# Patient Record
Sex: MALE | Race: WHITE | NOT HISPANIC OR LATINO | Employment: FULL TIME | URBAN - METROPOLITAN AREA
[De-identification: names, ages, dates, MRNs, and addresses within clinical notes are randomized per-mention and may not be internally consistent; named-entity substitution may affect disease eponyms.]

---

## 2018-01-23 ENCOUNTER — TRANSCRIBE ORDERS (OUTPATIENT)
Dept: ADMINISTRATIVE | Age: 48
End: 2018-01-23

## 2018-01-23 ENCOUNTER — APPOINTMENT (OUTPATIENT)
Dept: LAB | Age: 48
End: 2018-01-23
Attending: PREVENTIVE MEDICINE

## 2018-01-23 DIAGNOSIS — Z02.1 PRE-EMPLOYMENT HEALTH SCREENING EXAMINATION: Primary | ICD-10-CM

## 2018-01-23 DIAGNOSIS — Z02.1 PRE-EMPLOYMENT HEALTH SCREENING EXAMINATION: ICD-10-CM

## 2018-01-23 LAB — RUBV IGG SERPL IA-ACNC: >175 IU/ML

## 2018-01-23 PROCEDURE — 86480 TB TEST CELL IMMUN MEASURE: CPT

## 2018-01-23 PROCEDURE — 86787 VARICELLA-ZOSTER ANTIBODY: CPT

## 2018-01-23 PROCEDURE — 86765 RUBEOLA ANTIBODY: CPT

## 2018-01-23 PROCEDURE — 86762 RUBELLA ANTIBODY: CPT

## 2018-01-23 PROCEDURE — 86735 MUMPS ANTIBODY: CPT

## 2018-01-23 PROCEDURE — 36415 COLL VENOUS BLD VENIPUNCTURE: CPT

## 2018-01-25 LAB
ANNOTATION COMMENT IMP: NORMAL
GAMMA INTERFERON BACKGROUND BLD IA-ACNC: 0.01 IU/ML
M TB IFN-G BLD-IMP: NEGATIVE
M TB IFN-G CD4+ BCKGRND COR BLD-ACNC: 0 IU/ML
M TB IFN-G CD4+ T-CELLS BLD-ACNC: 0.01 IU/ML
MEV IGG SER QL: ABNORMAL
MITOGEN IGNF BLD-ACNC: 6.81 IU/ML
MUV IGG SER QL: NORMAL
QUANTIFERON-TB GOLD IN TUBE: NORMAL
SERVICE CMNT-IMP: NORMAL
VZV IGG SER IA-ACNC: NORMAL

## 2018-04-30 RX ORDER — DILTIAZEM HYDROCHLORIDE 180 MG/1
CAPSULE, COATED, EXTENDED RELEASE ORAL
COMMUNITY
Start: 2010-01-01 | End: 2018-12-12 | Stop reason: SDUPTHER

## 2018-04-30 RX ORDER — LISINOPRIL 10 MG/1
TABLET ORAL
COMMUNITY
Start: 2010-01-01 | End: 2018-12-12 | Stop reason: SDUPTHER

## 2018-05-01 ENCOUNTER — OFFICE VISIT (OUTPATIENT)
Dept: INTERNAL MEDICINE CLINIC | Facility: CLINIC | Age: 48
End: 2018-05-01
Payer: COMMERCIAL

## 2018-05-01 VITALS
HEIGHT: 69 IN | HEART RATE: 76 BPM | WEIGHT: 120 LBS | DIASTOLIC BLOOD PRESSURE: 70 MMHG | SYSTOLIC BLOOD PRESSURE: 112 MMHG | TEMPERATURE: 97.8 F | OXYGEN SATURATION: 97 % | BODY MASS INDEX: 17.77 KG/M2

## 2018-05-01 VITALS
WEIGHT: 264.8 LBS | HEIGHT: 69 IN | BODY MASS INDEX: 39.22 KG/M2 | DIASTOLIC BLOOD PRESSURE: 70 MMHG | HEART RATE: 76 BPM | OXYGEN SATURATION: 97 % | TEMPERATURE: 97.8 F | SYSTOLIC BLOOD PRESSURE: 112 MMHG

## 2018-05-01 DIAGNOSIS — G47.33 OBSTRUCTIVE SLEEP APNEA: ICD-10-CM

## 2018-05-01 DIAGNOSIS — J30.1 SEASONAL ALLERGIC RHINITIS DUE TO POLLEN: Primary | ICD-10-CM

## 2018-05-01 DIAGNOSIS — I10 ESSENTIAL HYPERTENSION: ICD-10-CM

## 2018-05-01 DIAGNOSIS — Z00.00 ANNUAL PHYSICAL EXAM: Primary | ICD-10-CM

## 2018-05-01 DIAGNOSIS — I47.1 PAROXYSMAL SVT (SUPRAVENTRICULAR TACHYCARDIA) (HCC): ICD-10-CM

## 2018-05-01 DIAGNOSIS — E66.9 NON MORBID OBESITY, UNSPECIFIED OBESITY TYPE: ICD-10-CM

## 2018-05-01 PROCEDURE — 3074F SYST BP LT 130 MM HG: CPT | Performed by: INTERNAL MEDICINE

## 2018-05-01 PROCEDURE — 99396 PREV VISIT EST AGE 40-64: CPT | Performed by: INTERNAL MEDICINE

## 2018-05-01 PROCEDURE — 3078F DIAST BP <80 MM HG: CPT | Performed by: INTERNAL MEDICINE

## 2018-05-01 PROCEDURE — 99203 OFFICE O/P NEW LOW 30 MIN: CPT | Performed by: INTERNAL MEDICINE

## 2018-05-01 RX ORDER — FLUTICASONE PROPIONATE 50 MCG
2 SPRAY, SUSPENSION (ML) NASAL
COMMUNITY
Start: 2018-01-25 | End: 2022-04-07

## 2018-05-01 NOTE — PROGRESS NOTES
Assessment/Plan:    Allergic rhinitis    Stable  Continue with Flonase  Obstructive sleep apnea   Well controlled, continue with nightly CPAP  Essential hypertension    Controlled  Continue with lisinopril 10 mg daily  Paroxysmal SVT (supraventricular tachycardia) (HCC)    Stable, currently in regular rate and rhythm  Continue with Cardizem 180 mg daily  Non morbid obesity, unspecified obesity type  He has been doing well with exercise regimen, every other day  Encourage he continue lifestyle modifications  Will check  CBC, CMP, TSH, lipid panel, hemoglobin A1c  Diagnoses and all orders for this visit:    Seasonal allergic rhinitis due to pollen    Obstructive sleep apnea    Essential hypertension  -     Lipid panel; Future  -     CBC; Future  -     Comprehensive metabolic panel; Future  -     TSH, 3rd generation with T4 reflex; Future    Paroxysmal SVT (supraventricular tachycardia) (HCC)  -     TSH, 3rd generation with T4 reflex; Future    Non morbid obesity, unspecified obesity type  -     Lipid panel; Future  -     CBC; Future  -     Comprehensive metabolic panel; Future  -     HEMOGLOBIN A1C W/ EAG ESTIMATION; Future  -     TSH, 3rd generation with T4 reflex; Future    Other orders  -     diltiazem (CARDIZEM CD) 180 mg 24 hr capsule;   -     lisinopril (ZESTRIL) 10 mg tablet;   -     fluticasone (FLONASE) 50 mcg/act nasal spray; 2 sprays into each nostril daily at bedtime as needed      Time spent during encounter: 30 minutes  Subjective:      Patient ID: Vick Preston is a 49 y o  male  34-year-old male is seen today to establish care  He has a past medical history of hypertension, nephrolithiasis, paroxysmal SVT, and allergic rhinitis  He has been compliant with his current medication regimen which consist of Cardizem 180 mg daily, Flonase, and lisinopril 10 mg daily  No active issues or complaints at this time    He does have a [employer] physical exam form to be completed today   He has obstructive sleep apnea to which he uses his CPAP nightly  Hypertension   This is a chronic problem  The current episode started more than 1 year ago  The problem is unchanged  The problem is controlled  Pertinent negatives include no chest pain, palpitations or shortness of breath  Risk factors for coronary artery disease include obesity and male gender  Past treatments include ACE inhibitors  The current treatment provides mild improvement  There are no compliance problems  The following portions of the patient's history were reviewed and updated as appropriate: allergies, current medications, past family history, past medical history, past social history, past surgical history and problem list     Review of Systems   Constitutional: Negative  Negative for chills, fatigue and fever  HENT: Negative for congestion, ear pain, postnasal drip, rhinorrhea and sore throat  Eyes: Negative  Respiratory: Negative for cough, chest tightness, shortness of breath and wheezing  Cardiovascular: Negative for chest pain and palpitations  Gastrointestinal: Negative for abdominal distention, abdominal pain, blood in stool, constipation, diarrhea and nausea  Endocrine: Negative  Genitourinary: Negative for difficulty urinating, dysuria and hematuria  Musculoskeletal: Negative  Skin: Negative  Allergic/Immunologic: Negative for environmental allergies and food allergies  Neurological: Negative  Hematological: Negative for adenopathy  Psychiatric/Behavioral: Negative for agitation, behavioral problems, confusion and sleep disturbance           Past Medical History:   Diagnosis Date    Hypertension     Irregular heart beat     Kidney stone     Sleep apnea          Current Outpatient Prescriptions:     diltiazem (CARDIZEM CD) 180 mg 24 hr capsule, , Disp: , Rfl:     fluticasone (FLONASE) 50 mcg/act nasal spray, 2 sprays into each nostril daily at bedtime as needed, Disp: , Rfl:     lisinopril (ZESTRIL) 10 mg tablet, , Disp: , Rfl:     No Known Allergies    Social History   History reviewed  No pertinent surgical history  Family History   Problem Relation Age of Onset    Hyperlipidemia Mother     Heart block Mother     Kidney nephrosis Father     Stroke Maternal Grandmother     No Known Problems Maternal Grandfather     Cancer Paternal Grandmother     Heart disease Paternal Grandfather        Objective:  /70 (BP Location: Left arm, Patient Position: Sitting, Cuff Size: Large)   Pulse 76   Temp 97 8 °F (36 6 °C) (Oral)   Ht 5' 8 58" (1 742 m)   Wt 120 kg (264 lb 12 8 oz)   SpO2 97%   BMI 39 58 kg/m²     No results found for this or any previous visit (from the past 1344 hour(s))  Physical Exam   Constitutional: He is oriented to person, place, and time  He appears well-developed and well-nourished  No distress  HENT:   Head: Normocephalic and atraumatic  Eyes: Conjunctivae and EOM are normal  Pupils are equal, round, and reactive to light  Right eye exhibits no discharge  Left eye exhibits no discharge  No scleral icterus  Neck: Normal range of motion  Neck supple  No JVD present  No thyromegaly present  Cardiovascular: Normal rate, regular rhythm, normal heart sounds and intact distal pulses  Exam reveals no gallop and no friction rub  No murmur heard  Pulmonary/Chest: Effort normal and breath sounds normal  No respiratory distress  He has no wheezes  He has no rales  He exhibits no tenderness  Abdominal: Soft  Bowel sounds are normal  He exhibits no distension and no mass  There is no tenderness  There is no rebound and no guarding  Musculoskeletal: Normal range of motion  He exhibits no edema, tenderness or deformity  Lymphadenopathy:     He has no cervical adenopathy  Neurological: He is alert and oriented to person, place, and time  He has normal reflexes  No cranial nerve deficit  Coordination normal    Skin: Skin is warm and dry  No rash noted  He is not diaphoretic  No erythema  No pallor  Psychiatric: He has a normal mood and affect  His behavior is normal  Judgment and thought content normal    Nursing note and vitals reviewed

## 2018-05-01 NOTE — ASSESSMENT & PLAN NOTE
Physical exam form for [employer] completed today  No active issues  Will follow up CBC, CMP, TSH, and lipid panel

## 2018-05-01 NOTE — ASSESSMENT & PLAN NOTE
He has been doing well with exercise regimen, every other day  Encourage he continue lifestyle modifications  Will check  CBC, CMP, TSH, lipid panel, hemoglobin A1c

## 2018-05-01 NOTE — PROGRESS NOTES
Assessment/Plan:    Annual physical exam  Physical exam form for [employer] completed today  No active issues  Will follow up CBC, CMP, TSH, and lipid panel  Diagnoses and all orders for this visit:    Annual physical exam          Subjective:      Patient ID: Gracia Rivera is a 49 y o  male  62-year-old male is seen today for annual examination  He also presents with a physical exam form to partake in [employer] activities  No active issues or concerns  No laboratory studies to review today  The following portions of the patient's history were reviewed and updated as appropriate: allergies, current medications, past family history, past medical history, past social history, past surgical history and problem list     Review of Systems   Constitutional: Negative  Negative for chills, fatigue and fever  HENT: Negative for congestion, ear pain, postnasal drip, rhinorrhea and sore throat  Eyes: Negative  Respiratory: Negative for cough, chest tightness, shortness of breath and wheezing  Cardiovascular: Negative for chest pain and palpitations  Gastrointestinal: Negative for abdominal distention, abdominal pain, blood in stool, constipation, diarrhea and nausea  Endocrine: Negative  Genitourinary: Negative for difficulty urinating, dysuria and hematuria  Musculoskeletal: Negative  Skin: Negative  Allergic/Immunologic: Negative for environmental allergies and food allergies  Neurological: Negative  Hematological: Negative for adenopathy  Psychiatric/Behavioral: Negative for agitation, behavioral problems, confusion and sleep disturbance           Past Medical History:   Diagnosis Date    Hypertension     Irregular heart beat     Kidney stone     Sleep apnea          Current Outpatient Prescriptions:     diltiazem (CARDIZEM CD) 180 mg 24 hr capsule, , Disp: , Rfl:     fluticasone (FLONASE) 50 mcg/act nasal spray, 2 sprays into each nostril daily at bedtime as needed, Disp: , Rfl:     lisinopril (ZESTRIL) 10 mg tablet, , Disp: , Rfl:     No Known Allergies    Social History   No past surgical history on file  Family History   Problem Relation Age of Onset    Hyperlipidemia Mother     Heart block Mother     Kidney nephrosis Father     Stroke Maternal Grandmother     No Known Problems Maternal Grandfather     Cancer Paternal Grandmother     Heart disease Paternal Grandfather        Objective:  /70 (BP Location: Left arm, Patient Position: Sitting, Cuff Size: Standard)   Pulse 76   Temp 97 8 °F (36 6 °C) (Oral)   Ht 5' 8 58" (1 742 m)   Wt 54 4 kg (120 lb)   SpO2 97%   BMI 17 94 kg/m²     No results found for this or any previous visit (from the past 1344 hour(s))  Physical Exam   Constitutional: He is oriented to person, place, and time  He appears well-developed and well-nourished  No distress  HENT:   Head: Normocephalic and atraumatic  Eyes: Conjunctivae and EOM are normal  Pupils are equal, round, and reactive to light  Right eye exhibits no discharge  Left eye exhibits no discharge  No scleral icterus  Neck: Normal range of motion  Neck supple  No JVD present  No thyromegaly present  Cardiovascular: Normal rate, regular rhythm, normal heart sounds and intact distal pulses  Exam reveals no gallop and no friction rub  No murmur heard  Pulmonary/Chest: Effort normal and breath sounds normal  No respiratory distress  He has no wheezes  He has no rales  He exhibits no tenderness  Abdominal: Soft  Bowel sounds are normal  He exhibits no distension and no mass  There is no tenderness  There is no rebound and no guarding  Musculoskeletal: Normal range of motion  He exhibits no edema, tenderness or deformity  Lymphadenopathy:     He has no cervical adenopathy  Neurological: He is alert and oriented to person, place, and time  He has normal reflexes  No cranial nerve deficit  Coordination normal    Skin: Skin is warm and dry   No rash noted  He is not diaphoretic  No erythema  No pallor  Psychiatric: He has a normal mood and affect  His behavior is normal  Judgment and thought content normal    Nursing note and vitals reviewed

## 2018-05-02 ENCOUNTER — PATIENT MESSAGE (OUTPATIENT)
Dept: INTERNAL MEDICINE CLINIC | Facility: CLINIC | Age: 48
End: 2018-05-02

## 2018-05-04 ENCOUNTER — LAB (OUTPATIENT)
Dept: LAB | Age: 48
End: 2018-05-04
Payer: COMMERCIAL

## 2018-05-04 DIAGNOSIS — I47.1 PAROXYSMAL SVT (SUPRAVENTRICULAR TACHYCARDIA) (HCC): ICD-10-CM

## 2018-05-04 DIAGNOSIS — I10 ESSENTIAL HYPERTENSION: ICD-10-CM

## 2018-05-04 DIAGNOSIS — E66.9 NON MORBID OBESITY, UNSPECIFIED OBESITY TYPE: ICD-10-CM

## 2018-05-04 LAB
ALBUMIN SERPL BCP-MCNC: 3.9 G/DL (ref 3.5–5)
ALP SERPL-CCNC: 75 U/L (ref 46–116)
ALT SERPL W P-5'-P-CCNC: 37 U/L (ref 12–78)
ANION GAP SERPL CALCULATED.3IONS-SCNC: 4 MMOL/L (ref 4–13)
AST SERPL W P-5'-P-CCNC: 15 U/L (ref 5–45)
BILIRUB SERPL-MCNC: 0.95 MG/DL (ref 0.2–1)
BUN SERPL-MCNC: 13 MG/DL (ref 5–25)
CALCIUM SERPL-MCNC: 9.4 MG/DL (ref 8.3–10.1)
CHLORIDE SERPL-SCNC: 104 MMOL/L (ref 100–108)
CHOLEST SERPL-MCNC: 135 MG/DL (ref 50–200)
CO2 SERPL-SCNC: 30 MMOL/L (ref 21–32)
CREAT SERPL-MCNC: 1.01 MG/DL (ref 0.6–1.3)
ERYTHROCYTE [DISTWIDTH] IN BLOOD BY AUTOMATED COUNT: 13.3 % (ref 11.6–15.1)
EST. AVERAGE GLUCOSE BLD GHB EST-MCNC: 111 MG/DL
GFR SERPL CREATININE-BSD FRML MDRD: 88 ML/MIN/1.73SQ M
GLUCOSE P FAST SERPL-MCNC: 82 MG/DL (ref 65–99)
HBA1C MFR BLD: 5.5 % (ref 4.2–6.3)
HCT VFR BLD AUTO: 43.1 % (ref 36.5–49.3)
HDLC SERPL-MCNC: 42 MG/DL (ref 40–60)
HGB BLD-MCNC: 15.2 G/DL (ref 12–17)
LDLC SERPL CALC-MCNC: 81 MG/DL (ref 0–100)
MCH RBC QN AUTO: 31.1 PG (ref 26.8–34.3)
MCHC RBC AUTO-ENTMCNC: 35.3 G/DL (ref 31.4–37.4)
MCV RBC AUTO: 88 FL (ref 82–98)
NONHDLC SERPL-MCNC: 93 MG/DL
PLATELET # BLD AUTO: 258 THOUSANDS/UL (ref 149–390)
PMV BLD AUTO: 9.9 FL (ref 8.9–12.7)
POTASSIUM SERPL-SCNC: 4.6 MMOL/L (ref 3.5–5.3)
PROT SERPL-MCNC: 7.2 G/DL (ref 6.4–8.2)
RBC # BLD AUTO: 4.89 MILLION/UL (ref 3.88–5.62)
SODIUM SERPL-SCNC: 138 MMOL/L (ref 136–145)
TRIGL SERPL-MCNC: 58 MG/DL
TSH SERPL DL<=0.05 MIU/L-ACNC: 1.67 UIU/ML (ref 0.36–3.74)
WBC # BLD AUTO: 7.77 THOUSAND/UL (ref 4.31–10.16)

## 2018-05-04 PROCEDURE — 85027 COMPLETE CBC AUTOMATED: CPT

## 2018-05-04 PROCEDURE — 36415 COLL VENOUS BLD VENIPUNCTURE: CPT

## 2018-05-04 PROCEDURE — 80053 COMPREHEN METABOLIC PANEL: CPT

## 2018-05-04 PROCEDURE — 83036 HEMOGLOBIN GLYCOSYLATED A1C: CPT

## 2018-05-04 PROCEDURE — 80061 LIPID PANEL: CPT

## 2018-05-04 PROCEDURE — 84443 ASSAY THYROID STIM HORMONE: CPT

## 2018-05-08 NOTE — PROGRESS NOTES
Patient left voicemail stating that he did receive and reviewed his blood testing results  He had no questions at this time

## 2018-10-31 ENCOUNTER — TELEPHONE (OUTPATIENT)
Dept: INTERNAL MEDICINE CLINIC | Age: 48
End: 2018-10-31

## 2018-11-14 ENCOUNTER — APPOINTMENT (OUTPATIENT)
Dept: LAB | Age: 48
End: 2018-11-14
Payer: COMMERCIAL

## 2018-11-14 ENCOUNTER — OFFICE VISIT (OUTPATIENT)
Dept: INTERNAL MEDICINE CLINIC | Facility: CLINIC | Age: 48
End: 2018-11-14
Payer: COMMERCIAL

## 2018-11-14 VITALS
HEART RATE: 93 BPM | OXYGEN SATURATION: 96 % | WEIGHT: 274.8 LBS | HEIGHT: 69 IN | SYSTOLIC BLOOD PRESSURE: 126 MMHG | BODY MASS INDEX: 40.7 KG/M2 | TEMPERATURE: 98.1 F | DIASTOLIC BLOOD PRESSURE: 78 MMHG

## 2018-11-14 DIAGNOSIS — R07.89 ATYPICAL CHEST PAIN: ICD-10-CM

## 2018-11-14 DIAGNOSIS — I47.1 PAROXYSMAL SVT (SUPRAVENTRICULAR TACHYCARDIA) (HCC): ICD-10-CM

## 2018-11-14 DIAGNOSIS — R13.10 ABNORMAL SWALLOWING: Primary | ICD-10-CM

## 2018-11-14 LAB
ANION GAP SERPL CALCULATED.3IONS-SCNC: 4 MMOL/L (ref 4–13)
BUN SERPL-MCNC: 19 MG/DL (ref 5–25)
CALCIUM SERPL-MCNC: 9.2 MG/DL (ref 8.3–10.1)
CHLORIDE SERPL-SCNC: 103 MMOL/L (ref 100–108)
CO2 SERPL-SCNC: 28 MMOL/L (ref 21–32)
CREAT SERPL-MCNC: 0.98 MG/DL (ref 0.6–1.3)
GFR SERPL CREATININE-BSD FRML MDRD: 90 ML/MIN/1.73SQ M
GLUCOSE P FAST SERPL-MCNC: 99 MG/DL (ref 65–99)
MAGNESIUM SERPL-MCNC: 2.2 MG/DL (ref 1.6–2.6)
POTASSIUM SERPL-SCNC: 4 MMOL/L (ref 3.5–5.3)
SODIUM SERPL-SCNC: 135 MMOL/L (ref 136–145)
TSH SERPL DL<=0.05 MIU/L-ACNC: 1.27 UIU/ML (ref 0.36–3.74)

## 2018-11-14 PROCEDURE — 99214 OFFICE O/P EST MOD 30 MIN: CPT | Performed by: NURSE PRACTITIONER

## 2018-11-14 PROCEDURE — 36415 COLL VENOUS BLD VENIPUNCTURE: CPT

## 2018-11-14 PROCEDURE — 83735 ASSAY OF MAGNESIUM: CPT

## 2018-11-14 PROCEDURE — 3008F BODY MASS INDEX DOCD: CPT | Performed by: NURSE PRACTITIONER

## 2018-11-14 PROCEDURE — 93000 ELECTROCARDIOGRAM COMPLETE: CPT | Performed by: NURSE PRACTITIONER

## 2018-11-14 PROCEDURE — 84443 ASSAY THYROID STIM HORMONE: CPT

## 2018-11-14 PROCEDURE — 1036F TOBACCO NON-USER: CPT | Performed by: NURSE PRACTITIONER

## 2018-11-14 PROCEDURE — 80048 BASIC METABOLIC PNL TOTAL CA: CPT

## 2018-11-14 NOTE — PROGRESS NOTES
Assessment/Plan:    Abnormal Swallowing  Audible clicking heard on exam  Will check barium swallow study  Refer to ENT - may need scope    Atypical Chest Pain/SVT  Frequent episodes of SVT - approx 4 month   EKG in office normal  Will check bmp, mg and TSH  Will check stress test  Need to obtain full records of echo from [facility]  Refer to cardiology    Pt to keep follow-up with Dr Akash Clemens next month     Diagnoses and all orders for this visit:    Abnormal swallowing  -     FL barium swallow; Future  -     Ambulatory Referral to Otolaryngology; Future    Atypical chest pain  -     POCT ECG  -     Stress test only, exercise; Future  -     Ambulatory referral to Cardiology; Future    Paroxysmal SVT (supraventricular tachycardia) (HCC)  -     POCT ECG  -     Stress test only, exercise; Future  -     Ambulatory referral to Cardiology; Future  -     Basic metabolic panel; Future  -     Magnesium; Future  -     TSH, 3rd generation with Free T4 reflex; Future        Subjective:      Patient ID: Pascual Doctor is a 51 y o  male  HPI    Pt presents complaining of difficulties with swallowing x 2 weeks  HE reports that he feels a "crunching noise" when he swallows or moves his throat  Additionally he hears an audible clicking noise  He denies a globus sensation, difficulties swallowing solids or liquids, painful swallowing, sore throat  He reports this is new and occurs with every swallow  He does have a hx of grinding teeth - denies jaw pain or pain associated with chewing  Additionally hx of YELENA - wears CPAP    He does complain of increased air in his throat/chest at times with eating  Also increased belching  Denies aggravating foods such as spicy or acidic  Denies sore taste, reflux, abd pain, nausea, vomiting  Not currently on PPI or H2  Took Rolaids last night - minimal relief  Additionally pt complains of "weird feeling" in his chest for a few weeks    He denies this as pain or tightness, but states "does not feel right "  At times he does have difficulty breathing - but unknown triggers  He reports he has had similar episodes in the past   He does have a PMH of SVT, currently on cardizem  No missed doses  However he reports approx 4 episodes of SVT a month that he is able to self break with bearing down  He has not seen cardiology in a few years  The following portions of the patient's history were reviewed and updated as appropriate: allergies, current medications, past family history, past medical history, past social history, past surgical history and problem list     Review of Systems   Constitutional: Positive for fatigue  Negative for appetite change, chills, fever and unexpected weight change  HENT: Negative for congestion, sinus pain, sinus pressure and sore throat  As noted in HPI   Respiratory: Positive for chest tightness and shortness of breath  Negative for cough and wheezing  Cardiovascular: Positive for chest pain  Negative for palpitations and leg swelling  Gastrointestinal: Negative for abdominal pain, constipation, diarrhea, nausea and vomiting  Musculoskeletal: Negative for neck pain and neck stiffness  Skin: Negative for rash  Neurological: Positive for dizziness (intermittent) and light-headedness (intermittent)  Negative for headaches  Past Medical History:   Diagnosis Date    Hypertension     Irregular heart beat     Kidney stone     Sleep apnea          Current Outpatient Prescriptions:     diltiazem (CARDIZEM CD) 180 mg 24 hr capsule, , Disp: , Rfl:     fluticasone (FLONASE) 50 mcg/act nasal spray, 2 sprays into each nostril daily at bedtime as needed, Disp: , Rfl:     lisinopril (ZESTRIL) 10 mg tablet, , Disp: , Rfl:     No Known Allergies    Social History   History reviewed  No pertinent surgical history    Family History   Problem Relation Age of Onset    Hyperlipidemia Mother     Heart block Mother     Kidney nephrosis Father     Stroke Maternal Grandmother     No Known Problems Maternal Grandfather     Cancer Paternal Grandmother     Heart disease Paternal Grandfather        Objective:  /78 (BP Location: Left arm, Patient Position: Sitting, Cuff Size: Large)   Pulse 93   Temp 98 1 °F (36 7 °C)   Ht 5' 8 58" (1 742 m)   Wt 125 kg (274 lb 12 8 oz)   SpO2 96%   BMI 41 08 kg/m²      Physical Exam   Constitutional: He is oriented to person, place, and time  He appears well-developed and well-nourished  No distress  HENT:   Head: Normocephalic and atraumatic  Right Ear: Hearing, tympanic membrane, external ear and ear canal normal    Left Ear: Hearing, tympanic membrane, external ear and ear canal normal    Nose: Nose normal  No mucosal edema or rhinorrhea  Mouth/Throat: Uvula is midline, oropharynx is clear and moist and mucous membranes are normal  No oral lesions  Normal dentition  No dental abscesses  No oropharyngeal exudate, posterior oropharyngeal edema, posterior oropharyngeal erythema or tonsillar abscesses  Neck: No tracheal deviation present  No thyromegaly present  Audible clicking with swallowing   Cardiovascular: Normal rate and regular rhythm  Pulmonary/Chest: Effort normal and breath sounds normal  No stridor  No respiratory distress  He has no wheezes  Musculoskeletal:   + crepitus to B/L TMJ - no pain associated   Lymphadenopathy:     He has no cervical adenopathy  Neurological: He is alert and oriented to person, place, and time  Skin: Skin is warm and dry  Psychiatric: He has a normal mood and affect  His behavior is normal  Judgment and thought content normal    Nursing note and vitals reviewed        EKG:  NSR

## 2018-11-14 NOTE — PATIENT INSTRUCTIONS
Will check barium swallow study  Refer to ENT    EKG in office normal  Since having frequent episodes of SVT will check bmp, mg and TSH  Will check stress test  Need to obtain full records of echo from [facility]  Refer to cardiology

## 2018-11-16 ENCOUNTER — HOSPITAL ENCOUNTER (OUTPATIENT)
Dept: RADIOLOGY | Facility: HOSPITAL | Age: 48
Discharge: HOME | End: 2018-11-16
Payer: COMMERCIAL

## 2018-11-16 DIAGNOSIS — R13.10 ABNORMAL SWALLOWING: ICD-10-CM

## 2018-11-16 PROCEDURE — 74220 X-RAY XM ESOPHAGUS 1CNTRST: CPT

## 2018-11-20 ENCOUNTER — HOSPITAL ENCOUNTER (OUTPATIENT)
Dept: NON INVASIVE DIAGNOSTICS | Facility: CLINIC | Age: 48
Discharge: HOME | End: 2018-11-20
Payer: COMMERCIAL

## 2018-11-20 DIAGNOSIS — I47.1 PAROXYSMAL SVT (SUPRAVENTRICULAR TACHYCARDIA) (HCC): ICD-10-CM

## 2018-11-20 DIAGNOSIS — R07.89 ATYPICAL CHEST PAIN: ICD-10-CM

## 2018-11-20 LAB
CHEST PAIN STATEMENT: NORMAL
MAX DIASTOLIC BP: 78 MMHG
MAX HEART RATE: 160 BPM
MAX PREDICTED HEART RATE: 171 BPM
MAX. SYSTOLIC BP: 200 MMHG
PROTOCOL NAME: NORMAL
REASON FOR TERMINATION: NORMAL
TARGET HR FORMULA: NORMAL
TEST INDICATION: NORMAL
TIME IN EXERCISE PHASE: NORMAL

## 2018-11-20 PROCEDURE — 93018 CV STRESS TEST I&R ONLY: CPT | Performed by: INTERNAL MEDICINE

## 2018-11-20 PROCEDURE — 93016 CV STRESS TEST SUPVJ ONLY: CPT | Performed by: INTERNAL MEDICINE

## 2018-11-20 PROCEDURE — 93017 CV STRESS TEST TRACING ONLY: CPT

## 2018-11-27 ENCOUNTER — TRANSCRIBE ORDERS (OUTPATIENT)
Dept: ADMINISTRATIVE | Facility: HOSPITAL | Age: 48
End: 2018-11-27

## 2018-11-27 DIAGNOSIS — R13.10 DYSPHAGIA, UNSPECIFIED TYPE: Primary | ICD-10-CM

## 2018-11-29 ENCOUNTER — HOSPITAL ENCOUNTER (OUTPATIENT)
Dept: RADIOLOGY | Age: 48
Discharge: HOME | End: 2018-11-29
Payer: COMMERCIAL

## 2018-11-29 DIAGNOSIS — R13.10 DYSPHAGIA, UNSPECIFIED TYPE: ICD-10-CM

## 2018-11-29 PROCEDURE — 70491 CT SOFT TISSUE NECK W/DYE: CPT

## 2018-11-29 RX ADMIN — IOHEXOL 85 ML: 350 INJECTION, SOLUTION INTRAVENOUS at 08:00

## 2018-12-03 ENCOUNTER — OFFICE VISIT (OUTPATIENT)
Dept: CARDIOLOGY CLINIC | Facility: CLINIC | Age: 48
End: 2018-12-03
Payer: COMMERCIAL

## 2018-12-03 VITALS
BODY MASS INDEX: 38.94 KG/M2 | DIASTOLIC BLOOD PRESSURE: 78 MMHG | SYSTOLIC BLOOD PRESSURE: 140 MMHG | HEART RATE: 65 BPM | HEIGHT: 70 IN | WEIGHT: 272 LBS

## 2018-12-03 DIAGNOSIS — I49.3 FREQUENT PVCS: Primary | ICD-10-CM

## 2018-12-03 DIAGNOSIS — I47.1 SVT (SUPRAVENTRICULAR TACHYCARDIA) (HCC): ICD-10-CM

## 2018-12-03 DIAGNOSIS — G47.33 OSA (OBSTRUCTIVE SLEEP APNEA): ICD-10-CM

## 2018-12-03 DIAGNOSIS — I10 HTN (HYPERTENSION), BENIGN: ICD-10-CM

## 2018-12-03 DIAGNOSIS — K21.00 GASTROESOPHAGEAL REFLUX DISEASE WITH ESOPHAGITIS: ICD-10-CM

## 2018-12-03 PROCEDURE — 99244 OFF/OP CNSLTJ NEW/EST MOD 40: CPT | Performed by: INTERNAL MEDICINE

## 2018-12-03 RX ORDER — LANSOPRAZOLE 30 MG/1
30 CAPSULE, DELAYED RELEASE ORAL DAILY
Qty: 90 CAPSULE | Refills: 3 | Status: SHIPPED | OUTPATIENT
Start: 2018-12-03 | End: 2019-06-04 | Stop reason: ALTCHOICE

## 2018-12-03 NOTE — PROGRESS NOTES
Outpatient Cardiology Consult Note - Pascual Doctor 51 y o  male MRN: [id number]    @ Encounter: [id number]        Patient Active Problem List    Diagnosis Date Noted    Abnormal swallowing 11/14/2018    Atypical chest pain 11/14/2018    Annual physical exam 05/01/2018    Non morbid obesity, unspecified obesity type 07/10/2017    Paroxysmal SVT (supraventricular tachycardia) ([facility] [address] ) 05/12/2015    Allergic rhinitis 07/21/2012    Essential hypertension 07/21/2012    Obstructive sleep apnea 07/21/2012       Assessment:  # PVCs- frequent on stress test with bigeminy with max stress and one ventricular couplet  PVC morphology positive in inferior leads and negative in V1, V2 but positive in V5, V6 suggest outflow etiology  # SVT- on Cardizem  mg   Stress Test 11/20/18: 7 min, 10 METs  He had throat discomfort during test and at start of test  Had more PVCs during test  Test stopped due to PVCs, couplets and ventricular bigeminy  Negative for ischemia  Echo 2015 at [facility]: EF: 65%  # YELENA- CPAP  # HTN- lisinopril 10 mg   Starting HTN on stress with SBP to 200- did not take his BP meds that morning  # multinodular thyroid  # GERD- just started Zantac    Today's Plan:  Echo to assess myocardial function  He has no hx of syncope or near syncope  Does take Monster Test which does contain different herbs and amino slim  Avoid stimulants- the amino slim has green tea extract  Recommend PPI as PVCs may be made worse by his documented reflux    HPI:     50 yo referred for SVT and frequent PVCs  Pt underwent stress test on 11/20 and had throat discomfort to start and worse with exertion and had frequent PVCs in bigeminy with test and a ventricular couplet  CT of neck showed multinodular goiter  TSH on 11/14 was normal at 1 27  Magnesium level was normal as well  Barium Swallow showed esophagitis; feels throat discomfort     10 years ago had an infection was given a lot of fluids and then told he had CHF and during that admit he needed to be cardioverted twice presumably for SVT  Has not needed to go to ER for his SVT in all this time and can bear down and break it on own  He can feel his PVCs  No hx of syncope  Father has SVT  Mother had CABG in her 66's  Lifts 3-4 days a week does some cardio with no symptoms  Supplements- whey protein, Monster Test, amino slim    Past Medical History:   Diagnosis Date    Hypertension     Irregular heart beat     Kidney stone     Sleep apnea        Review of Systems - can feel his PVCs    No Known Allergies    Current Outpatient Prescriptions:     diltiazem (CARDIZEM CD) 180 mg 24 hr capsule, , Disp: , Rfl:     fluticasone (FLONASE) 50 mcg/act nasal spray, 2 sprays into each nostril daily at bedtime as needed, Disp: , Rfl:     lisinopril (ZESTRIL) 10 mg tablet, , Disp: , Rfl:     Social History     Social History    Marital status:      Spouse name: N/A    Number of children: N/A    Years of education: N/A     Occupational History    Not on file  Social History Main Topics    Smoking status: Never Smoker    Smokeless tobacco: Never Used    Alcohol use Yes      Comment: occassional    Drug use: No    Sexual activity: Not on file     Other Topics Concern    Not on file     Social History Narrative    No narrative on file       Family History   Problem Relation Age of Onset    Hyperlipidemia Mother     Heart block Mother     Kidney nephrosis Father     Stroke Maternal Grandmother     No Known Problems Maternal Grandfather     Cancer Paternal Grandmother     Heart disease Paternal Grandfather        Physical Exam:    Vitals: Blood pressure 140/78, pulse 65, height 5' 10" (1 778 m), weight 123 kg (272 lb)  , Body mass index is 39 03 kg/m² ,   Wt Readings from Last 3 Encounters:   12/03/18 123 kg (272 lb)   11/14/18 125 kg (274 lb 12 8 oz)   05/01/18 54 4 kg (120 lb)       Physical Exam:  Vitals:    12/03/18 1349   BP: 140/78   BP Location: Right arm   Patient Position: Sitting   Cuff Size: Large   Pulse: 65   Weight: 123 kg (272 lb)   Height: 5' 10" (1 778 m)       GEN: Verena Adan appears well, alert and oriented x 3, pleasant and cooperative   HEENT: pupils equal, round, and reactive to light; extraocular muscles intact  NECK: supple, no carotid bruits   HEART: regular rhythm, normal S1 and S2, no murmurs, clicks, gallops or rubs, JVP is    LUNGS: clear to auscultation bilaterally; no wheezes, rales, or rhonchi   ABDOMEN: normal bowel sounds, soft, no tenderness, no distention  EXTREMITIES: peripheral pulses normal; no clubbing, cyanosis, or edema  NEURO: no focal findings   SKIN: normal without suspicious lesions on exposed skin    Labs & Results:    Lab Results   Component Value Date    WBC 7 77 05/04/2018    HGB 15 2 05/04/2018    HCT 43 1 05/04/2018    MCV 88 05/04/2018     05/04/2018     Lab Results   Component Value Date    CALCIUM 9 2 11/14/2018    K 4 0 11/14/2018    CO2 28 11/14/2018     11/14/2018    BUN 19 11/14/2018    CREATININE 0 98 11/14/2018     No results found for: BNP   No results found for: CHOL  Lab Results   Component Value Date    HDL 42 05/04/2018     Lab Results   Component Value Date    LDLCALC 81 05/04/2018     Lab Results   Component Value Date    TRIG 58 05/04/2018     No results found for: CHOLHDL      EKG personally reviewed by Angelina Kim DO  Counseling / Coordination of Care  Total floor / unit time spent today 40 minutes  Greater than 50% of total time was spent with the patient and / or family counseling and / or coordination of care  A description of the counseling / coordination of care: 25 min  Thank you for the opportunity to participate in the care of this patient  Angelina GUIDO    Director of Advanced Heart Failure Program  Medical Director of [address] [place] [address]

## 2018-12-12 ENCOUNTER — OFFICE VISIT (OUTPATIENT)
Dept: INTERNAL MEDICINE CLINIC | Facility: CLINIC | Age: 48
End: 2018-12-12
Payer: COMMERCIAL

## 2018-12-12 VITALS
BODY MASS INDEX: 38.97 KG/M2 | HEIGHT: 70 IN | HEART RATE: 80 BPM | OXYGEN SATURATION: 96 % | WEIGHT: 272.2 LBS | DIASTOLIC BLOOD PRESSURE: 70 MMHG | TEMPERATURE: 98.2 F | SYSTOLIC BLOOD PRESSURE: 130 MMHG

## 2018-12-12 DIAGNOSIS — G47.33 OBSTRUCTIVE SLEEP APNEA: ICD-10-CM

## 2018-12-12 DIAGNOSIS — R13.10 ABNORMAL SWALLOWING: ICD-10-CM

## 2018-12-12 DIAGNOSIS — I47.1 PAROXYSMAL SVT (SUPRAVENTRICULAR TACHYCARDIA) (HCC): ICD-10-CM

## 2018-12-12 DIAGNOSIS — E04.2 MULTINODULAR GOITER: Primary | ICD-10-CM

## 2018-12-12 DIAGNOSIS — I10 ESSENTIAL HYPERTENSION: ICD-10-CM

## 2018-12-12 PROBLEM — R07.89 ATYPICAL CHEST PAIN: Status: RESOLVED | Noted: 2018-11-14 | Resolved: 2018-12-12

## 2018-12-12 PROCEDURE — 3078F DIAST BP <80 MM HG: CPT | Performed by: INTERNAL MEDICINE

## 2018-12-12 PROCEDURE — 1036F TOBACCO NON-USER: CPT | Performed by: INTERNAL MEDICINE

## 2018-12-12 PROCEDURE — 3075F SYST BP GE 130 - 139MM HG: CPT | Performed by: INTERNAL MEDICINE

## 2018-12-12 PROCEDURE — 3008F BODY MASS INDEX DOCD: CPT | Performed by: INTERNAL MEDICINE

## 2018-12-12 PROCEDURE — 99214 OFFICE O/P EST MOD 30 MIN: CPT | Performed by: INTERNAL MEDICINE

## 2018-12-12 RX ORDER — LISINOPRIL 10 MG/1
10 TABLET ORAL DAILY
Qty: 90 TABLET | Refills: 1 | Status: SHIPPED | OUTPATIENT
Start: 2018-12-12 | End: 2019-06-04 | Stop reason: SDUPTHER

## 2018-12-12 RX ORDER — DILTIAZEM HYDROCHLORIDE 180 MG/1
180 CAPSULE, COATED, EXTENDED RELEASE ORAL DAILY
Qty: 90 CAPSULE | Refills: 0 | Status: SHIPPED | OUTPATIENT
Start: 2018-12-12 | End: 2019-03-21 | Stop reason: SDUPTHER

## 2018-12-12 NOTE — PROGRESS NOTES
Assessment/Plan:    Multinodular goiter  Will check a thyroid ultrasound  TSH remains within normal range  Asymptomatic  Abnormal swallowing  Likely due to acid reflux  Continue with Prevacid 30 mg daily  Essential hypertension  Well controlled  Continue with lisinopril 10 mg daily and Cardizem 180 mg daily  Obstructive sleep apnea  He uses CPAP nightly, his current machine is approximately 6years old  Will place an order for a new CPAP with auto titration with supplies  Diagnoses and all orders for this visit:    Multinodular goiter  -     US thyroid; Future    Abnormal swallowing  -     US thyroid; Future    Essential hypertension  -     lisinopril (ZESTRIL) 10 mg tablet; Take 1 tablet (10 mg total) by mouth daily    Paroxysmal SVT (supraventricular tachycardia) (HCC)  -     diltiazem (CARDIZEM CD) 180 mg 24 hr capsule; Take 1 capsule (180 mg total) by mouth daily    Obstructive sleep apnea  -     CPAP Auto New DME          Subjective:      Patient ID: Rodrick Hoffman is a 51 y o  male  74-year-old male is seen today for follow-up  Laboratory studies reviewed today, BMP, magnesium, and TSH were within normal range  He also had imaging studies for evaluation of a clicking sound that occurs while swallowing  CT of the neck showed a multinodular goiter with recommendations to have a sonogram performed of the thyroid  Barium swallow study showed mild distal esophagitis with GERD and a hiatal hernia  He was started on Prevacid, currently taking 30 mg daily  He has never been on any PPI/2nd generation antihistamine for acid reflux symptoms in the past   He reports improvement in his acid reflux symptoms since starting Prevacid as well as after making lifestyle modifications with healthier eating  Regarding multinodular goiter, he denies any symptoms of hypo or hyper-thyroidism  Hypertension   This is a chronic problem  The current episode started more than 1 year ago   The problem is unchanged  The problem is controlled  Pertinent negatives include no chest pain, headaches, palpitations or shortness of breath  Past treatments include ACE inhibitors and calcium channel blockers  The current treatment provides moderate improvement  There are no compliance problems  The following portions of the patient's history were reviewed and updated as appropriate: allergies, current medications, past family history, past medical history, past social history, past surgical history and problem list     Review of Systems   Constitutional: Negative for activity change, appetite change, chills, diaphoresis, fatigue and fever  HENT: Negative for congestion, postnasal drip, rhinorrhea, sinus pain, sinus pressure, sneezing and sore throat  Eyes: Negative for visual disturbance  Respiratory: Negative for apnea, cough, choking, chest tightness, shortness of breath and wheezing  Cardiovascular: Negative for chest pain, palpitations and leg swelling  Gastrointestinal: Negative for abdominal distention, abdominal pain, anal bleeding, blood in stool, constipation, diarrhea, nausea and vomiting  Endocrine: Negative for cold intolerance and heat intolerance  Genitourinary: Negative for difficulty urinating, dysuria and hematuria  Musculoskeletal: Negative  Skin: Negative  Neurological: Negative for dizziness, weakness, light-headedness, numbness and headaches  Hematological: Negative for adenopathy  Psychiatric/Behavioral: Negative for agitation, sleep disturbance and suicidal ideas  All other systems reviewed and are negative          Past Medical History:   Diagnosis Date    Hypertension     Irregular heart beat     Kidney stone     Multinodular goiter 12/12/2018    Sleep apnea          Current Outpatient Prescriptions:     diltiazem (CARDIZEM CD) 180 mg 24 hr capsule, Take 1 capsule (180 mg total) by mouth daily, Disp: 90 capsule, Rfl: 0    fluticasone (FLONASE) 50 mcg/act nasal spray, 2 sprays into each nostril daily at bedtime as needed, Disp: , Rfl:     lansoprazole (PREVACID) 30 mg capsule, Take 1 capsule (30 mg total) by mouth daily, Disp: 90 capsule, Rfl: 3    lisinopril (ZESTRIL) 10 mg tablet, Take 1 tablet (10 mg total) by mouth daily, Disp: 90 tablet, Rfl: 1    No Known Allergies    Social History   History reviewed  No pertinent surgical history  Family History   Problem Relation Age of Onset    Hyperlipidemia Mother     Heart block Mother     Kidney nephrosis Father     Stroke Maternal Grandmother     No Known Problems Maternal Grandfather     Cancer Paternal Grandmother     Heart disease Paternal Grandfather        Objective:  /70 (BP Location: Left arm, Patient Position: Sitting, Cuff Size: Adult)   Pulse 80   Temp 98 2 °F (36 8 °C) (Oral)   Ht 5' 10" (1 778 m)   Wt 123 kg (272 lb 3 2 oz)   SpO2 96%   BMI 39 06 kg/m²     Recent Results (from the past 1344 hour(s))   Basic metabolic panel    Collection Time: 11/14/18  8:20 AM   Result Value Ref Range    Sodium 135 (L) 136 - 145 mmol/L    Potassium 4 0 3 5 - 5 3 mmol/L    Chloride 103 100 - 108 mmol/L    CO2 28 21 - 32 mmol/L    ANION GAP 4 4 - 13 mmol/L    BUN 19 5 - 25 mg/dL    Creatinine 0 98 0 60 - 1 30 mg/dL    Glucose, Fasting 99 65 - 99 mg/dL    Calcium 9 2 8 3 - 10 1 mg/dL    eGFR 90 ml/min/1 73sq m   Magnesium    Collection Time: 11/14/18  8:20 AM   Result Value Ref Range    Magnesium 2 2 1 6 - 2 6 mg/dL   TSH, 3rd generation with Free T4 reflex    Collection Time: 11/14/18  8:20 AM   Result Value Ref Range    TSH 3RD GENERATON 1 270 0 358 - 3 740 uIU/mL   Stress strip    Collection Time: 11/20/18  8:02 AM   Result Value Ref Range    Protocol Name GRACIELA     Time In Exercise Phase 00:07:32     MAX   SYSTOLIC  mmHg    Max Diastolic Bp 78 mmHg    Max Heart Rate 160 BPM    Max Predicted Heart Rate 171 BPM    Reason for Termination Frequent PVCs     Test Indication chest tightness     Target Hr Formular (220 - Age)*100%     Arrhy During Ex      ECG Interp Before Ex      ECG Interp during Ex      Ex Summary Comment      Chest Pain Statement none     Overall Hr Response To Exercise      Overall BP Response To Exercise              Physical Exam   Constitutional: He is oriented to person, place, and time  He appears well-developed and well-nourished  No distress  HENT:   Head: Normocephalic and atraumatic  Eyes: Pupils are equal, round, and reactive to light  Conjunctivae and EOM are normal  Right eye exhibits no discharge  Left eye exhibits no discharge  No scleral icterus  Neck: Normal range of motion  Neck supple  No JVD present  No thyromegaly present  Cardiovascular: Normal rate, regular rhythm, normal heart sounds and intact distal pulses  Exam reveals no gallop and no friction rub  No murmur heard  Pulmonary/Chest: Effort normal and breath sounds normal  No respiratory distress  He has no wheezes  He has no rales  He exhibits no tenderness  Abdominal: Soft  Bowel sounds are normal  He exhibits no distension and no mass  There is no tenderness  There is no rebound and no guarding  Musculoskeletal: Normal range of motion  He exhibits no edema, tenderness or deformity  Lymphadenopathy:     He has no cervical adenopathy  Neurological: He is alert and oriented to person, place, and time  He has normal reflexes  No cranial nerve deficit  Coordination normal    Skin: Skin is warm and dry  No rash noted  He is not diaphoretic  No erythema  No pallor  Psychiatric: He has a normal mood and affect  His behavior is normal  Judgment and thought content normal    Nursing note and vitals reviewed

## 2018-12-12 NOTE — ASSESSMENT & PLAN NOTE
He uses CPAP nightly, his current machine is approximately 6years old  Will place an order for a new CPAP with auto titration with supplies

## 2018-12-15 ENCOUNTER — HOSPITAL ENCOUNTER (OUTPATIENT)
Dept: ULTRASOUND IMAGING | Facility: HOSPITAL | Age: 48
Discharge: HOME | End: 2018-12-15
Attending: INTERNAL MEDICINE
Payer: COMMERCIAL

## 2018-12-15 DIAGNOSIS — R13.10 ABNORMAL SWALLOWING: ICD-10-CM

## 2018-12-15 DIAGNOSIS — E04.2 MULTINODULAR GOITER: ICD-10-CM

## 2018-12-15 PROCEDURE — 76536 US EXAM OF HEAD AND NECK: CPT

## 2018-12-19 ENCOUNTER — TELEPHONE (OUTPATIENT)
Dept: INTERNAL MEDICINE CLINIC | Facility: CLINIC | Age: 48
End: 2018-12-19

## 2018-12-19 DIAGNOSIS — E04.2 MULTINODULAR GOITER (NONTOXIC): Primary | ICD-10-CM

## 2018-12-19 NOTE — TELEPHONE ENCOUNTER
Giulia Campos from procedure scheduling called in regards to the patients order for a fine needle biopsy  She stated that they found nodules on both the left and the right side of the patients neck  They need the order to be changed so that it is for a "US thyroid biopsy" and they also need to know If it needs to be with or without Afirma  If someone could please look into this, and give Giulia Campos a call back  Her phone number is [phone]  Thank you!

## 2018-12-19 NOTE — TELEPHONE ENCOUNTER
Contacted patient regarding thyroid ultrasound which shows multi nodule goiter  Plan will be to follow up with an FNA of the nodules  He was understanding plan and will contact [facility] to schedule ultrasound-guided FNA of the thyroid nodules

## 2018-12-20 ENCOUNTER — TELEPHONE (OUTPATIENT)
Dept: INTERNAL MEDICINE CLINIC | Age: 48
End: 2018-12-20

## 2018-12-20 ENCOUNTER — TELEPHONE (OUTPATIENT)
Dept: INTERNAL MEDICINE CLINIC | Facility: CLINIC | Age: 48
End: 2018-12-20

## 2018-12-20 DIAGNOSIS — E04.2 MULTIPLE THYROID NODULES: Primary | ICD-10-CM

## 2018-12-20 NOTE — TELEPHONE ENCOUNTER
Mike Bartlett called and requested specifics for patients aspiration test   She stated she needed to know how many nodules would need to be biopsied and provider would need to specify which side

## 2018-12-20 NOTE — TELEPHONE ENCOUNTER
Spoke with Radiology Department regarding which nodules to biopsy  Requesting nodules numbers 2 and 4 to be biopsied, given ultrasound findings:     Nodule #2 4 x 2 8 x 2 9 cm right mid /lower nodule  (Image number [id number]) (CRITERIA: TR 3, Mildly suspicious  FNA if >2 5 cm       Nodule #4 3 3 x 2 6 x 2 2 left mid gland nodule  (Image number [id number]) (CRITERIA: TR 4, Moderately suspicious  FNA if > 1 5 cm

## 2018-12-28 ENCOUNTER — TRANSCRIBE ORDERS (OUTPATIENT)
Dept: RADIOLOGY | Facility: HOSPITAL | Age: 48
End: 2018-12-28

## 2018-12-28 ENCOUNTER — HOSPITAL ENCOUNTER (OUTPATIENT)
Dept: RADIOLOGY | Facility: HOSPITAL | Age: 48
Discharge: HOME | End: 2018-12-28
Payer: COMMERCIAL

## 2018-12-28 DIAGNOSIS — E04.2 MULTIPLE THYROID NODULES: ICD-10-CM

## 2018-12-28 PROCEDURE — 88172 CYTP DX EVAL FNA 1ST EA SITE: CPT | Performed by: PATHOLOGY

## 2018-12-28 PROCEDURE — 88173 CYTOPATH EVAL FNA REPORT: CPT | Performed by: PATHOLOGY

## 2018-12-28 PROCEDURE — 76942 ECHO GUIDE FOR BIOPSY: CPT

## 2018-12-28 PROCEDURE — 10022 HB FNA W/IMAGE: CPT

## 2018-12-28 RX ORDER — LIDOCAINE HYDROCHLORIDE 10 MG/ML
2 INJECTION, SOLUTION INFILTRATION; PERINEURAL ONCE
Status: COMPLETED | OUTPATIENT
Start: 2018-12-28 | End: 2018-12-28

## 2018-12-28 RX ADMIN — LIDOCAINE HYDROCHLORIDE 4 ML: 10 INJECTION, SOLUTION INFILTRATION; PERINEURAL at 10:15

## 2019-01-04 ENCOUNTER — TELEPHONE (OUTPATIENT)
Dept: CARDIOLOGY CLINIC | Facility: CLINIC | Age: 49
End: 2019-01-04

## 2019-01-04 ENCOUNTER — HOSPITAL ENCOUNTER (OUTPATIENT)
Dept: NON INVASIVE DIAGNOSTICS | Facility: CLINIC | Age: 49
Discharge: HOME | End: 2019-01-04
Payer: COMMERCIAL

## 2019-01-04 DIAGNOSIS — I49.3 FREQUENT PVCS: ICD-10-CM

## 2019-01-04 DIAGNOSIS — I47.1 SVT (SUPRAVENTRICULAR TACHYCARDIA) (HCC): ICD-10-CM

## 2019-01-04 PROCEDURE — 93306 TTE W/DOPPLER COMPLETE: CPT | Performed by: INTERNAL MEDICINE

## 2019-01-04 PROCEDURE — 93306 TTE W/DOPPLER COMPLETE: CPT

## 2019-01-04 NOTE — TELEPHONE ENCOUNTER
Called patient, adv him of the echo results being normal  Patient said ok     ===View-only below this line===    ----- Message -----  From: Harry Klein DO  Sent: 1/4/2019   8:27 AM  To: Brianna Nieves MA    Echo is reviewed and is relatively normal  No significant concerning findings

## 2019-03-21 ENCOUNTER — OFFICE VISIT (OUTPATIENT)
Dept: INTERNAL MEDICINE CLINIC | Facility: CLINIC | Age: 49
End: 2019-03-21
Payer: COMMERCIAL

## 2019-03-21 VITALS
HEART RATE: 90 BPM | DIASTOLIC BLOOD PRESSURE: 82 MMHG | HEIGHT: 70 IN | WEIGHT: 275.2 LBS | BODY MASS INDEX: 39.4 KG/M2 | OXYGEN SATURATION: 96 % | TEMPERATURE: 98.9 F | SYSTOLIC BLOOD PRESSURE: 138 MMHG

## 2019-03-21 DIAGNOSIS — E04.2 MULTINODULAR GOITER: ICD-10-CM

## 2019-03-21 DIAGNOSIS — E66.9 NON MORBID OBESITY, UNSPECIFIED OBESITY TYPE: ICD-10-CM

## 2019-03-21 DIAGNOSIS — M71.21 SYNOVIAL CYST OF RIGHT POPLITEAL SPACE: ICD-10-CM

## 2019-03-21 DIAGNOSIS — G47.33 OBSTRUCTIVE SLEEP APNEA: Primary | ICD-10-CM

## 2019-03-21 DIAGNOSIS — I47.1 PAROXYSMAL SVT (SUPRAVENTRICULAR TACHYCARDIA) (HCC): ICD-10-CM

## 2019-03-21 DIAGNOSIS — I10 ESSENTIAL HYPERTENSION: ICD-10-CM

## 2019-03-21 DIAGNOSIS — J30.1 SEASONAL ALLERGIC RHINITIS DUE TO POLLEN: ICD-10-CM

## 2019-03-21 DIAGNOSIS — R13.10 ABNORMAL SWALLOWING: ICD-10-CM

## 2019-03-21 PROCEDURE — 3075F SYST BP GE 130 - 139MM HG: CPT | Performed by: INTERNAL MEDICINE

## 2019-03-21 PROCEDURE — 99214 OFFICE O/P EST MOD 30 MIN: CPT | Performed by: INTERNAL MEDICINE

## 2019-03-21 PROCEDURE — 3008F BODY MASS INDEX DOCD: CPT | Performed by: INTERNAL MEDICINE

## 2019-03-21 PROCEDURE — 3079F DIAST BP 80-89 MM HG: CPT | Performed by: INTERNAL MEDICINE

## 2019-03-21 PROCEDURE — 1036F TOBACCO NON-USER: CPT | Performed by: INTERNAL MEDICINE

## 2019-03-21 RX ORDER — DILTIAZEM HYDROCHLORIDE 180 MG/1
180 CAPSULE, COATED, EXTENDED RELEASE ORAL DAILY
Qty: 90 CAPSULE | Refills: 1 | Status: SHIPPED | OUTPATIENT
Start: 2019-03-21 | End: 2019-11-26 | Stop reason: SDUPTHER

## 2019-03-21 NOTE — PROGRESS NOTES
Assessment/Plan:    Synovial cyst of right popliteal space  Will refer to orthopedic surgery for further evaluation and treatment  Obstructive sleep apnea  Continue with nightly CPAP, currently at 10-15 cm H2O pressure  Allergic rhinitis  Continue with PRN Flonase  Essential hypertension  Continue with lisinopril 10 mg daily, well controlled  Paroxysmal SVT (supraventricular tachycardia) ([address] )  Continue with Cardizem, he has follow up with cardiology in 4/2019  Abnormal swallowing  Likely due to multinodular goiter and GERD, he has follow up with ENT scheduled  Non morbid obesity, unspecified obesity type  Discussed lifestyle modifications with diet and exercise with weight loss  Diagnoses and all orders for this visit:    Obstructive sleep apnea    Paroxysmal SVT (supraventricular tachycardia) (HCC)  -     diltiazem (CARDIZEM CD) 180 mg 24 hr capsule; Take 1 capsule (180 mg total) by mouth daily    Seasonal allergic rhinitis due to pollen    Essential hypertension  -     CBC; Future  -     Comprehensive metabolic panel; Future  -     Hemoglobin A1C; Future  -     Lipid panel; Future  -     TSH, 3rd generation with Free T4 reflex; Future    Non morbid obesity, unspecified obesity type  -     CBC; Future  -     Comprehensive metabolic panel; Future  -     Hemoglobin A1C; Future  -     Lipid panel; Future  -     TSH, 3rd generation with Free T4 reflex; Future    Synovial cyst of right popliteal space  -     Ambulatory referral to Orthopedic Surgery; Future    Abnormal swallowing    Multinodular goiter  -     CBC; Future  -     Comprehensive metabolic panel; Future  -     TSH, 3rd generation with Free T4 reflex; Future        BMI Counseling: Body mass index is 39 49 kg/m²  Discussed the patient's BMI with him  The BMI is above average  BMI counseling and education was provided to the patient   Nutrition recommendations include reducing portion sizes, decreasing overall calorie intake, 3-5 servings of fruits/vegetables daily, reducing fast food intake, consuming healthier snacks, decreasing soda and/or juice intake, moderation in carbohydrate intake, increasing intake of lean protein, reducing intake of saturated fat and trans fat and reducing intake of cholesterol  Exercise recommendations include moderate aerobic physical activity for 150 minutes/week and exercising 3-5 times per week  Subjective:      Patient ID: Pascual Doctor is a 51 y o  male  51year old male is seen today for follow up of chronic conditions  No labs to review today  He has noticed a non-tender swelling in the posterior right knee of 4 weeks duration  Denies any trauma or injury  He exercises 3-4 day a week and denies any known etiology for swelling  Hypertension   This is a chronic problem  The current episode started more than 1 year ago  The problem is unchanged  The problem is controlled  Pertinent negatives include no anxiety, blurred vision, chest pain, headaches, malaise/fatigue, neck pain, orthopnea, palpitations, peripheral edema, PND, shortness of breath or sweats  Past treatments include ACE inhibitors and calcium channel blockers  The current treatment provides moderate improvement  There are no compliance problems  Heartburn   He reports no abdominal pain, no chest pain, no choking, no coughing, no heartburn, no nausea, no sore throat or no wheezing  This is a chronic problem  The current episode started more than 1 year ago  The problem occurs rarely  The problem has been unchanged  Pertinent negatives include no fatigue  He has tried a PPI for the symptoms  The treatment provided moderate relief         The following portions of the patient's history were reviewed and updated as appropriate: allergies, current medications, past family history, past medical history, past social history, past surgical history and problem list     Review of Systems   Constitutional: Negative for activity change, appetite change, chills, diaphoresis, fatigue, fever and malaise/fatigue  HENT: Negative for congestion, postnasal drip, rhinorrhea, sinus pressure, sinus pain, sneezing and sore throat  Eyes: Negative for blurred vision and visual disturbance  Respiratory: Negative for apnea, cough, choking, chest tightness, shortness of breath and wheezing  Cardiovascular: Negative for chest pain, palpitations, orthopnea, leg swelling and PND  Gastrointestinal: Negative for abdominal distention, abdominal pain, anal bleeding, blood in stool, constipation, diarrhea, heartburn, nausea and vomiting  Endocrine: Negative for cold intolerance and heat intolerance  Genitourinary: Negative for difficulty urinating, dysuria and hematuria  Musculoskeletal: Negative  Negative for neck pain  Skin: Negative  Neurological: Negative for dizziness, weakness, light-headedness, numbness and headaches  Hematological: Negative for adenopathy  Psychiatric/Behavioral: Negative for agitation, sleep disturbance and suicidal ideas  All other systems reviewed and are negative          Past Medical History:   Diagnosis Date    Hypertension     Irregular heart beat     Kidney stone     Multinodular goiter 12/12/2018    Sleep apnea          Current Outpatient Medications:     diltiazem (CARDIZEM CD) 180 mg 24 hr capsule, Take 1 capsule (180 mg total) by mouth daily, Disp: 90 capsule, Rfl: 1    fluticasone (FLONASE) 50 mcg/act nasal spray, 2 sprays into each nostril daily at bedtime as needed, Disp: , Rfl:     lansoprazole (PREVACID) 30 mg capsule, Take 1 capsule (30 mg total) by mouth daily, Disp: 90 capsule, Rfl: 3    lisinopril (ZESTRIL) 10 mg tablet, Take 1 tablet (10 mg total) by mouth daily, Disp: 90 tablet, Rfl: 1    No Known Allergies    Social History   Past Surgical History:   Procedure Laterality Date    US GUIDED THYROID BIOPSY  12/28/2018     Family History   Problem Relation Age of Onset    Hyperlipidemia Mother     Heart block Mother     Kidney nephrosis Father     Stroke Maternal Grandmother     No Known Problems Maternal Grandfather     Cancer Paternal Grandmother     Heart disease Paternal Grandfather        Objective:  /82 (BP Location: Left arm, Patient Position: Sitting, Cuff Size: Large)   Pulse 90   Temp 98 9 °F (37 2 °C) (Oral)   Ht 5' 10" (1 778 m)   Wt 125 kg (275 lb 3 2 oz)   SpO2 96%   BMI 39 49 kg/m²     No results found for this or any previous visit (from the past 1344 hour(s))  Physical Exam   Constitutional: He is oriented to person, place, and time  He appears well-developed and well-nourished  No distress  HENT:   Head: Normocephalic and atraumatic  Eyes: Pupils are equal, round, and reactive to light  Conjunctivae and EOM are normal  Right eye exhibits no discharge  Left eye exhibits no discharge  No scleral icterus  Neck: Normal range of motion  Neck supple  No JVD present  No thyromegaly present  Cardiovascular: Normal rate, regular rhythm, normal heart sounds and intact distal pulses  Exam reveals no gallop and no friction rub  No murmur heard  Pulmonary/Chest: Effort normal and breath sounds normal  No respiratory distress  He has no wheezes  He has no rales  He exhibits no tenderness  Abdominal: Soft  Bowel sounds are normal  He exhibits no distension and no mass  There is no tenderness  There is no rebound and no guarding  Musculoskeletal: Normal range of motion  He exhibits no edema, tenderness or deformity  Baker's cyst of the posterior right knee  Non tender, no increased warmth on palpation  Lymphadenopathy:     He has no cervical adenopathy  Neurological: He is alert and oriented to person, place, and time  He has normal reflexes  No cranial nerve deficit  Coordination normal    Skin: Skin is warm and dry  No rash noted  He is not diaphoretic  No erythema  No pallor  Psychiatric: He has a normal mood and affect   His behavior is normal  Judgment and thought content normal    Nursing note and vitals reviewed

## 2019-04-17 ENCOUNTER — HOSPITAL ENCOUNTER (OUTPATIENT)
Dept: RADIOLOGY | Facility: HOSPITAL | Age: 49
Discharge: HOME | End: 2019-04-17
Attending: ORTHOPAEDIC SURGERY
Payer: COMMERCIAL

## 2019-04-17 ENCOUNTER — OFFICE VISIT (OUTPATIENT)
Dept: OBGYN CLINIC | Facility: HOSPITAL | Age: 49
End: 2019-04-17
Attending: INTERNAL MEDICINE
Payer: COMMERCIAL

## 2019-04-17 VITALS
BODY MASS INDEX: 39.49 KG/M2 | HEIGHT: 70 IN | DIASTOLIC BLOOD PRESSURE: 79 MMHG | SYSTOLIC BLOOD PRESSURE: 127 MMHG | HEART RATE: 89 BPM

## 2019-04-17 DIAGNOSIS — M25.561 RIGHT KNEE PAIN, UNSPECIFIED CHRONICITY: ICD-10-CM

## 2019-04-17 DIAGNOSIS — M25.561 RIGHT KNEE PAIN, UNSPECIFIED CHRONICITY: Primary | ICD-10-CM

## 2019-04-17 DIAGNOSIS — M17.11 PRIMARY OSTEOARTHRITIS OF RIGHT KNEE: ICD-10-CM

## 2019-04-17 DIAGNOSIS — M71.21 SYNOVIAL CYST OF RIGHT POPLITEAL SPACE: ICD-10-CM

## 2019-04-17 PROCEDURE — 73562 X-RAY EXAM OF KNEE 3: CPT

## 2019-04-17 PROCEDURE — 99203 OFFICE O/P NEW LOW 30 MIN: CPT | Performed by: ORTHOPAEDIC SURGERY

## 2019-04-23 ENCOUNTER — TRANSCRIBE ORDERS (OUTPATIENT)
Dept: RADIOLOGY | Facility: HOSPITAL | Age: 49
End: 2019-04-23

## 2019-04-23 ENCOUNTER — HOSPITAL ENCOUNTER (OUTPATIENT)
Dept: RADIOLOGY | Facility: HOSPITAL | Age: 49
Discharge: HOME | End: 2019-04-23
Attending: ORTHOPAEDIC SURGERY
Payer: COMMERCIAL

## 2019-04-23 DIAGNOSIS — M71.21 SYNOVIAL CYST OF RIGHT POPLITEAL SPACE: ICD-10-CM

## 2019-04-23 PROBLEM — K21.9 LARYNGOPHARYNGEAL REFLUX (LPR): Status: ACTIVE | Noted: 2019-04-23

## 2019-04-23 PROCEDURE — 20611 DRAIN/INJ JOINT/BURSA W/US: CPT

## 2019-04-23 RX ORDER — LIDOCAINE HYDROCHLORIDE 10 MG/ML
5 INJECTION, SOLUTION EPIDURAL; INFILTRATION; INTRACAUDAL; PERINEURAL ONCE
Status: COMPLETED | OUTPATIENT
Start: 2019-04-23 | End: 2019-04-23

## 2019-04-23 RX ADMIN — LIDOCAINE HYDROCHLORIDE 2 ML: 10 INJECTION, SOLUTION EPIDURAL; INFILTRATION; INTRACAUDAL; PERINEURAL at 15:22

## 2019-04-25 ENCOUNTER — OFFICE VISIT (OUTPATIENT)
Dept: CARDIOLOGY CLINIC | Facility: CLINIC | Age: 49
End: 2019-04-25
Payer: COMMERCIAL

## 2019-04-25 VITALS
SYSTOLIC BLOOD PRESSURE: 130 MMHG | WEIGHT: 270.2 LBS | BODY MASS INDEX: 38.68 KG/M2 | HEART RATE: 80 BPM | DIASTOLIC BLOOD PRESSURE: 80 MMHG | HEIGHT: 70 IN

## 2019-04-25 DIAGNOSIS — I49.3 FREQUENT PVCS: ICD-10-CM

## 2019-04-25 DIAGNOSIS — I47.1 SVT (SUPRAVENTRICULAR TACHYCARDIA) (HCC): ICD-10-CM

## 2019-04-25 DIAGNOSIS — I10 HTN (HYPERTENSION), BENIGN: Primary | ICD-10-CM

## 2019-04-25 DIAGNOSIS — Z99.89 OSA ON CPAP: ICD-10-CM

## 2019-04-25 DIAGNOSIS — G47.33 OSA ON CPAP: ICD-10-CM

## 2019-04-25 PROCEDURE — 99214 OFFICE O/P EST MOD 30 MIN: CPT | Performed by: INTERNAL MEDICINE

## 2019-06-04 ENCOUNTER — OFFICE VISIT (OUTPATIENT)
Dept: INTERNAL MEDICINE CLINIC | Facility: CLINIC | Age: 49
End: 2019-06-04
Payer: COMMERCIAL

## 2019-06-04 VITALS
BODY MASS INDEX: 39.17 KG/M2 | WEIGHT: 273.6 LBS | SYSTOLIC BLOOD PRESSURE: 166 MMHG | DIASTOLIC BLOOD PRESSURE: 90 MMHG | HEIGHT: 70 IN | HEART RATE: 80 BPM | OXYGEN SATURATION: 98 % | TEMPERATURE: 97.9 F

## 2019-06-04 DIAGNOSIS — I10 ESSENTIAL HYPERTENSION: ICD-10-CM

## 2019-06-04 DIAGNOSIS — Z12.11 SCREEN FOR COLON CANCER: Primary | ICD-10-CM

## 2019-06-04 DIAGNOSIS — B02.9 HERPES ZOSTER WITHOUT COMPLICATION: ICD-10-CM

## 2019-06-04 PROCEDURE — 1036F TOBACCO NON-USER: CPT | Performed by: INTERNAL MEDICINE

## 2019-06-04 PROCEDURE — 99213 OFFICE O/P EST LOW 20 MIN: CPT | Performed by: INTERNAL MEDICINE

## 2019-06-04 PROCEDURE — 3008F BODY MASS INDEX DOCD: CPT | Performed by: INTERNAL MEDICINE

## 2019-06-04 RX ORDER — VALACYCLOVIR HYDROCHLORIDE 1 G/1
1000 TABLET, FILM COATED ORAL EVERY 8 HOURS
Qty: 21 TABLET | Refills: 0 | Status: SHIPPED | OUTPATIENT
Start: 2019-06-04 | End: 2019-09-23 | Stop reason: ALTCHOICE

## 2019-06-04 RX ORDER — LISINOPRIL 10 MG/1
10 TABLET ORAL DAILY
Qty: 90 TABLET | Refills: 1 | Status: SHIPPED | OUTPATIENT
Start: 2019-06-04 | End: 2019-11-26 | Stop reason: SDUPTHER

## 2019-09-10 ENCOUNTER — TRANSCRIBE ORDERS (OUTPATIENT)
Dept: ADMINISTRATIVE | Age: 49
End: 2019-09-10

## 2019-09-10 ENCOUNTER — APPOINTMENT (OUTPATIENT)
Dept: LAB | Age: 49
End: 2019-09-10
Payer: COMMERCIAL

## 2019-09-10 DIAGNOSIS — E66.9 NON MORBID OBESITY, UNSPECIFIED OBESITY TYPE: ICD-10-CM

## 2019-09-10 DIAGNOSIS — E04.2 MULTINODULAR GOITER: ICD-10-CM

## 2019-09-10 DIAGNOSIS — I10 ESSENTIAL HYPERTENSION: ICD-10-CM

## 2019-09-10 LAB
ALBUMIN SERPL BCP-MCNC: 4.3 G/DL (ref 3.5–5)
ALP SERPL-CCNC: 88 U/L (ref 46–116)
ALT SERPL W P-5'-P-CCNC: 52 U/L (ref 12–78)
ANION GAP SERPL CALCULATED.3IONS-SCNC: 9 MMOL/L (ref 4–13)
AST SERPL W P-5'-P-CCNC: 25 U/L (ref 5–45)
BILIRUB SERPL-MCNC: 0.78 MG/DL (ref 0.2–1)
BUN SERPL-MCNC: 15 MG/DL (ref 5–25)
CALCIUM SERPL-MCNC: 9.1 MG/DL (ref 8.3–10.1)
CHLORIDE SERPL-SCNC: 106 MMOL/L (ref 100–108)
CHOLEST SERPL-MCNC: 141 MG/DL (ref 50–200)
CO2 SERPL-SCNC: 26 MMOL/L (ref 21–32)
CREAT SERPL-MCNC: 0.96 MG/DL (ref 0.6–1.3)
ERYTHROCYTE [DISTWIDTH] IN BLOOD BY AUTOMATED COUNT: 13 % (ref 11.6–15.1)
EST. AVERAGE GLUCOSE BLD GHB EST-MCNC: 103 MG/DL
GFR SERPL CREATININE-BSD FRML MDRD: 92 ML/MIN/1.73SQ M
GLUCOSE P FAST SERPL-MCNC: 96 MG/DL (ref 65–99)
HBA1C MFR BLD: 5.2 % (ref 4.2–6.3)
HCT VFR BLD AUTO: 45.4 % (ref 36.5–49.3)
HDLC SERPL-MCNC: 41 MG/DL (ref 40–60)
HGB BLD-MCNC: 15.3 G/DL (ref 12–17)
LDLC SERPL CALC-MCNC: 90 MG/DL (ref 0–100)
MCH RBC QN AUTO: 30.2 PG (ref 26.8–34.3)
MCHC RBC AUTO-ENTMCNC: 33.7 G/DL (ref 31.4–37.4)
MCV RBC AUTO: 90 FL (ref 82–98)
NONHDLC SERPL-MCNC: 100 MG/DL
PLATELET # BLD AUTO: 274 THOUSANDS/UL (ref 149–390)
PMV BLD AUTO: 9.3 FL (ref 8.9–12.7)
POTASSIUM SERPL-SCNC: 3.8 MMOL/L (ref 3.5–5.3)
PROT SERPL-MCNC: 7.4 G/DL (ref 6.4–8.2)
RBC # BLD AUTO: 5.07 MILLION/UL (ref 3.88–5.62)
SODIUM SERPL-SCNC: 141 MMOL/L (ref 136–145)
TRIGL SERPL-MCNC: 48 MG/DL
TSH SERPL DL<=0.05 MIU/L-ACNC: 1.37 UIU/ML (ref 0.36–3.74)
WBC # BLD AUTO: 7.17 THOUSAND/UL (ref 4.31–10.16)

## 2019-09-10 PROCEDURE — 80053 COMPREHEN METABOLIC PANEL: CPT

## 2019-09-10 PROCEDURE — 84443 ASSAY THYROID STIM HORMONE: CPT

## 2019-09-10 PROCEDURE — 85027 COMPLETE CBC AUTOMATED: CPT

## 2019-09-10 PROCEDURE — 80061 LIPID PANEL: CPT

## 2019-09-10 PROCEDURE — 83036 HEMOGLOBIN GLYCOSYLATED A1C: CPT

## 2019-09-10 PROCEDURE — 36415 COLL VENOUS BLD VENIPUNCTURE: CPT

## 2019-09-23 ENCOUNTER — OFFICE VISIT (OUTPATIENT)
Dept: INTERNAL MEDICINE CLINIC | Facility: CLINIC | Age: 49
End: 2019-09-23
Payer: COMMERCIAL

## 2019-09-23 VITALS
DIASTOLIC BLOOD PRESSURE: 90 MMHG | TEMPERATURE: 97.3 F | BODY MASS INDEX: 39.65 KG/M2 | HEART RATE: 86 BPM | WEIGHT: 277 LBS | HEIGHT: 70 IN | SYSTOLIC BLOOD PRESSURE: 140 MMHG | OXYGEN SATURATION: 97 %

## 2019-09-23 DIAGNOSIS — I10 ESSENTIAL HYPERTENSION: ICD-10-CM

## 2019-09-23 DIAGNOSIS — E66.9 NON MORBID OBESITY, UNSPECIFIED OBESITY TYPE: ICD-10-CM

## 2019-09-23 DIAGNOSIS — I47.1 PAROXYSMAL SVT (SUPRAVENTRICULAR TACHYCARDIA) (HCC): ICD-10-CM

## 2019-09-23 DIAGNOSIS — K21.9 LARYNGOPHARYNGEAL REFLUX (LPR): Primary | ICD-10-CM

## 2019-09-23 DIAGNOSIS — J30.1 SEASONAL ALLERGIC RHINITIS DUE TO POLLEN: ICD-10-CM

## 2019-09-23 DIAGNOSIS — M71.21 SYNOVIAL CYST OF RIGHT POPLITEAL SPACE: ICD-10-CM

## 2019-09-23 PROBLEM — B02.9 HERPES ZOSTER WITHOUT COMPLICATION: Status: RESOLVED | Noted: 2019-06-04 | Resolved: 2019-09-23

## 2019-09-23 PROBLEM — R13.10 ABNORMAL SWALLOWING: Status: RESOLVED | Noted: 2018-11-14 | Resolved: 2019-09-23

## 2019-09-23 PROCEDURE — 3008F BODY MASS INDEX DOCD: CPT | Performed by: INTERNAL MEDICINE

## 2019-09-23 PROCEDURE — 99214 OFFICE O/P EST MOD 30 MIN: CPT | Performed by: INTERNAL MEDICINE

## 2019-09-23 RX ORDER — LANSOPRAZOLE 30 MG/1
1 CAPSULE, DELAYED RELEASE ORAL AS NEEDED
COMMUNITY
Start: 2019-08-07 | End: 2019-11-26 | Stop reason: SDUPTHER

## 2019-09-23 NOTE — PROGRESS NOTES
Assessment/Plan:    Laryngopharyngeal reflux (LPR)  Continue with as needed Prevacid  Allergic rhinitis  Continue with as needed Flonase  Essential hypertension  Controlled  Continue with lisinopril 10 mg daily  Paroxysmal SVT (supraventricular tachycardia) ([address] )  Continue follow-up with Cardiology  Continue with diltiazem 180 mg daily  Synovial cyst of right popliteal space  Recurrence of synovial cyst, recommend he follow up with Orthopedic surgery  Diagnoses and all orders for this visit:    Laryngopharyngeal reflux (LPR)    Seasonal allergic rhinitis due to pollen    Essential hypertension    Paroxysmal SVT (supraventricular tachycardia) (HCC)    Synovial cyst of right popliteal space    Non morbid obesity, unspecified obesity type    Other orders  -     lansoprazole (PREVACID) 30 mg capsule; Take 1 capsule by mouth as needed          BMI Counseling: Body mass index is 39 75 kg/m²  Discussed the patient's BMI with him  The BMI is above normal  Nutrition recommendations include reducing portion sizes, decreasing overall calorie intake, 3-5 servings of fruits/vegetables daily, reducing fast food intake, consuming healthier snacks, decreasing soda and/or juice intake, moderation in carbohydrate intake, increasing intake of lean protein, reducing intake of saturated fat and trans fat and reducing intake of cholesterol  Exercise recommendations include moderate aerobic physical activity for 150 minutes/week and exercising 3-5 times per week  Subjective:      Patient ID: Chrissy Valladares is a 47 y o  male  58-year-old male is seen today for follow-up of chronic conditions  Laboratory studies reviewed today, CBC, CMP, A1c, and TSH are within acceptable range  He is currently experiencing recurrence of right Baker cyst   He had it drained in 04/2019 with approximately 50 mL aspirated  Hypertension   This is a chronic problem  The current episode started more than 1 year ago   The problem is unchanged  The problem is controlled  Pertinent negatives include no anxiety, blurred vision, chest pain, headaches, malaise/fatigue, neck pain, orthopnea, palpitations, peripheral edema, PND, shortness of breath or sweats  Past treatments include ACE inhibitors and calcium channel blockers  The current treatment provides moderate improvement  There are no compliance problems  Heartburn   He complains of heartburn  He reports no abdominal pain, no chest pain, no choking, no coughing, no nausea, no sore throat or no wheezing  This is a recurrent problem  The current episode started more than 1 year ago  The problem occurs occasionally  The symptoms are aggravated by certain foods  Pertinent negatives include no fatigue  He has tried a PPI for the symptoms  The treatment provided moderate relief  The following portions of the patient's history were reviewed and updated as appropriate: allergies, current medications, past family history, past medical history, past social history, past surgical history and problem list     Review of Systems   Constitutional: Negative for activity change, appetite change, chills, diaphoresis, fatigue, fever and malaise/fatigue  HENT: Negative for congestion, postnasal drip, rhinorrhea, sinus pressure, sinus pain, sneezing and sore throat  Eyes: Negative for blurred vision and visual disturbance  Respiratory: Negative for apnea, cough, choking, chest tightness, shortness of breath and wheezing  Cardiovascular: Negative for chest pain, palpitations, orthopnea, leg swelling and PND  Gastrointestinal: Positive for heartburn  Negative for abdominal distention, abdominal pain, anal bleeding, blood in stool, constipation, diarrhea, nausea and vomiting  Endocrine: Negative for cold intolerance and heat intolerance  Genitourinary: Negative for difficulty urinating, dysuria and hematuria  Musculoskeletal: Negative  Negative for neck pain  Skin: Negative  Neurological: Negative for dizziness, weakness, light-headedness, numbness and headaches  Hematological: Negative for adenopathy  Psychiatric/Behavioral: Negative for agitation, sleep disturbance and suicidal ideas  All other systems reviewed and are negative          Past Medical History:   Diagnosis Date    Herpes zoster without complication 2/0/0964    Hypertension     Irregular heart beat     Kidney stone     Multinodular goiter 12/12/2018    Sleep apnea          Current Outpatient Medications:     diltiazem (CARDIZEM CD) 180 mg 24 hr capsule, Take 1 capsule (180 mg total) by mouth daily, Disp: 90 capsule, Rfl: 1    fluticasone (FLONASE) 50 mcg/act nasal spray, 2 sprays into each nostril daily at bedtime as needed, Disp: , Rfl:     lansoprazole (PREVACID) 30 mg capsule, Take 1 capsule by mouth as needed, Disp: , Rfl:     lisinopril (ZESTRIL) 10 mg tablet, Take 1 tablet (10 mg total) by mouth daily, Disp: 90 tablet, Rfl: 1    No Known Allergies    Social History   Past Surgical History:   Procedure Laterality Date    US GUIDED MSK PROCEDURE  4/23/2019    US GUIDED THYROID BIOPSY  12/28/2018     Family History   Problem Relation Age of Onset    Hyperlipidemia Mother     Heart block Mother     Kidney nephrosis Father     Stroke Maternal Grandmother     No Known Problems Maternal Grandfather     Cancer Paternal Grandmother     Heart disease Paternal Grandfather        Objective:  /90 (BP Location: Left arm, Patient Position: Sitting, Cuff Size: Large)   Pulse 86   Temp (!) 97 3 °F (36 3 °C) (Oral)   Ht 5' 10" (1 778 m)   Wt 126 kg (277 lb)   SpO2 97%   BMI 39 75 kg/m²     Recent Results (from the past 1344 hour(s))   CBC    Collection Time: 09/10/19  7:07 AM   Result Value Ref Range    WBC 7 17 4 31 - 10 16 Thousand/uL    RBC 5 07 3 88 - 5 62 Million/uL    Hemoglobin 15 3 12 0 - 17 0 g/dL    Hematocrit 45 4 36 5 - 49 3 %    MCV 90 82 - 98 fL    MCH 30 2 26 8 - 34 3 pg    MCHC 33 7 31 4 - 37 4 g/dL    RDW 13 0 11 6 - 15 1 %    Platelets 803 535 - 875 Thousands/uL    MPV 9 3 8 9 - 12 7 fL   Comprehensive metabolic panel    Collection Time: 09/10/19  7:07 AM   Result Value Ref Range    Sodium 141 136 - 145 mmol/L    Potassium 3 8 3 5 - 5 3 mmol/L    Chloride 106 100 - 108 mmol/L    CO2 26 21 - 32 mmol/L    ANION GAP 9 4 - 13 mmol/L    BUN 15 5 - 25 mg/dL    Creatinine 0 96 0 60 - 1 30 mg/dL    Glucose, Fasting 96 65 - 99 mg/dL    Calcium 9 1 8 3 - 10 1 mg/dL    AST 25 5 - 45 U/L    ALT 52 12 - 78 U/L    Alkaline Phosphatase 88 46 - 116 U/L    Total Protein 7 4 6 4 - 8 2 g/dL    Albumin 4 3 3 5 - 5 0 g/dL    Total Bilirubin 0 78 0 20 - 1 00 mg/dL    eGFR 92 ml/min/1 73sq m   Hemoglobin A1C    Collection Time: 09/10/19  7:07 AM   Result Value Ref Range    Hemoglobin A1C 5 2 4 2 - 6 3 %     mg/dl   Lipid panel    Collection Time: 09/10/19  7:07 AM   Result Value Ref Range    Cholesterol 141 50 - 200 mg/dL    Triglycerides 48 <=150 mg/dL    HDL, Direct 41 40 - 60 mg/dL    LDL Calculated 90 0 - 100 mg/dL    Non-HDL-Chol (CHOL-HDL) 100 mg/dl   TSH, 3rd generation with Free T4 reflex    Collection Time: 09/10/19  7:07 AM   Result Value Ref Range    TSH 3RD GENERATON 1 370 0 358 - 3 740 uIU/mL            Physical Exam   Constitutional: He is oriented to person, place, and time  He appears well-developed and well-nourished  No distress  HENT:   Head: Normocephalic and atraumatic  Eyes: Pupils are equal, round, and reactive to light  Conjunctivae and EOM are normal  Right eye exhibits no discharge  Left eye exhibits no discharge  No scleral icterus  Neck: Normal range of motion  Neck supple  No JVD present  No thyromegaly present  Cardiovascular: Normal rate, regular rhythm, normal heart sounds and intact distal pulses  Exam reveals no gallop and no friction rub  No murmur heard  Pulmonary/Chest: Effort normal and breath sounds normal  No respiratory distress  He has no wheezes   He has no rales  He exhibits no tenderness  Abdominal: Soft  Bowel sounds are normal  He exhibits no distension and no mass  There is no tenderness  There is no rebound and no guarding  Musculoskeletal: Normal range of motion  He exhibits no edema, tenderness or deformity  Recurrence of baker's cyst  Non tender  Lymphadenopathy:     He has no cervical adenopathy  Neurological: He is alert and oriented to person, place, and time  He has normal reflexes  No cranial nerve deficit  Coordination normal    Skin: Skin is warm and dry  No rash noted  He is not diaphoretic  No erythema  No pallor  Psychiatric: He has a normal mood and affect  His behavior is normal  Judgment and thought content normal    Nursing note and vitals reviewed

## 2019-11-26 DIAGNOSIS — I47.1 PAROXYSMAL SVT (SUPRAVENTRICULAR TACHYCARDIA) (HCC): ICD-10-CM

## 2019-11-26 DIAGNOSIS — K21.9 LARYNGOPHARYNGEAL REFLUX (LPR): Primary | ICD-10-CM

## 2019-11-26 DIAGNOSIS — I10 ESSENTIAL HYPERTENSION: ICD-10-CM

## 2019-11-27 RX ORDER — LISINOPRIL 10 MG/1
10 TABLET ORAL DAILY
Qty: 90 TABLET | Refills: 1 | Status: SHIPPED | OUTPATIENT
Start: 2019-11-27 | End: 2020-05-22 | Stop reason: SDUPTHER

## 2019-11-27 RX ORDER — DILTIAZEM HYDROCHLORIDE 180 MG/1
180 CAPSULE, COATED, EXTENDED RELEASE ORAL DAILY
Qty: 90 CAPSULE | Refills: 1 | Status: SHIPPED | OUTPATIENT
Start: 2019-11-27 | End: 2020-05-22 | Stop reason: SDUPTHER

## 2019-11-27 RX ORDER — LANSOPRAZOLE 30 MG/1
30 CAPSULE, DELAYED RELEASE ORAL AS NEEDED
Qty: 90 CAPSULE | Refills: 1 | Status: SHIPPED | OUTPATIENT
Start: 2019-11-27 | End: 2020-09-30 | Stop reason: SDUPTHER

## 2020-03-19 ENCOUNTER — OFFICE VISIT (OUTPATIENT)
Dept: INTERNAL MEDICINE CLINIC | Facility: CLINIC | Age: 50
End: 2020-03-19
Payer: COMMERCIAL

## 2020-03-19 VITALS
HEIGHT: 70 IN | SYSTOLIC BLOOD PRESSURE: 138 MMHG | OXYGEN SATURATION: 97 % | HEART RATE: 81 BPM | DIASTOLIC BLOOD PRESSURE: 86 MMHG | WEIGHT: 275.2 LBS | TEMPERATURE: 98.2 F | BODY MASS INDEX: 39.4 KG/M2

## 2020-03-19 DIAGNOSIS — E04.2 MULTINODULAR GOITER: ICD-10-CM

## 2020-03-19 DIAGNOSIS — Z13.220 LIPID SCREENING: ICD-10-CM

## 2020-03-19 DIAGNOSIS — G44.209 TENSION HEADACHE: ICD-10-CM

## 2020-03-19 DIAGNOSIS — J30.1 SEASONAL ALLERGIC RHINITIS DUE TO POLLEN: ICD-10-CM

## 2020-03-19 DIAGNOSIS — Z13.1 SCREENING FOR DIABETES MELLITUS: ICD-10-CM

## 2020-03-19 DIAGNOSIS — M71.21 SYNOVIAL CYST OF RIGHT POPLITEAL SPACE: ICD-10-CM

## 2020-03-19 DIAGNOSIS — K21.9 LARYNGOPHARYNGEAL REFLUX (LPR): ICD-10-CM

## 2020-03-19 DIAGNOSIS — T75.3XXA MOTION SICKNESS, INITIAL ENCOUNTER: ICD-10-CM

## 2020-03-19 DIAGNOSIS — I47.1 PAROXYSMAL SVT (SUPRAVENTRICULAR TACHYCARDIA) (HCC): ICD-10-CM

## 2020-03-19 DIAGNOSIS — I10 ESSENTIAL HYPERTENSION: ICD-10-CM

## 2020-03-19 DIAGNOSIS — Z12.11 SCREEN FOR COLON CANCER: Primary | ICD-10-CM

## 2020-03-19 PROCEDURE — 3079F DIAST BP 80-89 MM HG: CPT | Performed by: INTERNAL MEDICINE

## 2020-03-19 PROCEDURE — 3075F SYST BP GE 130 - 139MM HG: CPT | Performed by: INTERNAL MEDICINE

## 2020-03-19 PROCEDURE — 99214 OFFICE O/P EST MOD 30 MIN: CPT | Performed by: INTERNAL MEDICINE

## 2020-03-19 PROCEDURE — 1036F TOBACCO NON-USER: CPT | Performed by: INTERNAL MEDICINE

## 2020-03-19 PROCEDURE — 3008F BODY MASS INDEX DOCD: CPT | Performed by: INTERNAL MEDICINE

## 2020-03-19 RX ORDER — SCOLOPAMINE TRANSDERMAL SYSTEM 1 MG/1
1 PATCH, EXTENDED RELEASE TRANSDERMAL
Qty: 10 PATCH | Refills: 0 | Status: SHIPPED | OUTPATIENT
Start: 2020-03-19 | End: 2020-09-30

## 2020-03-19 NOTE — PROGRESS NOTES
Assessment/Plan:    Laryngopharyngeal reflux (LPR)  Continue Prevacid 30 mg daily as needed  Allergic rhinitis  Continue Flonase as needed  Essential hypertension  Continue lisinopril 10 mg daily and diltiazem 180 mg daily  Paroxysmal SVT (supraventricular tachycardia) (HCC)  Continue diltiazem 180 mg daily  Synovial cyst of right popliteal space  Follow up with orthopedic surgery  Tension headache  Continue tylenol/NSAIDs, massage therapy, heating pads, and Tens unit  Diagnoses and all orders for this visit:    Screen for colon cancer  -     Cologuard; Future    Laryngopharyngeal reflux (LPR)    Seasonal allergic rhinitis due to pollen  -     CBC; Future    Multinodular goiter  -     CBC; Future  -     Comprehensive metabolic panel; Future  -     TSH, 3rd generation with Free T4 reflex; Future    Paroxysmal SVT (supraventricular tachycardia) (HCC)    Essential hypertension  -     CBC; Future  -     Comprehensive metabolic panel; Future  -     Hemoglobin A1C; Future  -     Lipid panel; Future    Synovial cyst of right popliteal space    Tension headache    Screening for diabetes mellitus  -     Hemoglobin A1C; Future    Lipid screening  -     Lipid panel; Future    Motion sickness, initial encounter  -     scopolamine (TRANSDERM-SCOP) 1 5 mg/3 days TD 72 hr patch; Place 1 patch on the skin every third day          BMI Counseling: Body mass index is 39 49 kg/m²  The BMI is above normal  Nutrition recommendations include decreasing portion sizes, encouraging healthy choices of fruits and vegetables, decreasing fast food intake, consuming healthier snacks, limiting drinks that contain sugar, moderation in carbohydrate intake, increasing intake of lean protein, reducing intake of saturated and trans fat and reducing intake of cholesterol  Exercise recommendations include moderate physical activity 150 minutes/week and exercising 3-5 times per week  No pharmacotherapy was ordered   Patient referred to PCP due to patient being overweight  Depression Screening and Follow-up Plan: Patient's depression screening was positive with a PHQ-2 score of 0  Clincally patient does not have depression  No treatment is required  Time spent during encounter: 25 minutes (counseling, reviewing medications, and discussing treatment and plan)    Subjective:      Patient ID: Reilly Bojorquez is a 47 y o  male  Chief Complaint   Patient presents with    Follow-up     6 month follow up  C/O neck and head pian  cyst on back of right knee filled up again  also disucss sea sickness medication      Hypertension       26-year-old male is seen today for follow-up of chronic conditions  No labs to review today  He has been experiencing posterior neck pain with radiation up the back of his head  He admits to having increased stress from work and the pandemic  He denies any focal neurologic deficits  He has a Baker cyst of the right knee which has reacumulated  He was seen by orthopedics last year and had the cyst drained, approximately 50 cc  He is planning on going on a cruise this upcoming May and is interested in seasickness medication  Hypertension   This is a chronic problem  The current episode started more than 1 year ago  The problem is unchanged  The problem is controlled  Associated symptoms include anxiety  Pertinent negatives include no chest pain, palpitations or shortness of breath  Past treatments include calcium channel blockers and ACE inhibitors  The current treatment provides moderate improvement  There are no compliance problems  Heartburn   He reports no abdominal pain, no chest pain, no coughing, no heartburn, no nausea, no sore throat or no wheezing  This is a chronic problem  The current episode started more than 1 year ago  The problem occurs rarely  Pertinent negatives include no fatigue  He has tried a PPI for the symptoms  The treatment provided moderate relief         The following portions of the patient's history were reviewed and updated as appropriate: allergies, current medications, past family history, past medical history, past social history, past surgical history and problem list     Review of Systems   Constitutional: Negative  Negative for chills, fatigue and fever  HENT: Negative for congestion, ear pain, postnasal drip, rhinorrhea and sore throat  Eyes: Negative  Respiratory: Negative for cough, chest tightness, shortness of breath and wheezing  Cardiovascular: Negative for chest pain and palpitations  Gastrointestinal: Negative for abdominal distention, abdominal pain, blood in stool, constipation, diarrhea, heartburn and nausea  Endocrine: Negative  Genitourinary: Negative for difficulty urinating, dysuria and hematuria  Musculoskeletal: Negative  Skin: Negative  Allergic/Immunologic: Negative for environmental allergies and food allergies  Neurological: Negative  Hematological: Negative for adenopathy  Psychiatric/Behavioral: Negative for agitation, behavioral problems, confusion and sleep disturbance           Past Medical History:   Diagnosis Date    Herpes zoster without complication 8/0/5358    Hypertension     Irregular heart beat     Kidney stone     Multinodular goiter 12/12/2018    Sleep apnea          Current Outpatient Medications:     diltiazem (CARDIZEM CD) 180 mg 24 hr capsule, Take 1 capsule (180 mg total) by mouth daily, Disp: 90 capsule, Rfl: 1    fluticasone (FLONASE) 50 mcg/act nasal spray, 2 sprays into each nostril daily at bedtime as needed, Disp: , Rfl:     lansoprazole (PREVACID) 30 mg capsule, Take 1 capsule (30 mg total) by mouth as needed (LPR), Disp: 90 capsule, Rfl: 1    lisinopril (ZESTRIL) 10 mg tablet, Take 1 tablet (10 mg total) by mouth daily, Disp: 90 tablet, Rfl: 1    scopolamine (TRANSDERM-SCOP) 1 5 mg/3 days TD 72 hr patch, Place 1 patch on the skin every third day, Disp: 10 patch, Rfl: 0    No Known Allergies    Social History   Past Surgical History:   Procedure Laterality Date    US GUIDED MSK PROCEDURE  4/23/2019    US GUIDED THYROID BIOPSY  12/28/2018     Family History   Problem Relation Age of Onset    Hyperlipidemia Mother     Heart block Mother     Kidney nephrosis Father     Stroke Maternal Grandmother     No Known Problems Maternal Grandfather     Cancer Paternal Grandmother     Heart disease Paternal Grandfather        Objective:  /86 (BP Location: Left arm, Patient Position: Sitting, Cuff Size: Adult)   Pulse 81   Temp 98 2 °F (36 8 °C) (Oral)   Ht 5' 10" (1 778 m)   Wt 125 kg (275 lb 3 2 oz)   SpO2 97%   BMI 39 49 kg/m²     No results found for this or any previous visit (from the past 1344 hour(s))  Physical Exam   Constitutional: He is oriented to person, place, and time  He appears well-developed and well-nourished  No distress  HENT:   Head: Normocephalic and atraumatic  Eyes: Pupils are equal, round, and reactive to light  Conjunctivae and EOM are normal  Right eye exhibits no discharge  Left eye exhibits no discharge  No scleral icterus  Neck: Normal range of motion  Neck supple  No JVD present  No thyromegaly present  Cardiovascular: Normal rate, regular rhythm, normal heart sounds and intact distal pulses  Exam reveals no gallop and no friction rub  No murmur heard  Pulmonary/Chest: Effort normal and breath sounds normal  No respiratory distress  He has no wheezes  He has no rales  He exhibits no tenderness  Abdominal: Soft  Bowel sounds are normal  He exhibits no distension and no mass  There is no tenderness  There is no rebound and no guarding  Musculoskeletal: Normal range of motion  He exhibits no edema, tenderness or deformity  Lymphadenopathy:     He has no cervical adenopathy  Neurological: He is alert and oriented to person, place, and time  He has normal reflexes  No cranial nerve deficit   Coordination normal    Skin: Skin is warm and dry  No rash noted  He is not diaphoretic  No erythema  No pallor  Psychiatric: He has a normal mood and affect  His behavior is normal  Judgment and thought content normal    Nursing note and vitals reviewed

## 2020-03-24 ENCOUNTER — APPOINTMENT (OUTPATIENT)
Dept: LAB | Age: 50
End: 2020-03-24
Payer: COMMERCIAL

## 2020-03-24 DIAGNOSIS — Z13.1 SCREENING FOR DIABETES MELLITUS: ICD-10-CM

## 2020-03-24 DIAGNOSIS — Z13.220 LIPID SCREENING: ICD-10-CM

## 2020-03-24 DIAGNOSIS — J30.1 SEASONAL ALLERGIC RHINITIS DUE TO POLLEN: ICD-10-CM

## 2020-03-24 DIAGNOSIS — I10 ESSENTIAL HYPERTENSION: ICD-10-CM

## 2020-03-24 DIAGNOSIS — E04.2 MULTINODULAR GOITER: ICD-10-CM

## 2020-03-24 LAB
ALBUMIN SERPL BCP-MCNC: 3.9 G/DL (ref 3.5–5)
ALP SERPL-CCNC: 95 U/L (ref 46–116)
ALT SERPL W P-5'-P-CCNC: 55 U/L (ref 12–78)
ANION GAP SERPL CALCULATED.3IONS-SCNC: 4 MMOL/L (ref 4–13)
AST SERPL W P-5'-P-CCNC: 24 U/L (ref 5–45)
BILIRUB SERPL-MCNC: 0.71 MG/DL (ref 0.2–1)
BUN SERPL-MCNC: 16 MG/DL (ref 5–25)
CALCIUM SERPL-MCNC: 9.1 MG/DL (ref 8.3–10.1)
CHLORIDE SERPL-SCNC: 107 MMOL/L (ref 100–108)
CHOLEST SERPL-MCNC: 143 MG/DL (ref 50–200)
CO2 SERPL-SCNC: 28 MMOL/L (ref 21–32)
CREAT SERPL-MCNC: 0.91 MG/DL (ref 0.6–1.3)
ERYTHROCYTE [DISTWIDTH] IN BLOOD BY AUTOMATED COUNT: 12.6 % (ref 11.6–15.1)
EST. AVERAGE GLUCOSE BLD GHB EST-MCNC: 108 MG/DL
GFR SERPL CREATININE-BSD FRML MDRD: 98 ML/MIN/1.73SQ M
GLUCOSE P FAST SERPL-MCNC: 102 MG/DL (ref 65–99)
HBA1C MFR BLD: 5.4 %
HCT VFR BLD AUTO: 44.8 % (ref 36.5–49.3)
HDLC SERPL-MCNC: 46 MG/DL
HGB BLD-MCNC: 15.2 G/DL (ref 12–17)
LDLC SERPL CALC-MCNC: 90 MG/DL (ref 0–100)
MCH RBC QN AUTO: 29.8 PG (ref 26.8–34.3)
MCHC RBC AUTO-ENTMCNC: 33.9 G/DL (ref 31.4–37.4)
MCV RBC AUTO: 88 FL (ref 82–98)
NONHDLC SERPL-MCNC: 97 MG/DL
PLATELET # BLD AUTO: 281 THOUSANDS/UL (ref 149–390)
PMV BLD AUTO: 9.3 FL (ref 8.9–12.7)
POTASSIUM SERPL-SCNC: 4.6 MMOL/L (ref 3.5–5.3)
PROT SERPL-MCNC: 7.2 G/DL (ref 6.4–8.2)
RBC # BLD AUTO: 5.1 MILLION/UL (ref 3.88–5.62)
SODIUM SERPL-SCNC: 139 MMOL/L (ref 136–145)
TRIGL SERPL-MCNC: 37 MG/DL
TSH SERPL DL<=0.05 MIU/L-ACNC: 1.96 UIU/ML (ref 0.36–3.74)
WBC # BLD AUTO: 7.14 THOUSAND/UL (ref 4.31–10.16)

## 2020-03-24 PROCEDURE — 83036 HEMOGLOBIN GLYCOSYLATED A1C: CPT

## 2020-03-24 PROCEDURE — 36415 COLL VENOUS BLD VENIPUNCTURE: CPT

## 2020-03-24 PROCEDURE — 80061 LIPID PANEL: CPT

## 2020-03-24 PROCEDURE — 84443 ASSAY THYROID STIM HORMONE: CPT

## 2020-03-24 PROCEDURE — 80053 COMPREHEN METABOLIC PANEL: CPT

## 2020-03-24 PROCEDURE — 85027 COMPLETE CBC AUTOMATED: CPT

## 2020-03-30 ENCOUNTER — TELEPHONE (OUTPATIENT)
Dept: INTERNAL MEDICINE CLINIC | Facility: CLINIC | Age: 50
End: 2020-03-30

## 2020-03-30 NOTE — TELEPHONE ENCOUNTER
----- Message from Ely Grijalva MD sent at 3/30/2020 12:01 PM EDT -----  Please contact patient to inform him that I reviewed the results of laboratory studies  No concerning findings at this time  Continue current medication regimen

## 2020-05-22 DIAGNOSIS — I47.1 PAROXYSMAL SVT (SUPRAVENTRICULAR TACHYCARDIA) (HCC): ICD-10-CM

## 2020-05-22 DIAGNOSIS — I10 ESSENTIAL HYPERTENSION: ICD-10-CM

## 2020-05-22 RX ORDER — DILTIAZEM HYDROCHLORIDE 180 MG/1
180 CAPSULE, COATED, EXTENDED RELEASE ORAL DAILY
Qty: 90 CAPSULE | Refills: 1 | Status: SHIPPED | OUTPATIENT
Start: 2020-05-22 | End: 2020-09-30 | Stop reason: SDUPTHER

## 2020-05-22 RX ORDER — LISINOPRIL 10 MG/1
10 TABLET ORAL DAILY
Qty: 90 TABLET | Refills: 1 | Status: SHIPPED | OUTPATIENT
Start: 2020-05-22 | End: 2020-09-30 | Stop reason: SDUPTHER

## 2020-09-30 ENCOUNTER — OFFICE VISIT (OUTPATIENT)
Dept: INTERNAL MEDICINE CLINIC | Facility: CLINIC | Age: 50
End: 2020-09-30
Payer: COMMERCIAL

## 2020-09-30 VITALS
HEART RATE: 91 BPM | OXYGEN SATURATION: 96 % | DIASTOLIC BLOOD PRESSURE: 86 MMHG | WEIGHT: 279 LBS | SYSTOLIC BLOOD PRESSURE: 138 MMHG | BODY MASS INDEX: 39.94 KG/M2 | HEIGHT: 70 IN | TEMPERATURE: 98 F

## 2020-09-30 DIAGNOSIS — Z13.220 ENCOUNTER FOR LIPID SCREENING FOR CARDIOVASCULAR DISEASE: ICD-10-CM

## 2020-09-30 DIAGNOSIS — K21.9 LARYNGOPHARYNGEAL REFLUX (LPR): ICD-10-CM

## 2020-09-30 DIAGNOSIS — E04.2 MULTINODULAR GOITER: ICD-10-CM

## 2020-09-30 DIAGNOSIS — E66.9 NON MORBID OBESITY, UNSPECIFIED OBESITY TYPE: ICD-10-CM

## 2020-09-30 DIAGNOSIS — G44.209 TENSION HEADACHE: ICD-10-CM

## 2020-09-30 DIAGNOSIS — I47.1 PAROXYSMAL SVT (SUPRAVENTRICULAR TACHYCARDIA) (HCC): ICD-10-CM

## 2020-09-30 DIAGNOSIS — J30.1 SEASONAL ALLERGIC RHINITIS DUE TO POLLEN: Primary | ICD-10-CM

## 2020-09-30 DIAGNOSIS — Z13.6 ENCOUNTER FOR LIPID SCREENING FOR CARDIOVASCULAR DISEASE: ICD-10-CM

## 2020-09-30 DIAGNOSIS — I10 ESSENTIAL HYPERTENSION: ICD-10-CM

## 2020-09-30 PROBLEM — R06.09 DYSPNEA ON EXERTION: Status: ACTIVE | Noted: 2020-09-30

## 2020-09-30 PROBLEM — R06.00 DYSPNEA ON EXERTION: Status: ACTIVE | Noted: 2020-09-30

## 2020-09-30 PROCEDURE — 99214 OFFICE O/P EST MOD 30 MIN: CPT | Performed by: INTERNAL MEDICINE

## 2020-09-30 RX ORDER — LISINOPRIL 10 MG/1
10 TABLET ORAL DAILY
Qty: 90 TABLET | Refills: 1 | Status: SHIPPED | OUTPATIENT
Start: 2020-09-30 | End: 2021-03-31 | Stop reason: SDUPTHER

## 2020-09-30 RX ORDER — LANSOPRAZOLE 30 MG/1
30 CAPSULE, DELAYED RELEASE ORAL AS NEEDED
Qty: 90 CAPSULE | Refills: 1 | Status: SHIPPED | OUTPATIENT
Start: 2020-09-30 | End: 2021-03-31 | Stop reason: SDUPTHER

## 2020-09-30 RX ORDER — DILTIAZEM HYDROCHLORIDE 180 MG/1
180 CAPSULE, COATED, EXTENDED RELEASE ORAL DAILY
Qty: 90 CAPSULE | Refills: 1 | Status: SHIPPED | OUTPATIENT
Start: 2020-09-30 | End: 2021-03-31 | Stop reason: SDUPTHER

## 2020-09-30 NOTE — PROGRESS NOTES
Assessment/Plan:    Essential hypertension  Controlled  Continue lisinopril 10 mg daily and cardizem 180 mg daily  Tension headache  Continue tylenol/NSAIDs as needed  Dyspnea on exertion  Continue to monitor clinically  He has appointment cardiology tomorrow to which I recommend he discuss with his cardiologist   It does not seem to be related to respiratory or GI  Non morbid obesity, unspecified obesity type  Discussed lifestyle modification with diet, exercise, weight loss  Paroxysmal SVT (supraventricular tachycardia) (HCC)  Continue Cardizem 180 mg daily  Follow-up Cardiology  Allergic rhinitis  Continue as needed Flonase  Multinodular goiter  Asymptomatic  Check TSH  Laryngopharyngeal reflux (LPR)  Continue Prevacid  Follow-up with ENT  Diagnoses and all orders for this visit:    Seasonal allergic rhinitis due to pollen    Essential hypertension  -     CBC; Future  -     Comprehensive metabolic panel; Future  -     Lipid panel; Future  -     lisinopril (ZESTRIL) 10 mg tablet; Take 1 tablet (10 mg total) by mouth daily    Non morbid obesity, unspecified obesity type    Tension headache    Encounter for lipid screening for cardiovascular disease  -     Lipid panel; Future    Paroxysmal SVT (supraventricular tachycardia) (HCC)  -     diltiazem (CARDIZEM CD) 180 mg 24 hr capsule; Take 1 capsule (180 mg total) by mouth daily    Laryngopharyngeal reflux (LPR)  -     lansoprazole (PREVACID) 30 mg capsule; Take 1 capsule (30 mg total) by mouth as needed (LPR)    Multinodular goiter  -     TSH, 3rd generation with Free T4 reflex; Future          BMI Counseling: Body mass index is 40 03 kg/m²   The BMI is above normal  Nutrition recommendations include decreasing portion sizes, encouraging healthy choices of fruits and vegetables, decreasing fast food intake, consuming healthier snacks, limiting drinks that contain sugar, moderation in carbohydrate intake, increasing intake of lean protein, reducing intake of saturated and trans fat and reducing intake of cholesterol  Exercise recommendations include moderate physical activity 150 minutes/week and exercising 3-5 times per week  No pharmacotherapy was ordered  Patient referred to PCP due to patient being overweight  Depression Screening and Follow-up Plan: Patient's depression screening was positive with a PHQ-2 score of 0  Clincally patient does not have depression  No treatment is required  Time spent during encounter: 25 minutes (counseling, reviewing medications, and discussing treatment and plan)    Subjective:      Patient ID: Betsy Durán is a 45 y o  male  Chief Complaint   Patient presents with    Follow-up     6 month follow up C/O heavy breathing on excertion        45year old male is seen today for for follow up of chronic conditions  No labs to review today  He has been experiencing [place] over the past few months  He also feels slightly winded during exercise or with minimal exertion  He denies any chest pain, palpitations, swelling in his legs, lightheadedness or dizziness  He is scheduled to see his cardiologist tomorrow and his ENT doctor within the next month  Hypertension   This is a chronic problem  The current episode started more than 1 year ago  The problem is unchanged  The problem is controlled  Pertinent negatives include no chest pain, headaches, palpitations or shortness of breath  Past treatments include ACE inhibitors and calcium channel blockers  The current treatment provides moderate improvement  There are no compliance problems  Heartburn   He complains of heartburn  He reports no abdominal pain, no chest pain, no choking, no coughing, no nausea, no sore throat or no wheezing  This is a chronic problem  The current episode started more than 1 year ago  The problem occurs occasionally  The problem has been unchanged  Pertinent negatives include no fatigue   He has tried a PPI for the symptoms  The treatment provided moderate relief  The following portions of the patient's history were reviewed and updated as appropriate: allergies, current medications, past family history, past medical history, past social history, past surgical history and problem list     Review of Systems   Constitutional: Negative for activity change, appetite change, chills, diaphoresis, fatigue and fever  HENT: Negative for congestion, postnasal drip, rhinorrhea, sinus pressure, sinus pain, sneezing and sore throat  Eyes: Negative for visual disturbance  Respiratory: Negative for apnea, cough, choking, chest tightness, shortness of breath and wheezing  Cardiovascular: Negative for chest pain, palpitations and leg swelling  Gastrointestinal: Positive for heartburn  Negative for abdominal distention, abdominal pain, anal bleeding, blood in stool, constipation, diarrhea, nausea and vomiting  Endocrine: Negative for cold intolerance and heat intolerance  Genitourinary: Negative for difficulty urinating, dysuria and hematuria  Musculoskeletal: Negative  Skin: Negative  Neurological: Negative for dizziness, weakness, light-headedness, numbness and headaches  Hematological: Negative for adenopathy  Psychiatric/Behavioral: Negative for agitation, sleep disturbance and suicidal ideas  All other systems reviewed and are negative          Past Medical History:   Diagnosis Date    Herpes zoster without complication 4/0/3014    Hypertension     Irregular heart beat     Kidney stone     Multinodular goiter 12/12/2018    Sleep apnea          Current Outpatient Medications:     diltiazem (CARDIZEM CD) 180 mg 24 hr capsule, Take 1 capsule (180 mg total) by mouth daily, Disp: 90 capsule, Rfl: 1    fluticasone (FLONASE) 50 mcg/act nasal spray, 2 sprays into each nostril daily at bedtime as needed, Disp: , Rfl:     lansoprazole (PREVACID) 30 mg capsule, Take 1 capsule (30 mg total) by mouth as needed (LPR), Disp: 90 capsule, Rfl: 1    lisinopril (ZESTRIL) 10 mg tablet, Take 1 tablet (10 mg total) by mouth daily, Disp: 90 tablet, Rfl: 1    No Known Allergies    Social History   Past Surgical History:   Procedure Laterality Date    US GUIDED MSK PROCEDURE  4/23/2019    US GUIDED THYROID BIOPSY  12/28/2018     Family History   Problem Relation Age of Onset    Hyperlipidemia Mother     Heart block Mother     Stroke Mother     Kidney nephrosis Father     Stroke Maternal Grandmother     No Known Problems Maternal Grandfather     Cancer Paternal Grandmother     Heart disease Paternal Grandfather        Objective:  /86 (BP Location: Left arm, Patient Position: Sitting, Cuff Size: Large)   Pulse 91   Temp 98 °F (36 7 °C)   Ht 5' 10" (1 778 m)   Wt 127 kg (279 lb)   SpO2 96%   BMI 40 03 kg/m²     No results found for this or any previous visit (from the past 1344 hour(s))  Physical Exam  Constitutional:       General: He is not in acute distress  Appearance: He is well-developed  He is not diaphoretic  HENT:      Head: Normocephalic and atraumatic  Eyes:      General: No scleral icterus  Right eye: No discharge  Left eye: No discharge  Conjunctiva/sclera: Conjunctivae normal       Pupils: Pupils are equal, round, and reactive to light  Neck:      Musculoskeletal: Normal range of motion and neck supple  Thyroid: No thyromegaly  Vascular: No JVD  Cardiovascular:      Rate and Rhythm: Normal rate and regular rhythm  Heart sounds: Normal heart sounds  No murmur  No friction rub  No gallop  Pulmonary:      Effort: Pulmonary effort is normal  No respiratory distress  Breath sounds: Normal breath sounds  No wheezing or rales  Chest:      Chest wall: No tenderness  Abdominal:      General: Bowel sounds are normal  There is no distension  Palpations: Abdomen is soft  There is no mass  Tenderness:  There is no abdominal tenderness  There is no guarding or rebound  Musculoskeletal: Normal range of motion  General: No tenderness or deformity  Lymphadenopathy:      Cervical: No cervical adenopathy  Skin:     General: Skin is warm and dry  Coloration: Skin is not pale  Findings: No erythema or rash  Neurological:      Mental Status: He is alert and oriented to person, place, and time  Cranial Nerves: No cranial nerve deficit  Coordination: Coordination normal       Deep Tendon Reflexes: Reflexes are normal and symmetric  Psychiatric:         Behavior: Behavior normal          Thought Content:  Thought content normal          Judgment: Judgment normal

## 2020-09-30 NOTE — ASSESSMENT & PLAN NOTE
Continue to monitor clinically  He has appointment cardiology tomorrow to which I recommend he discuss with his cardiologist   It does not seem to be related to respiratory or GI

## 2020-10-01 ENCOUNTER — OFFICE VISIT (OUTPATIENT)
Dept: CARDIOLOGY CLINIC | Facility: CLINIC | Age: 50
End: 2020-10-01
Payer: COMMERCIAL

## 2020-10-01 VITALS
HEART RATE: 88 BPM | BODY MASS INDEX: 40.09 KG/M2 | WEIGHT: 279.4 LBS | TEMPERATURE: 98.7 F | DIASTOLIC BLOOD PRESSURE: 70 MMHG | SYSTOLIC BLOOD PRESSURE: 140 MMHG

## 2020-10-01 DIAGNOSIS — G47.33 OSA ON CPAP: ICD-10-CM

## 2020-10-01 DIAGNOSIS — I10 HTN (HYPERTENSION), BENIGN: ICD-10-CM

## 2020-10-01 DIAGNOSIS — R06.00 DOE (DYSPNEA ON EXERTION): ICD-10-CM

## 2020-10-01 DIAGNOSIS — Z99.89 OSA ON CPAP: ICD-10-CM

## 2020-10-01 DIAGNOSIS — I47.1 PAROXYSMAL SVT (SUPRAVENTRICULAR TACHYCARDIA) (HCC): Primary | ICD-10-CM

## 2020-10-01 PROCEDURE — 99214 OFFICE O/P EST MOD 30 MIN: CPT | Performed by: INTERNAL MEDICINE

## 2020-10-29 ENCOUNTER — HOSPITAL ENCOUNTER (OUTPATIENT)
Dept: NON INVASIVE DIAGNOSTICS | Facility: CLINIC | Age: 50
Discharge: HOME | End: 2020-10-29
Payer: COMMERCIAL

## 2020-10-29 DIAGNOSIS — I10 HTN (HYPERTENSION), BENIGN: ICD-10-CM

## 2020-10-29 DIAGNOSIS — R06.00 DOE (DYSPNEA ON EXERTION): ICD-10-CM

## 2020-10-29 LAB
CHEST PAIN STATEMENT: NORMAL
MAX DIASTOLIC BP: 88 MMHG
MAX HEART RATE: 157 BPM
MAX PREDICTED HEART RATE: 169 BPM
MAX. SYSTOLIC BP: 170 MMHG
PROTOCOL NAME: NORMAL
REASON FOR TERMINATION: NORMAL
TARGET HR FORMULA: NORMAL
TEST INDICATION: NORMAL
TIME IN EXERCISE PHASE: NORMAL

## 2020-10-29 PROCEDURE — 93350 STRESS TTE ONLY: CPT

## 2020-10-29 PROCEDURE — 93351 STRESS TTE COMPLETE: CPT | Performed by: INTERNAL MEDICINE

## 2020-11-11 ENCOUNTER — TELEPHONE (OUTPATIENT)
Dept: CARDIOLOGY CLINIC | Facility: CLINIC | Age: 50
End: 2020-11-11

## 2020-12-28 ENCOUNTER — IMMUNIZATIONS (OUTPATIENT)
Dept: FAMILY MEDICINE CLINIC | Facility: HOSPITAL | Age: 50
End: 2020-12-28
Payer: COMMERCIAL

## 2020-12-28 DIAGNOSIS — Z23 ENCOUNTER FOR IMMUNIZATION: ICD-10-CM

## 2020-12-28 PROCEDURE — 91301 SARS-COV-2 / COVID-19 MRNA VACCINE (MODERNA) 100 MCG: CPT

## 2020-12-28 PROCEDURE — 0011A SARS-COV-2 / COVID-19 MRNA VACCINE (MODERNA) 100 MCG: CPT

## 2021-01-25 ENCOUNTER — IMMUNIZATIONS (OUTPATIENT)
Dept: FAMILY MEDICINE CLINIC | Facility: HOSPITAL | Age: 51
End: 2021-01-25

## 2021-01-25 DIAGNOSIS — Z23 ENCOUNTER FOR IMMUNIZATION: Primary | ICD-10-CM

## 2021-01-25 PROCEDURE — 91301 SARS-COV-2 / COVID-19 MRNA VACCINE (MODERNA) 100 MCG: CPT

## 2021-01-25 PROCEDURE — 0012A SARS-COV-2 / COVID-19 MRNA VACCINE (MODERNA) 100 MCG: CPT

## 2021-02-22 DIAGNOSIS — Z12.11 SCREENING FOR MALIGNANT NEOPLASM OF COLON: Primary | ICD-10-CM

## 2021-02-22 NOTE — PROGRESS NOTES
LMOM to patient:  Spoke with Luz Morris from [employer]  and confirmed [employer] is not cover by the insurance [id number]  I am sending fit kit to the patient  Patient is aware

## 2021-03-17 NOTE — PROGRESS NOTES
LMOM to patient to call back the office  Patient needs to return the fecal Immunochemical test so he can screen for CRC

## 2021-03-22 ENCOUNTER — APPOINTMENT (OUTPATIENT)
Dept: LAB | Age: 51
End: 2021-03-22
Payer: COMMERCIAL

## 2021-03-22 DIAGNOSIS — Z13.220 ENCOUNTER FOR LIPID SCREENING FOR CARDIOVASCULAR DISEASE: ICD-10-CM

## 2021-03-22 DIAGNOSIS — I10 ESSENTIAL HYPERTENSION: ICD-10-CM

## 2021-03-22 DIAGNOSIS — E04.2 MULTINODULAR GOITER: ICD-10-CM

## 2021-03-22 DIAGNOSIS — Z12.11 SCREENING FOR MALIGNANT NEOPLASM OF COLON: ICD-10-CM

## 2021-03-22 DIAGNOSIS — Z13.6 ENCOUNTER FOR LIPID SCREENING FOR CARDIOVASCULAR DISEASE: ICD-10-CM

## 2021-03-22 LAB
ALBUMIN SERPL BCP-MCNC: 3.9 G/DL (ref 3.5–5)
ALP SERPL-CCNC: 86 U/L (ref 46–116)
ALT SERPL W P-5'-P-CCNC: 60 U/L (ref 12–78)
ANION GAP SERPL CALCULATED.3IONS-SCNC: 4 MMOL/L (ref 4–13)
AST SERPL W P-5'-P-CCNC: 21 U/L (ref 5–45)
BILIRUB SERPL-MCNC: 0.54 MG/DL (ref 0.2–1)
BUN SERPL-MCNC: 19 MG/DL (ref 5–25)
CALCIUM SERPL-MCNC: 9 MG/DL (ref 8.3–10.1)
CHLORIDE SERPL-SCNC: 110 MMOL/L (ref 100–108)
CHOLEST SERPL-MCNC: 146 MG/DL (ref 50–200)
CO2 SERPL-SCNC: 27 MMOL/L (ref 21–32)
CREAT SERPL-MCNC: 1.04 MG/DL (ref 0.6–1.3)
ERYTHROCYTE [DISTWIDTH] IN BLOOD BY AUTOMATED COUNT: 13.1 % (ref 11.6–15.1)
GFR SERPL CREATININE-BSD FRML MDRD: 83 ML/MIN/1.73SQ M
GLUCOSE P FAST SERPL-MCNC: 98 MG/DL (ref 65–99)
HCT VFR BLD AUTO: 43.6 % (ref 36.5–49.3)
HDLC SERPL-MCNC: 47 MG/DL
HEMOCCULT STL QL IA: NEGATIVE
HGB BLD-MCNC: 14.9 G/DL (ref 12–17)
LDLC SERPL CALC-MCNC: 89 MG/DL (ref 0–100)
MCH RBC QN AUTO: 30.2 PG (ref 26.8–34.3)
MCHC RBC AUTO-ENTMCNC: 34.2 G/DL (ref 31.4–37.4)
MCV RBC AUTO: 88 FL (ref 82–98)
NONHDLC SERPL-MCNC: 99 MG/DL
PLATELET # BLD AUTO: 267 THOUSANDS/UL (ref 149–390)
PMV BLD AUTO: 9.1 FL (ref 8.9–12.7)
POTASSIUM SERPL-SCNC: 4.1 MMOL/L (ref 3.5–5.3)
PROT SERPL-MCNC: 7 G/DL (ref 6.4–8.2)
RBC # BLD AUTO: 4.93 MILLION/UL (ref 3.88–5.62)
SODIUM SERPL-SCNC: 141 MMOL/L (ref 136–145)
TRIGL SERPL-MCNC: 48 MG/DL
TSH SERPL DL<=0.05 MIU/L-ACNC: 1.36 UIU/ML (ref 0.36–3.74)
WBC # BLD AUTO: 7.91 THOUSAND/UL (ref 4.31–10.16)

## 2021-03-22 PROCEDURE — 80053 COMPREHEN METABOLIC PANEL: CPT

## 2021-03-22 PROCEDURE — 85027 COMPLETE CBC AUTOMATED: CPT

## 2021-03-22 PROCEDURE — 36415 COLL VENOUS BLD VENIPUNCTURE: CPT

## 2021-03-22 PROCEDURE — 84443 ASSAY THYROID STIM HORMONE: CPT

## 2021-03-22 PROCEDURE — 80061 LIPID PANEL: CPT

## 2021-03-22 PROCEDURE — G0328 FECAL BLOOD SCRN IMMUNOASSAY: HCPCS

## 2021-03-31 ENCOUNTER — OFFICE VISIT (OUTPATIENT)
Dept: INTERNAL MEDICINE CLINIC | Facility: CLINIC | Age: 51
End: 2021-03-31
Payer: COMMERCIAL

## 2021-03-31 VITALS
BODY MASS INDEX: 40.37 KG/M2 | TEMPERATURE: 99 F | SYSTOLIC BLOOD PRESSURE: 158 MMHG | DIASTOLIC BLOOD PRESSURE: 82 MMHG | WEIGHT: 282 LBS | OXYGEN SATURATION: 97 % | HEART RATE: 80 BPM | HEIGHT: 70 IN

## 2021-03-31 DIAGNOSIS — E66.01 MORBID OBESITY (HCC): ICD-10-CM

## 2021-03-31 DIAGNOSIS — I10 ESSENTIAL HYPERTENSION: ICD-10-CM

## 2021-03-31 DIAGNOSIS — I47.1 PAROXYSMAL SVT (SUPRAVENTRICULAR TACHYCARDIA) (HCC): ICD-10-CM

## 2021-03-31 DIAGNOSIS — K21.9 LARYNGOPHARYNGEAL REFLUX (LPR): ICD-10-CM

## 2021-03-31 DIAGNOSIS — E04.2 MULTINODULAR GOITER: Primary | ICD-10-CM

## 2021-03-31 PROBLEM — R06.09 DYSPNEA ON EXERTION: Status: RESOLVED | Noted: 2020-09-30 | Resolved: 2021-03-31

## 2021-03-31 PROBLEM — R06.00 DYSPNEA ON EXERTION: Status: RESOLVED | Noted: 2020-09-30 | Resolved: 2021-03-31

## 2021-03-31 PROCEDURE — 99214 OFFICE O/P EST MOD 30 MIN: CPT | Performed by: INTERNAL MEDICINE

## 2021-03-31 RX ORDER — LISINOPRIL 10 MG/1
10 TABLET ORAL DAILY
Qty: 90 TABLET | Refills: 1 | Status: SHIPPED | OUTPATIENT
Start: 2021-03-31 | End: 2021-04-29

## 2021-03-31 RX ORDER — LANSOPRAZOLE 30 MG/1
30 CAPSULE, DELAYED RELEASE ORAL AS NEEDED
Qty: 90 CAPSULE | Refills: 1 | Status: SHIPPED | OUTPATIENT
Start: 2021-03-31 | End: 2021-10-06 | Stop reason: SDUPTHER

## 2021-03-31 RX ORDER — DILTIAZEM HYDROCHLORIDE 180 MG/1
180 CAPSULE, COATED, EXTENDED RELEASE ORAL DAILY
Qty: 90 CAPSULE | Refills: 1 | Status: SHIPPED | OUTPATIENT
Start: 2021-03-31 | End: 2021-10-06 | Stop reason: SDUPTHER

## 2021-03-31 NOTE — PROGRESS NOTES
Assessment/Plan:    Laryngopharyngeal reflux (LPR)  Controlled  Continue Prevacid 30 mg daily  Essential hypertension  Elevated today however was previously controlled  Continue lisinopril 10 mg daily and Cardizem 180 mg daily  Will recheck in 1 month  In the meantime, he will check his blood pressure at home regularly  Multinodular goiter  Asymptomatic  TSH is within acceptable range  Continue to monitor clinically  Paroxysmal SVT (supraventricular tachycardia) (HCC)  Controlled, asymptomatic  Continue Cardizem 180 mg daily  Morbid obesity ([address] )  Discussed lifestyle modification with diet, exercise, weight loss  Diagnoses and all orders for this visit:    Multinodular goiter    Paroxysmal SVT (supraventricular tachycardia) (HCC)  -     diltiazem (CARDIZEM CD) 180 mg 24 hr capsule; Take 1 capsule (180 mg total) by mouth daily    Essential hypertension  -     lisinopril (ZESTRIL) 10 mg tablet; Take 1 tablet (10 mg total) by mouth daily    Laryngopharyngeal reflux (LPR)  -     lansoprazole (PREVACID) 30 mg capsule; Take 1 capsule (30 mg total) by mouth as needed (LPR)    Morbid obesity (HCC)          BMI Counseling: Body mass index is 40 46 kg/m²  The BMI is above normal  Nutrition recommendations include decreasing portion sizes, encouraging healthy choices of fruits and vegetables, decreasing fast food intake, consuming healthier snacks, limiting drinks that contain sugar, moderation in carbohydrate intake, increasing intake of lean protein, reducing intake of saturated and trans fat and reducing intake of cholesterol  Exercise recommendations include moderate physical activity 150 minutes/week and exercising 3-5 times per week  No pharmacotherapy was ordered  Patient referred to PCP due to patient being overweight  Depression Screening and Follow-up Plan: Patient's depression screening was positive with a PHQ-2 score of 0  Clincally patient does not have depression   No treatment is required  Patient advised to follow-up with PCP for further management  Time spent during encounter: 25 minutes (counseling, reviewing medications, and discussing treatment and plan)    Subjective:      Patient ID: Rich Stokes is a 45 y o  male  Chief Complaint   Patient presents with    Follow-up     6 month follow up and paperwork filled out or [employer] C/O left ear pain        76-year-old male seen today for follow-up of chronic conditions  Laboratory studies reviewed today, TSH, CBC, CMP, lipid panel are within acceptable range  Blood pressure is slightly elevated today  His blood pressure overall has been well controlled on current regimen, Cardizem 180 mg daily and lisinopril 10 mg daily  He admits to not being able to exercise as regularly as use to due to COVID-19 pandemic/restrictions  Otherwise, he has no active complaints or concerns at this time  He is compliant with medication regimen  Heartburn  He reports no abdominal pain, no chest pain, no choking, no coughing, no nausea, no sore throat or no wheezing  This is a chronic problem  The current episode started more than 1 year ago  The problem occurs occasionally  The problem has been unchanged  The symptoms are aggravated by certain foods  Pertinent negatives include no fatigue  He has tried a PPI for the symptoms  The treatment provided moderate relief  Hypertension  This is a chronic problem  The current episode started more than 1 year ago  The problem has been gradually worsening since onset  The problem is uncontrolled  Pertinent negatives include no chest pain, headaches, palpitations or shortness of breath  Past treatments include calcium channel blockers and ACE inhibitors  The current treatment provides moderate improvement  There are no compliance problems          The following portions of the patient's history were reviewed and updated as appropriate: allergies, current medications, past family history, past medical history, past social history, past surgical history and problem list     Review of Systems   Constitutional: Negative for activity change, appetite change, chills, diaphoresis, fatigue and fever  HENT: Negative for congestion, postnasal drip, rhinorrhea, sinus pressure, sinus pain, sneezing and sore throat  Eyes: Negative for visual disturbance  Respiratory: Negative for apnea, cough, choking, chest tightness, shortness of breath and wheezing  Cardiovascular: Negative for chest pain, palpitations and leg swelling  Gastrointestinal: Negative for abdominal distention, abdominal pain, anal bleeding, blood in stool, constipation, diarrhea, nausea and vomiting  Endocrine: Negative for cold intolerance and heat intolerance  Genitourinary: Negative for difficulty urinating, dysuria and hematuria  Musculoskeletal: Negative  Skin: Negative  Neurological: Negative for dizziness, weakness, light-headedness, numbness and headaches  Hematological: Negative for adenopathy  Psychiatric/Behavioral: Negative for agitation, sleep disturbance and suicidal ideas  All other systems reviewed and are negative          Past Medical History:   Diagnosis Date    Herpes zoster without complication 2/6/7425    Hypertension     Irregular heart beat     Kidney stone     Multinodular goiter 12/12/2018    Sleep apnea          Current Outpatient Medications:     diltiazem (CARDIZEM CD) 180 mg 24 hr capsule, Take 1 capsule (180 mg total) by mouth daily, Disp: 90 capsule, Rfl: 1    fluticasone (FLONASE) 50 mcg/act nasal spray, 2 sprays into each nostril daily at bedtime as needed, Disp: , Rfl:     lansoprazole (PREVACID) 30 mg capsule, Take 1 capsule (30 mg total) by mouth as needed (LPR), Disp: 90 capsule, Rfl: 1    lisinopril (ZESTRIL) 10 mg tablet, Take 1 tablet (10 mg total) by mouth daily, Disp: 90 tablet, Rfl: 1    No Known Allergies    Social History   Past Surgical History:   Procedure Laterality Date    US GUIDED MSK PROCEDURE  4/23/2019    US GUIDED THYROID BIOPSY  12/28/2018     Family History   Problem Relation Age of Onset    Hyperlipidemia Mother     Heart block Mother     Stroke Mother     Kidney nephrosis Father     Stroke Maternal Grandmother     No Known Problems Maternal Grandfather     Cancer Paternal Grandmother     Heart disease Paternal Grandfather        Objective:  /82 (BP Location: Left arm, Patient Position: Sitting, Cuff Size: Large)   Pulse 80   Temp 99 °F (37 2 °C)   Ht 5' 10" (1 778 m)   Wt 128 kg (282 lb)   SpO2 97%   BMI 40 46 kg/m²     Recent Results (from the past 1344 hour(s))   CBC    Collection Time: 03/22/21  7:12 AM   Result Value Ref Range    WBC 7 91 4 31 - 10 16 Thousand/uL    RBC 4 93 3 88 - 5 62 Million/uL    Hemoglobin 14 9 12 0 - 17 0 g/dL    Hematocrit 43 6 36 5 - 49 3 %    MCV 88 82 - 98 fL    MCH 30 2 26 8 - 34 3 pg    MCHC 34 2 31 4 - 37 4 g/dL    RDW 13 1 11 6 - 15 1 %    Platelets 836 413 - 836 Thousands/uL    MPV 9 1 8 9 - 12 7 fL   Comprehensive metabolic panel    Collection Time: 03/22/21  7:12 AM   Result Value Ref Range    Sodium 141 136 - 145 mmol/L    Potassium 4 1 3 5 - 5 3 mmol/L    Chloride 110 (H) 100 - 108 mmol/L    CO2 27 21 - 32 mmol/L    ANION GAP 4 4 - 13 mmol/L    BUN 19 5 - 25 mg/dL    Creatinine 1 04 0 60 - 1 30 mg/dL    Glucose, Fasting 98 65 - 99 mg/dL    Calcium 9 0 8 3 - 10 1 mg/dL    AST 21 5 - 45 U/L    ALT 60 12 - 78 U/L    Alkaline Phosphatase 86 46 - 116 U/L    Total Protein 7 0 6 4 - 8 2 g/dL    Albumin 3 9 3 5 - 5 0 g/dL    Total Bilirubin 0 54 0 20 - 1 00 mg/dL    eGFR 83 ml/min/1 73sq m   Lipid panel    Collection Time: 03/22/21  7:12 AM   Result Value Ref Range    Cholesterol 146 50 - 200 mg/dL    Triglycerides 48 <=150 mg/dL    HDL, Direct 47 >=40 mg/dL    LDL Calculated 89 0 - 100 mg/dL    Non-HDL-Chol (CHOL-HDL) 99 mg/dl   TSH, 3rd generation with Free T4 reflex    Collection Time: 03/22/21  7:12 AM Result Value Ref Range    TSH 3RD GENERATON 1 360 0 358 - 3 740 uIU/mL   Occult Blood, Fecal Immunochemical    Collection Time: 03/22/21  7:12 AM   Result Value Ref Range    OCCULT BLD, FECAL IMMUNOLOGICAL Negative Negative            Physical Exam  Vitals signs and nursing note reviewed  Constitutional:       General: He is not in acute distress  Appearance: He is well-developed  He is not diaphoretic  HENT:      Head: Normocephalic and atraumatic  Eyes:      General: No scleral icterus  Right eye: No discharge  Left eye: No discharge  Conjunctiva/sclera: Conjunctivae normal       Pupils: Pupils are equal, round, and reactive to light  Neck:      Musculoskeletal: Normal range of motion and neck supple  Thyroid: No thyromegaly  Vascular: No JVD  Cardiovascular:      Rate and Rhythm: Normal rate and regular rhythm  Heart sounds: Normal heart sounds  No murmur  No friction rub  No gallop  Pulmonary:      Effort: Pulmonary effort is normal  No respiratory distress  Breath sounds: Normal breath sounds  No wheezing or rales  Chest:      Chest wall: No tenderness  Abdominal:      General: Bowel sounds are normal  There is no distension  Palpations: Abdomen is soft  There is no mass  Tenderness: There is no abdominal tenderness  There is no guarding or rebound  Musculoskeletal: Normal range of motion  General: No tenderness or deformity  Lymphadenopathy:      Cervical: No cervical adenopathy  Skin:     General: Skin is warm and dry  Coloration: Skin is not pale  Findings: No erythema or rash  Neurological:      Mental Status: He is alert and oriented to person, place, and time  Cranial Nerves: No cranial nerve deficit  Coordination: Coordination normal       Deep Tendon Reflexes: Reflexes are normal and symmetric  Psychiatric:         Behavior: Behavior normal          Thought Content:  Thought content normal  Judgment: Judgment normal

## 2021-03-31 NOTE — ASSESSMENT & PLAN NOTE
Elevated today however was previously controlled  Continue lisinopril 10 mg daily and Cardizem 180 mg daily  Will recheck in 1 month  In the meantime, he will check his blood pressure at home regularly

## 2021-04-29 ENCOUNTER — OFFICE VISIT (OUTPATIENT)
Dept: INTERNAL MEDICINE CLINIC | Facility: CLINIC | Age: 51
End: 2021-04-29
Payer: COMMERCIAL

## 2021-04-29 VITALS
TEMPERATURE: 98.8 F | DIASTOLIC BLOOD PRESSURE: 87 MMHG | WEIGHT: 279 LBS | HEIGHT: 70 IN | SYSTOLIC BLOOD PRESSURE: 143 MMHG | OXYGEN SATURATION: 97 % | HEART RATE: 87 BPM | BODY MASS INDEX: 39.94 KG/M2

## 2021-04-29 DIAGNOSIS — I10 ESSENTIAL HYPERTENSION: ICD-10-CM

## 2021-04-29 PROCEDURE — 99213 OFFICE O/P EST LOW 20 MIN: CPT | Performed by: INTERNAL MEDICINE

## 2021-04-29 RX ORDER — LISINOPRIL 10 MG/1
20 TABLET ORAL DAILY
Qty: 90 TABLET | Refills: 1
Start: 2021-04-29 | End: 2021-07-13

## 2021-04-29 NOTE — PROGRESS NOTES
Assessment/Plan:    Essential hypertension  Stable  Continue lisinopril 20 mg daily and carvedilol 180 mg daily  Re-evaluate in 6 months  Diagnoses and all orders for this visit:    Essential hypertension  -     lisinopril (ZESTRIL) 10 mg tablet; Take 2 tablets (20 mg total) by mouth daily                  Subjective:      Patient ID: Katelin Amezcua is a 45 y o  male  Chief Complaint   Patient presents with    Follow-up     1 week follow up     Hypertension       62-year-old male seen today for follow-up of hypertension  He has been monitoring his blood pressure at home to which he has been recording average blood pressure reading of 145/90 within average pulse rate of 75 beats per minute  He has been compliant with his antihypertensive regimen and over the past 4 days has been taking 20 mg of lisinopril as opposed to 10 mg  He has been compliant with diltiazem 180 mg daily  Hypertension  This is a chronic problem  The current episode started more than 1 year ago  The problem has been gradually improving since onset  The problem is controlled  Pertinent negatives include no chest pain, palpitations or shortness of breath  Past treatments include ACE inhibitors and calcium channel blockers  The current treatment provides moderate improvement  There are no compliance problems  The following portions of the patient's history were reviewed and updated as appropriate: allergies, current medications, past family history, past medical history, past social history, past surgical history and problem list     Review of Systems   Constitutional: Negative  Negative for chills, fatigue and fever  HENT: Negative for congestion, ear pain, postnasal drip, rhinorrhea, sinus pressure, sinus pain, sneezing and sore throat  Eyes: Negative  Respiratory: Negative for apnea, cough, choking, chest tightness, shortness of breath and wheezing  Cardiovascular: Negative for chest pain and palpitations  Gastrointestinal: Negative for abdominal distention, abdominal pain, anal bleeding, blood in stool, constipation, diarrhea, nausea and vomiting  Endocrine: Negative  Genitourinary: Negative for difficulty urinating, dysuria and hematuria  Musculoskeletal: Negative  Skin: Negative  Allergic/Immunologic: Negative for environmental allergies and food allergies  Neurological: Negative  Hematological: Negative for adenopathy  Psychiatric/Behavioral: Negative for agitation, behavioral problems, confusion, sleep disturbance and suicidal ideas  All other systems reviewed and are negative          Past Medical History:   Diagnosis Date    Herpes zoster without complication 4/1/7810    Hypertension     Irregular heart beat     Kidney stone     Multinodular goiter 12/12/2018    Sleep apnea          Current Outpatient Medications:     diltiazem (CARDIZEM CD) 180 mg 24 hr capsule, Take 1 capsule (180 mg total) by mouth daily, Disp: 90 capsule, Rfl: 1    fluticasone (FLONASE) 50 mcg/act nasal spray, 2 sprays into each nostril daily at bedtime as needed, Disp: , Rfl:     lansoprazole (PREVACID) 30 mg capsule, Take 1 capsule (30 mg total) by mouth as needed (LPR), Disp: 90 capsule, Rfl: 1    lisinopril (ZESTRIL) 10 mg tablet, Take 2 tablets (20 mg total) by mouth daily, Disp: 90 tablet, Rfl: 1    No Known Allergies    Social History   Past Surgical History:   Procedure Laterality Date    US GUIDED MSK PROCEDURE  4/23/2019    US GUIDED THYROID BIOPSY  12/28/2018     Family History   Problem Relation Age of Onset    Hyperlipidemia Mother     Heart block Mother     Stroke Mother     Kidney nephrosis Father     Stroke Maternal Grandmother     No Known Problems Maternal Grandfather     Cancer Paternal Grandmother     Heart disease Paternal Grandfather        Objective:  /87   Pulse 87   Temp 98 8 °F (37 1 °C)   Ht 5' 10" (1 778 m)   Wt 127 kg (279 lb)   SpO2 97%   BMI 40 03 kg/m² Recent Results (from the past 1344 hour(s))   CBC    Collection Time: 03/22/21  7:12 AM   Result Value Ref Range    WBC 7 91 4 31 - 10 16 Thousand/uL    RBC 4 93 3 88 - 5 62 Million/uL    Hemoglobin 14 9 12 0 - 17 0 g/dL    Hematocrit 43 6 36 5 - 49 3 %    MCV 88 82 - 98 fL    MCH 30 2 26 8 - 34 3 pg    MCHC 34 2 31 4 - 37 4 g/dL    RDW 13 1 11 6 - 15 1 %    Platelets 924 386 - 495 Thousands/uL    MPV 9 1 8 9 - 12 7 fL   Comprehensive metabolic panel    Collection Time: 03/22/21  7:12 AM   Result Value Ref Range    Sodium 141 136 - 145 mmol/L    Potassium 4 1 3 5 - 5 3 mmol/L    Chloride 110 (H) 100 - 108 mmol/L    CO2 27 21 - 32 mmol/L    ANION GAP 4 4 - 13 mmol/L    BUN 19 5 - 25 mg/dL    Creatinine 1 04 0 60 - 1 30 mg/dL    Glucose, Fasting 98 65 - 99 mg/dL    Calcium 9 0 8 3 - 10 1 mg/dL    AST 21 5 - 45 U/L    ALT 60 12 - 78 U/L    Alkaline Phosphatase 86 46 - 116 U/L    Total Protein 7 0 6 4 - 8 2 g/dL    Albumin 3 9 3 5 - 5 0 g/dL    Total Bilirubin 0 54 0 20 - 1 00 mg/dL    eGFR 83 ml/min/1 73sq m   Lipid panel    Collection Time: 03/22/21  7:12 AM   Result Value Ref Range    Cholesterol 146 50 - 200 mg/dL    Triglycerides 48 <=150 mg/dL    HDL, Direct 47 >=40 mg/dL    LDL Calculated 89 0 - 100 mg/dL    Non-HDL-Chol (CHOL-HDL) 99 mg/dl   TSH, 3rd generation with Free T4 reflex    Collection Time: 03/22/21  7:12 AM   Result Value Ref Range    TSH 3RD GENERATON 1 360 0 358 - 3 740 uIU/mL   Occult Blood, Fecal Immunochemical    Collection Time: 03/22/21  7:12 AM   Result Value Ref Range    OCCULT BLD, FECAL IMMUNOLOGICAL Negative Negative            Physical Exam  Vitals signs and nursing note reviewed  Constitutional:       General: He is not in acute distress  Appearance: He is well-developed  He is not diaphoretic  HENT:      Head: Normocephalic and atraumatic  Eyes:      General: No scleral icterus  Right eye: No discharge  Left eye: No discharge        Conjunctiva/sclera: Conjunctivae normal       Pupils: Pupils are equal, round, and reactive to light  Neck:      Musculoskeletal: Normal range of motion and neck supple  Thyroid: No thyromegaly  Vascular: No JVD  Cardiovascular:      Rate and Rhythm: Normal rate and regular rhythm  Heart sounds: Normal heart sounds  No murmur  No friction rub  No gallop  Pulmonary:      Effort: Pulmonary effort is normal  No respiratory distress  Breath sounds: Normal breath sounds  No wheezing or rales  Chest:      Chest wall: No tenderness  Abdominal:      General: Bowel sounds are normal  There is no distension  Palpations: Abdomen is soft  There is no mass  Tenderness: There is no abdominal tenderness  There is no guarding or rebound  Musculoskeletal: Normal range of motion  General: No tenderness or deformity  Lymphadenopathy:      Cervical: No cervical adenopathy  Skin:     General: Skin is warm and dry  Coloration: Skin is not pale  Findings: No erythema or rash  Neurological:      Mental Status: He is alert and oriented to person, place, and time  Cranial Nerves: No cranial nerve deficit  Coordination: Coordination normal       Deep Tendon Reflexes: Reflexes are normal and symmetric  Psychiatric:         Behavior: Behavior normal          Thought Content:  Thought content normal          Judgment: Judgment normal

## 2021-05-27 ENCOUNTER — TELEPHONE (OUTPATIENT)
Dept: INTERNAL MEDICINE CLINIC | Facility: CLINIC | Age: 51
End: 2021-05-27

## 2021-05-27 DIAGNOSIS — Z13.1 DIABETES MELLITUS SCREENING: Primary | ICD-10-CM

## 2021-05-27 NOTE — TELEPHONE ENCOUNTER
Patient is asking if we can order his a1c for his insurance he needs it for our wellness program  Can this just be ordered since he was in a few months ago and just had bloodwork  Or would you like him to make another humberto

## 2021-06-02 ENCOUNTER — APPOINTMENT (OUTPATIENT)
Dept: LAB | Age: 51
End: 2021-06-02
Payer: COMMERCIAL

## 2021-06-02 ENCOUNTER — TRANSCRIBE ORDERS (OUTPATIENT)
Dept: ADMINISTRATIVE | Age: 51
End: 2021-06-02

## 2021-06-02 DIAGNOSIS — Z13.1 DIABETES MELLITUS SCREENING: ICD-10-CM

## 2021-06-02 LAB
EST. AVERAGE GLUCOSE BLD GHB EST-MCNC: 120 MG/DL
HBA1C MFR BLD: 5.8 %

## 2021-06-02 PROCEDURE — 83036 HEMOGLOBIN GLYCOSYLATED A1C: CPT

## 2021-06-02 PROCEDURE — 36415 COLL VENOUS BLD VENIPUNCTURE: CPT

## 2021-07-13 ENCOUNTER — TELEPHONE (OUTPATIENT)
Dept: INTERNAL MEDICINE CLINIC | Facility: CLINIC | Age: 51
End: 2021-07-13

## 2021-07-13 DIAGNOSIS — I10 ESSENTIAL HYPERTENSION: Primary | ICD-10-CM

## 2021-07-13 RX ORDER — LISINOPRIL AND HYDROCHLOROTHIAZIDE 20; 12.5 MG/1; MG/1
1 TABLET ORAL DAILY
Qty: 90 TABLET | Refills: 0 | Status: SHIPPED | OUTPATIENT
Start: 2021-07-13 | End: 2021-10-06 | Stop reason: SDUPTHER

## 2021-07-13 NOTE — TELEPHONE ENCOUNTER
Spoke with the patient and explained what Dr Cb Garcia stated and patient will get that medication started when ready to

## 2021-07-13 NOTE — TELEPHONE ENCOUNTER
Please contact patient to inform him that I reviewed his blood pressure log to which I would like to increase his antihypertensive regimen  I would like to add hydrochlorothiazide to lisinopril 20 mg daily  I sent this to the pharmacy, lisinopril-hydrochlorothiazide 20-12 5 mg daily

## 2021-07-13 NOTE — TELEPHONE ENCOUNTER
Patient would like to have a refill on his Lisinopril 20 mg one tablet a day and send to [facility] [address]    Please call patient back on mobile phone

## 2021-09-28 DIAGNOSIS — I10 ESSENTIAL HYPERTENSION: ICD-10-CM

## 2021-09-28 DIAGNOSIS — I47.1 PAROXYSMAL SVT (SUPRAVENTRICULAR TACHYCARDIA) (HCC): ICD-10-CM

## 2021-10-01 ENCOUNTER — OFFICE VISIT (OUTPATIENT)
Dept: CARDIOLOGY CLINIC | Facility: CLINIC | Age: 51
End: 2021-10-01
Payer: COMMERCIAL

## 2021-10-01 VITALS
HEIGHT: 70 IN | HEART RATE: 86 BPM | WEIGHT: 287 LBS | BODY MASS INDEX: 41.09 KG/M2 | SYSTOLIC BLOOD PRESSURE: 122 MMHG | DIASTOLIC BLOOD PRESSURE: 60 MMHG | OXYGEN SATURATION: 98 %

## 2021-10-01 DIAGNOSIS — I47.1 PAROXYSMAL SVT (SUPRAVENTRICULAR TACHYCARDIA) (HCC): Primary | ICD-10-CM

## 2021-10-01 DIAGNOSIS — G47.33 OBSTRUCTIVE SLEEP APNEA: ICD-10-CM

## 2021-10-01 DIAGNOSIS — I49.3 PVC'S (PREMATURE VENTRICULAR CONTRACTIONS): ICD-10-CM

## 2021-10-01 DIAGNOSIS — I10 ESSENTIAL HYPERTENSION: ICD-10-CM

## 2021-10-01 DIAGNOSIS — E66.01 MORBID OBESITY (HCC): ICD-10-CM

## 2021-10-01 PROCEDURE — 99214 OFFICE O/P EST MOD 30 MIN: CPT | Performed by: INTERNAL MEDICINE

## 2021-10-06 DIAGNOSIS — K21.9 LARYNGOPHARYNGEAL REFLUX (LPR): ICD-10-CM

## 2021-10-06 DIAGNOSIS — I47.1 PAROXYSMAL SVT (SUPRAVENTRICULAR TACHYCARDIA) (HCC): ICD-10-CM

## 2021-10-06 DIAGNOSIS — I10 ESSENTIAL HYPERTENSION: ICD-10-CM

## 2021-10-06 RX ORDER — LANSOPRAZOLE 30 MG/1
30 CAPSULE, DELAYED RELEASE ORAL AS NEEDED
Qty: 90 CAPSULE | Refills: 1 | Status: SHIPPED | OUTPATIENT
Start: 2021-10-06 | End: 2022-03-17 | Stop reason: SDUPTHER

## 2021-10-06 RX ORDER — LISINOPRIL AND HYDROCHLOROTHIAZIDE 20; 12.5 MG/1; MG/1
1 TABLET ORAL DAILY
Qty: 90 TABLET | Refills: 1 | Status: SHIPPED | OUTPATIENT
Start: 2021-10-06 | End: 2022-03-17 | Stop reason: SDUPTHER

## 2021-10-06 RX ORDER — DILTIAZEM HYDROCHLORIDE 180 MG/1
180 CAPSULE, COATED, EXTENDED RELEASE ORAL DAILY
Qty: 90 CAPSULE | Refills: 1 | Status: SHIPPED | OUTPATIENT
Start: 2021-10-06 | End: 2022-03-17 | Stop reason: SDUPTHER

## 2021-10-28 ENCOUNTER — OFFICE VISIT (OUTPATIENT)
Dept: INTERNAL MEDICINE CLINIC | Facility: CLINIC | Age: 51
End: 2021-10-28
Payer: COMMERCIAL

## 2021-10-28 VITALS
SYSTOLIC BLOOD PRESSURE: 138 MMHG | HEART RATE: 82 BPM | DIASTOLIC BLOOD PRESSURE: 84 MMHG | OXYGEN SATURATION: 98 % | TEMPERATURE: 99.2 F | HEIGHT: 70 IN | WEIGHT: 290 LBS | BODY MASS INDEX: 41.52 KG/M2 | RESPIRATION RATE: 18 BRPM

## 2021-10-28 DIAGNOSIS — E66.01 MORBID OBESITY (HCC): ICD-10-CM

## 2021-10-28 DIAGNOSIS — I47.1 PAROXYSMAL SVT (SUPRAVENTRICULAR TACHYCARDIA) (HCC): ICD-10-CM

## 2021-10-28 DIAGNOSIS — Z12.11 ENCOUNTER FOR COLORECTAL CANCER SCREENING: ICD-10-CM

## 2021-10-28 DIAGNOSIS — J30.1 SEASONAL ALLERGIC RHINITIS DUE TO POLLEN: ICD-10-CM

## 2021-10-28 DIAGNOSIS — R73.01 IMPAIRED FASTING BLOOD SUGAR: Primary | ICD-10-CM

## 2021-10-28 DIAGNOSIS — Z12.12 ENCOUNTER FOR COLORECTAL CANCER SCREENING: ICD-10-CM

## 2021-10-28 DIAGNOSIS — Z87.442 HISTORY OF NEPHROLITHIASIS: ICD-10-CM

## 2021-10-28 DIAGNOSIS — Z23 NEED FOR SHINGLES VACCINE: ICD-10-CM

## 2021-10-28 DIAGNOSIS — E04.2 MULTINODULAR GOITER: ICD-10-CM

## 2021-10-28 DIAGNOSIS — I10 ESSENTIAL HYPERTENSION: ICD-10-CM

## 2021-10-28 PROCEDURE — 90471 IMMUNIZATION ADMIN: CPT

## 2021-10-28 PROCEDURE — 90750 HZV VACC RECOMBINANT IM: CPT

## 2021-10-28 PROCEDURE — 99214 OFFICE O/P EST MOD 30 MIN: CPT | Performed by: INTERNAL MEDICINE

## 2021-10-29 ENCOUNTER — HOSPITAL ENCOUNTER (OUTPATIENT)
Dept: RADIOLOGY | Age: 51
Discharge: HOME | End: 2021-10-29
Payer: COMMERCIAL

## 2021-10-29 DIAGNOSIS — E04.2 MULTINODULAR GOITER: ICD-10-CM

## 2021-10-29 DIAGNOSIS — Z87.442 HISTORY OF NEPHROLITHIASIS: ICD-10-CM

## 2021-10-29 PROCEDURE — 76770 US EXAM ABDO BACK WALL COMP: CPT

## 2021-10-29 PROCEDURE — 76536 US EXAM OF HEAD AND NECK: CPT

## 2021-12-21 ENCOUNTER — IMMUNIZATIONS (OUTPATIENT)
Dept: FAMILY MEDICINE CLINIC | Facility: HOSPITAL | Age: 51
End: 2021-12-21

## 2021-12-21 DIAGNOSIS — Z23 ENCOUNTER FOR IMMUNIZATION: Primary | ICD-10-CM

## 2021-12-21 PROCEDURE — 0001A COVID-19 PFIZER VACC 0.3 ML: CPT

## 2021-12-21 PROCEDURE — 91300 COVID-19 PFIZER VACC 0.3 ML: CPT

## 2022-03-17 DIAGNOSIS — K21.9 LARYNGOPHARYNGEAL REFLUX (LPR): ICD-10-CM

## 2022-03-17 DIAGNOSIS — I10 ESSENTIAL HYPERTENSION: ICD-10-CM

## 2022-03-17 DIAGNOSIS — I47.1 PAROXYSMAL SVT (SUPRAVENTRICULAR TACHYCARDIA) (HCC): ICD-10-CM

## 2022-03-17 RX ORDER — LISINOPRIL AND HYDROCHLOROTHIAZIDE 20; 12.5 MG/1; MG/1
1 TABLET ORAL DAILY
Qty: 90 TABLET | Refills: 1 | Status: SHIPPED | OUTPATIENT
Start: 2022-03-17

## 2022-03-17 RX ORDER — LANSOPRAZOLE 30 MG/1
30 CAPSULE, DELAYED RELEASE ORAL AS NEEDED
Qty: 90 CAPSULE | Refills: 1 | Status: SHIPPED | OUTPATIENT
Start: 2022-03-17

## 2022-03-17 RX ORDER — DILTIAZEM HYDROCHLORIDE 180 MG/1
180 CAPSULE, COATED, EXTENDED RELEASE ORAL DAILY
Qty: 90 CAPSULE | Refills: 1 | Status: SHIPPED | OUTPATIENT
Start: 2022-03-17

## 2022-03-30 ENCOUNTER — APPOINTMENT (OUTPATIENT)
Dept: LAB | Age: 52
End: 2022-03-30
Payer: COMMERCIAL

## 2022-03-30 DIAGNOSIS — E04.2 MULTINODULAR GOITER: ICD-10-CM

## 2022-03-30 DIAGNOSIS — R73.01 IMPAIRED FASTING BLOOD SUGAR: ICD-10-CM

## 2022-03-30 DIAGNOSIS — Z12.12 ENCOUNTER FOR COLORECTAL CANCER SCREENING: ICD-10-CM

## 2022-03-30 DIAGNOSIS — Z12.11 ENCOUNTER FOR COLORECTAL CANCER SCREENING: ICD-10-CM

## 2022-03-30 DIAGNOSIS — I10 ESSENTIAL HYPERTENSION: ICD-10-CM

## 2022-03-30 LAB
ALBUMIN SERPL BCP-MCNC: 3.6 G/DL (ref 3.5–5)
ALP SERPL-CCNC: 72 U/L (ref 46–116)
ALT SERPL W P-5'-P-CCNC: 98 U/L (ref 12–78)
ANION GAP SERPL CALCULATED.3IONS-SCNC: 6 MMOL/L (ref 4–13)
AST SERPL W P-5'-P-CCNC: 40 U/L (ref 5–45)
BILIRUB SERPL-MCNC: 0.8 MG/DL (ref 0.2–1)
BUN SERPL-MCNC: 17 MG/DL (ref 5–25)
CALCIUM SERPL-MCNC: 9.4 MG/DL (ref 8.3–10.1)
CHLORIDE SERPL-SCNC: 103 MMOL/L (ref 100–108)
CHOLEST SERPL-MCNC: 143 MG/DL
CO2 SERPL-SCNC: 30 MMOL/L (ref 21–32)
CREAT SERPL-MCNC: 1.04 MG/DL (ref 0.6–1.3)
ERYTHROCYTE [DISTWIDTH] IN BLOOD BY AUTOMATED COUNT: 13.3 % (ref 11.6–15.1)
EST. AVERAGE GLUCOSE BLD GHB EST-MCNC: 120 MG/DL
GFR SERPL CREATININE-BSD FRML MDRD: 82 ML/MIN/1.73SQ M
GLUCOSE P FAST SERPL-MCNC: 109 MG/DL (ref 65–99)
HBA1C MFR BLD: 5.8 %
HCT VFR BLD AUTO: 45.1 % (ref 36.5–49.3)
HDLC SERPL-MCNC: 41 MG/DL
HEMOCCULT STL QL IA: NEGATIVE
HGB BLD-MCNC: 15.5 G/DL (ref 12–17)
LDLC SERPL CALC-MCNC: 90 MG/DL (ref 0–100)
MCH RBC QN AUTO: 29.9 PG (ref 26.8–34.3)
MCHC RBC AUTO-ENTMCNC: 34.4 G/DL (ref 31.4–37.4)
MCV RBC AUTO: 87 FL (ref 82–98)
NONHDLC SERPL-MCNC: 102 MG/DL
PLATELET # BLD AUTO: 284 THOUSANDS/UL (ref 149–390)
PMV BLD AUTO: 9 FL (ref 8.9–12.7)
POTASSIUM SERPL-SCNC: 4.5 MMOL/L (ref 3.5–5.3)
PROT SERPL-MCNC: 6.9 G/DL (ref 6.4–8.2)
RBC # BLD AUTO: 5.19 MILLION/UL (ref 3.88–5.62)
SODIUM SERPL-SCNC: 139 MMOL/L (ref 136–145)
TRIGL SERPL-MCNC: 62 MG/DL
TSH SERPL DL<=0.05 MIU/L-ACNC: 1.36 UIU/ML (ref 0.36–3.74)
WBC # BLD AUTO: 8.03 THOUSAND/UL (ref 4.31–10.16)

## 2022-03-30 PROCEDURE — 80053 COMPREHEN METABOLIC PANEL: CPT

## 2022-03-30 PROCEDURE — 80061 LIPID PANEL: CPT

## 2022-03-30 PROCEDURE — 36415 COLL VENOUS BLD VENIPUNCTURE: CPT

## 2022-03-30 PROCEDURE — 83036 HEMOGLOBIN GLYCOSYLATED A1C: CPT

## 2022-03-30 PROCEDURE — 84443 ASSAY THYROID STIM HORMONE: CPT

## 2022-03-30 PROCEDURE — G0328 FECAL BLOOD SCRN IMMUNOASSAY: HCPCS

## 2022-03-30 PROCEDURE — 85027 COMPLETE CBC AUTOMATED: CPT

## 2022-04-07 ENCOUNTER — OFFICE VISIT (OUTPATIENT)
Dept: INTERNAL MEDICINE CLINIC | Facility: OTHER | Age: 52
End: 2022-04-07
Payer: COMMERCIAL

## 2022-04-07 VITALS
RESPIRATION RATE: 20 BRPM | WEIGHT: 289.4 LBS | DIASTOLIC BLOOD PRESSURE: 78 MMHG | TEMPERATURE: 98.8 F | BODY MASS INDEX: 41.43 KG/M2 | SYSTOLIC BLOOD PRESSURE: 146 MMHG | HEART RATE: 82 BPM | OXYGEN SATURATION: 95 % | HEIGHT: 70 IN

## 2022-04-07 DIAGNOSIS — I10 ESSENTIAL HYPERTENSION: ICD-10-CM

## 2022-04-07 DIAGNOSIS — Z11.59 NEED FOR HEPATITIS C SCREENING TEST: ICD-10-CM

## 2022-04-07 DIAGNOSIS — I47.1 PAROXYSMAL SVT (SUPRAVENTRICULAR TACHYCARDIA) (HCC): ICD-10-CM

## 2022-04-07 DIAGNOSIS — E66.01 MORBID OBESITY (HCC): ICD-10-CM

## 2022-04-07 DIAGNOSIS — E04.2 MULTINODULAR GOITER: ICD-10-CM

## 2022-04-07 DIAGNOSIS — Z23 NEED FOR SHINGLES VACCINE: ICD-10-CM

## 2022-04-07 DIAGNOSIS — R73.01 IMPAIRED FASTING BLOOD SUGAR: ICD-10-CM

## 2022-04-07 DIAGNOSIS — K21.9 LARYNGOPHARYNGEAL REFLUX (LPR): Primary | ICD-10-CM

## 2022-04-07 PROCEDURE — 90750 HZV VACC RECOMBINANT IM: CPT

## 2022-04-07 PROCEDURE — 90471 IMMUNIZATION ADMIN: CPT

## 2022-04-07 PROCEDURE — 99214 OFFICE O/P EST MOD 30 MIN: CPT | Performed by: INTERNAL MEDICINE

## 2022-04-07 NOTE — PROGRESS NOTES
Assessment/Plan:    Laryngopharyngeal reflux (LPR)  Stable, controlled  Continue Prevacid 30 mg daily as needed  Impaired fasting blood sugar  Stable  Discussed diet and exercise  Multinodular goiter  TSH within acceptable range  Asymptomatic  Continue follow-up with ENT  Essential hypertension  Stable  Continue diltiazem 180 mg daily and lisinopril-hydrochlorothiazide 20-12 5 mg daily  Paroxysmal SVT (supraventricular tachycardia) (HCC)  Asymptomatic  Continue diltiazem  Diagnoses and all orders for this visit:    Laryngopharyngeal reflux (LPR)    Impaired fasting blood sugar  -     Comprehensive metabolic panel; Future  -     Hemoglobin A1C; Future    Multinodular goiter  -     Comprehensive metabolic panel; Future    Essential hypertension  -     Comprehensive metabolic panel; Future    Morbid obesity ([address] )    Need for shingles vaccine  -     Zoster Vaccine Recombinant IM    Need for hepatitis C screening test  -     Hepatitis C antibody; Future    Paroxysmal SVT (supraventricular tachycardia) (HCC)          BMI Counseling: Body mass index is 41 52 kg/m²  The BMI is above normal  Nutrition recommendations include decreasing portion sizes, encouraging healthy choices of fruits and vegetables, decreasing fast food intake, consuming healthier snacks, limiting drinks that contain sugar, moderation in carbohydrate intake, increasing intake of lean protein, reducing intake of saturated and trans fat and reducing intake of cholesterol  Exercise recommendations include moderate physical activity 150 minutes/week and exercising 3-5 times per week  No pharmacotherapy was ordered  Patient referred to PCP  Rationale for BMI follow-up plan is due to patient being overweight or obese  Depression Screening and Follow-up Plan: Clincally patient does not have depression  No treatment is required  Patient advised to follow-up with PCP for further management              Subjective:      Patient ID: Kwesi Hanson is a 45 y o  male  Chief Complaint   Patient presents with    Follow-up     5 month, labs done 3/30 /22    health maintenance     hep c, annual exam    Injections     2nd shingles shot due       39-year-old male seen today for follow-up of chronic conditions  Laboratory studies reviewed today, A1c remains stable at 5 8%  Lipid panel and CBC are within acceptable range  CMP shows mild elevation in ALT at 98  TSH is within acceptable range  He has been seen by ENT who recommended thyroidectomy due to multinodular goiter however he chose to defer at this time  He has been compliant with medication regimen  He has no active complaints or concerns at this time  He monitors his blood pressure at home intermittently, reports average readings of 130/80  Hypertension  This is a chronic problem  The current episode started more than 1 year ago  The problem is unchanged  The problem is controlled  Pertinent negatives include no chest pain, headaches, palpitations or shortness of breath  Past treatments include calcium channel blockers, diuretics and ACE inhibitors  The current treatment provides moderate improvement  There are no compliance problems  Heartburn  He reports no abdominal pain, no chest pain, no choking, no coughing, no nausea, no sore throat or no wheezing  This is a chronic problem  The current episode started more than 1 year ago  The problem occurs rarely  The problem has been unchanged  The symptoms are aggravated by certain foods  Pertinent negatives include no fatigue  He has tried a PPI for the symptoms  The treatment provided moderate relief         The following portions of the patient's history were reviewed and updated as appropriate: allergies, current medications, past family history, past medical history, past social history, past surgical history and problem list     Review of Systems   Constitutional: Negative for activity change, appetite change, chills, diaphoresis, fatigue and fever  HENT: Negative for congestion, postnasal drip, rhinorrhea, sinus pressure, sinus pain, sneezing and sore throat  Eyes: Negative for visual disturbance  Respiratory: Negative for apnea, cough, choking, chest tightness, shortness of breath and wheezing  Cardiovascular: Negative for chest pain, palpitations and leg swelling  Gastrointestinal: Negative for abdominal distention, abdominal pain, anal bleeding, blood in stool, constipation, diarrhea, nausea and vomiting  Endocrine: Negative for cold intolerance and heat intolerance  Genitourinary: Negative for difficulty urinating, dysuria and hematuria  Musculoskeletal: Negative  Skin: Negative  Neurological: Negative for dizziness, weakness, light-headedness, numbness and headaches  Hematological: Negative for adenopathy  Psychiatric/Behavioral: Negative for agitation, sleep disturbance and suicidal ideas  All other systems reviewed and are negative          Past Medical History:   Diagnosis Date    Herpes zoster without complication 2/1/0750    Hypertension     Irregular heart beat     Kidney stone     Multinodular goiter 12/12/2018    Sleep apnea          Current Outpatient Medications:     diltiazem (CARDIZEM CD) 180 mg 24 hr capsule, Take 1 capsule (180 mg total) by mouth daily, Disp: 90 capsule, Rfl: 1    lansoprazole (PREVACID) 30 mg capsule, Take 1 capsule (30 mg total) by mouth as needed (LPR), Disp: 90 capsule, Rfl: 1    lisinopril-hydrochlorothiazide (PRINZIDE,ZESTORETIC) 20-12 5 MG per tablet, Take 1 tablet by mouth daily, Disp: 90 tablet, Rfl: 1    No Known Allergies    Social History   Past Surgical History:   Procedure Laterality Date    US GUIDED MSK PROCEDURE  4/23/2019    US GUIDED THYROID BIOPSY  12/28/2018     Family History   Problem Relation Age of Onset    Hyperlipidemia Mother     Heart block Mother     Stroke Mother     Kidney nephrosis Father     Stroke Maternal Grandmother     No Known Problems Maternal Grandfather     Cancer Paternal Grandmother     Heart disease Paternal Grandfather        Objective:  /78 (BP Location: Left arm, Patient Position: Sitting, Cuff Size: Large)   Pulse 82   Temp 98 8 °F (37 1 °C) (Temporal)   Resp 20   Ht 5' 10" (1 778 m)   Wt 131 kg (289 lb 6 4 oz)   SpO2 95%   BMI 41 52 kg/m²     Recent Results (from the past 1344 hour(s))   CBC    Collection Time: 03/30/22  7:28 AM   Result Value Ref Range    WBC 8 03 4 31 - 10 16 Thousand/uL    RBC 5 19 3 88 - 5 62 Million/uL    Hemoglobin 15 5 12 0 - 17 0 g/dL    Hematocrit 45 1 36 5 - 49 3 %    MCV 87 82 - 98 fL    MCH 29 9 26 8 - 34 3 pg    MCHC 34 4 31 4 - 37 4 g/dL    RDW 13 3 11 6 - 15 1 %    Platelets 091 720 - 038 Thousands/uL    MPV 9 0 8 9 - 12 7 fL   Comprehensive metabolic panel    Collection Time: 03/30/22  7:28 AM   Result Value Ref Range    Sodium 139 136 - 145 mmol/L    Potassium 4 5 3 5 - 5 3 mmol/L    Chloride 103 100 - 108 mmol/L    CO2 30 21 - 32 mmol/L    ANION GAP 6 4 - 13 mmol/L    BUN 17 5 - 25 mg/dL    Creatinine 1 04 0 60 - 1 30 mg/dL    Glucose, Fasting 109 (H) 65 - 99 mg/dL    Calcium 9 4 8 3 - 10 1 mg/dL    AST 40 5 - 45 U/L    ALT 98 (H) 12 - 78 U/L    Alkaline Phosphatase 72 46 - 116 U/L    Total Protein 6 9 6 4 - 8 2 g/dL    Albumin 3 6 3 5 - 5 0 g/dL    Total Bilirubin 0 80 0 20 - 1 00 mg/dL    eGFR 82 ml/min/1 73sq m   Lipid panel    Collection Time: 03/30/22  7:28 AM   Result Value Ref Range    Cholesterol 143 See Comment mg/dL    Triglycerides 62 See Comment mg/dL    HDL, Direct 41 >=40 mg/dL    LDL Calculated 90 0 - 100 mg/dL    Non-HDL-Chol (CHOL-HDL) 102 mg/dl   Hemoglobin A1C    Collection Time: 03/30/22  7:28 AM   Result Value Ref Range    Hemoglobin A1C 5 8 (H) Normal 3 8-5 6%; PreDiabetic 5 7-6 4%;  Diabetic >=6 5%; Glycemic control for adults with diabetes <7 0% %     mg/dl   TSH, 3rd generation with Free T4 reflex    Collection Time: 03/30/22  7:28 AM   Result Value Ref Range    TSH 3RD GENERATON 1 360 0 358 - 3 740 uIU/mL   Occult Blood, Fecal Immunochemical    Collection Time: 03/30/22  7:28 AM   Result Value Ref Range    OCCULT BLD, FECAL IMMUNOLOGICAL Negative Negative            Physical Exam  Vitals and nursing note reviewed  Constitutional:       General: He is not in acute distress  Appearance: He is well-developed  He is not diaphoretic  HENT:      Head: Normocephalic and atraumatic  Eyes:      General: No scleral icterus  Right eye: No discharge  Left eye: No discharge  Conjunctiva/sclera: Conjunctivae normal       Pupils: Pupils are equal, round, and reactive to light  Neck:      Thyroid: No thyromegaly  Vascular: No JVD  Cardiovascular:      Rate and Rhythm: Normal rate and regular rhythm  Heart sounds: Normal heart sounds  No murmur heard  No friction rub  No gallop  Pulmonary:      Effort: Pulmonary effort is normal  No respiratory distress  Breath sounds: Normal breath sounds  No wheezing or rales  Chest:      Chest wall: No tenderness  Abdominal:      General: Bowel sounds are normal  There is no distension  Palpations: Abdomen is soft  There is no mass  Tenderness: There is no abdominal tenderness  There is no guarding or rebound  Musculoskeletal:         General: No tenderness or deformity  Normal range of motion  Cervical back: Normal range of motion and neck supple  Lymphadenopathy:      Cervical: No cervical adenopathy  Skin:     General: Skin is warm and dry  Coloration: Skin is not pale  Findings: No erythema or rash  Neurological:      Mental Status: He is alert and oriented to person, place, and time  Cranial Nerves: No cranial nerve deficit  Coordination: Coordination normal       Deep Tendon Reflexes: Reflexes are normal and symmetric  Psychiatric:         Behavior: Behavior normal          Thought Content:  Thought content normal          Judgment: Judgment normal

## 2022-09-23 DIAGNOSIS — K21.9 LARYNGOPHARYNGEAL REFLUX (LPR): ICD-10-CM

## 2022-09-23 DIAGNOSIS — I10 ESSENTIAL HYPERTENSION: ICD-10-CM

## 2022-09-23 DIAGNOSIS — I47.1 PAROXYSMAL SVT (SUPRAVENTRICULAR TACHYCARDIA) (HCC): ICD-10-CM

## 2022-09-23 RX ORDER — DILTIAZEM HYDROCHLORIDE 180 MG/1
180 CAPSULE, COATED, EXTENDED RELEASE ORAL DAILY
Qty: 90 CAPSULE | Refills: 1 | Status: SHIPPED | OUTPATIENT
Start: 2022-09-23

## 2022-09-23 RX ORDER — LANSOPRAZOLE 30 MG/1
30 CAPSULE, DELAYED RELEASE ORAL AS NEEDED
Qty: 90 CAPSULE | Refills: 1 | Status: SHIPPED | OUTPATIENT
Start: 2022-09-23

## 2022-09-23 RX ORDER — LISINOPRIL AND HYDROCHLOROTHIAZIDE 20; 12.5 MG/1; MG/1
1 TABLET ORAL DAILY
Qty: 90 TABLET | Refills: 1 | Status: SHIPPED | OUTPATIENT
Start: 2022-09-23 | End: 2022-10-20

## 2022-10-12 ENCOUNTER — APPOINTMENT (OUTPATIENT)
Dept: LAB | Age: 52
End: 2022-10-12
Payer: COMMERCIAL

## 2022-10-12 DIAGNOSIS — E04.2 MULTINODULAR GOITER: ICD-10-CM

## 2022-10-12 DIAGNOSIS — I10 ESSENTIAL HYPERTENSION: ICD-10-CM

## 2022-10-12 DIAGNOSIS — R73.01 IMPAIRED FASTING BLOOD SUGAR: ICD-10-CM

## 2022-10-12 DIAGNOSIS — Z11.59 NEED FOR HEPATITIS C SCREENING TEST: ICD-10-CM

## 2022-10-12 LAB
ALBUMIN SERPL BCP-MCNC: 3.9 G/DL (ref 3.5–5)
ALP SERPL-CCNC: 78 U/L (ref 46–116)
ALT SERPL W P-5'-P-CCNC: 99 U/L (ref 12–78)
ANION GAP SERPL CALCULATED.3IONS-SCNC: 6 MMOL/L (ref 4–13)
AST SERPL W P-5'-P-CCNC: 34 U/L (ref 5–45)
BILIRUB SERPL-MCNC: 0.83 MG/DL (ref 0.2–1)
BUN SERPL-MCNC: 17 MG/DL (ref 5–25)
CALCIUM SERPL-MCNC: 9.5 MG/DL (ref 8.3–10.1)
CHLORIDE SERPL-SCNC: 104 MMOL/L (ref 96–108)
CO2 SERPL-SCNC: 28 MMOL/L (ref 21–32)
CREAT SERPL-MCNC: 1.01 MG/DL (ref 0.6–1.3)
EST. AVERAGE GLUCOSE BLD GHB EST-MCNC: 123 MG/DL
GFR SERPL CREATININE-BSD FRML MDRD: 84 ML/MIN/1.73SQ M
GLUCOSE P FAST SERPL-MCNC: 109 MG/DL (ref 65–99)
HBA1C MFR BLD: 5.9 %
HCV AB SER QL: NORMAL
POTASSIUM SERPL-SCNC: 3.9 MMOL/L (ref 3.5–5.3)
PROT SERPL-MCNC: 7.3 G/DL (ref 6.4–8.4)
SODIUM SERPL-SCNC: 138 MMOL/L (ref 135–147)

## 2022-10-12 PROCEDURE — 83036 HEMOGLOBIN GLYCOSYLATED A1C: CPT

## 2022-10-12 PROCEDURE — 86803 HEPATITIS C AB TEST: CPT

## 2022-10-12 PROCEDURE — 80053 COMPREHEN METABOLIC PANEL: CPT

## 2022-10-12 PROCEDURE — 36415 COLL VENOUS BLD VENIPUNCTURE: CPT

## 2022-10-20 ENCOUNTER — OFFICE VISIT (OUTPATIENT)
Dept: INTERNAL MEDICINE CLINIC | Facility: OTHER | Age: 52
End: 2022-10-20
Payer: COMMERCIAL

## 2022-10-20 VITALS
HEART RATE: 77 BPM | TEMPERATURE: 98.7 F | WEIGHT: 287 LBS | SYSTOLIC BLOOD PRESSURE: 152 MMHG | DIASTOLIC BLOOD PRESSURE: 84 MMHG | BODY MASS INDEX: 41.09 KG/M2 | OXYGEN SATURATION: 96 % | HEIGHT: 70 IN

## 2022-10-20 DIAGNOSIS — I47.1 PAROXYSMAL SVT (SUPRAVENTRICULAR TACHYCARDIA) (HCC): ICD-10-CM

## 2022-10-20 DIAGNOSIS — I10 ESSENTIAL HYPERTENSION: ICD-10-CM

## 2022-10-20 DIAGNOSIS — R73.01 IMPAIRED FASTING BLOOD SUGAR: ICD-10-CM

## 2022-10-20 DIAGNOSIS — Z23 NEEDS FLU SHOT: ICD-10-CM

## 2022-10-20 DIAGNOSIS — E04.2 MULTINODULAR GOITER: Primary | ICD-10-CM

## 2022-10-20 DIAGNOSIS — R74.8 ELEVATED LIVER ENZYMES: ICD-10-CM

## 2022-10-20 DIAGNOSIS — E66.01 MORBID OBESITY (HCC): ICD-10-CM

## 2022-10-20 PROCEDURE — 99214 OFFICE O/P EST MOD 30 MIN: CPT | Performed by: INTERNAL MEDICINE

## 2022-10-20 PROCEDURE — 90682 RIV4 VACC RECOMBINANT DNA IM: CPT

## 2022-10-20 PROCEDURE — 90471 IMMUNIZATION ADMIN: CPT

## 2022-10-20 RX ORDER — HYDROCHLOROTHIAZIDE 25 MG/1
12.5 TABLET ORAL DAILY
Qty: 45 TABLET | Refills: 0 | Status: SHIPPED | OUTPATIENT
Start: 2022-10-20

## 2022-10-20 RX ORDER — LISINOPRIL AND HYDROCHLOROTHIAZIDE 25; 20 MG/1; MG/1
1 TABLET ORAL DAILY
Qty: 90 TABLET | Refills: 1 | Status: SHIPPED | OUTPATIENT
Start: 2022-10-20 | End: 2022-10-20

## 2022-10-20 RX ORDER — LISINOPRIL AND HYDROCHLOROTHIAZIDE 25; 20 MG/1; MG/1
1 TABLET ORAL DAILY
Qty: 90 TABLET | Refills: 1
Start: 2022-10-20

## 2022-10-20 RX ORDER — FLUTICASONE PROPIONATE 50 MCG
1 SPRAY, SUSPENSION (ML) NASAL DAILY
COMMUNITY

## 2022-10-20 NOTE — ASSESSMENT & PLAN NOTE
Uncontrolled  Will increase lisinopril-hydrochlorothiazide to 20-25 mg daily  Continue diltiazem 180 mg daily

## 2022-10-20 NOTE — PROGRESS NOTES
Assessment/Plan:    Multinodular goiter  Continue follow-up with ENT  He is requesting a referral to endocrinology for further evaluation  Impaired fasting blood sugar  Discussed diet and exercise  Essential hypertension  Uncontrolled  Will increase lisinopril-hydrochlorothiazide to 20-25 mg daily  Continue diltiazem 180 mg daily  Paroxysmal SVT (supraventricular tachycardia) (HCC)  Continue diltiazem  Elevated liver enzymes  Will order an abdominal ultrasound for further evaluation  Diagnoses and all orders for this visit:    Multinodular goiter  -     Ambulatory Referral to Endocrinology; Future    Needs flu shot  -     influenza vaccine, quadrivalent, recombinant, PF, 0 5 mL, for patients 18 yr+ (FLUBLOK)    Elevated liver enzymes  -     US abdomen complete; Future    Essential hypertension  -     Discontinue: lisinopril-hydrochlorothiazide (PRINZIDE,ZESTORETIC) 20-25 MG per tablet; Take 1 tablet by mouth daily  -     hydrochlorothiazide (HYDRODIURIL) 25 mg tablet; Take 0 5 tablets (12 5 mg total) by mouth daily  -     lisinopril-hydrochlorothiazide (PRINZIDE,ZESTORETIC) 20-25 MG per tablet; Take 1 tablet by mouth daily    Impaired fasting blood sugar    Paroxysmal SVT (supraventricular tachycardia) (HCC)    Morbid obesity (HCC)    Other orders  -     fluticasone (FLONASE) 50 mcg/act nasal spray; 1 spray into each nostril daily PRN          BMI Counseling: Body mass index is 41 18 kg/m²  The BMI is above normal  Nutrition recommendations include decreasing portion sizes, encouraging healthy choices of fruits and vegetables, decreasing fast food intake, consuming healthier snacks, limiting drinks that contain sugar, moderation in carbohydrate intake, increasing intake of lean protein, reducing intake of saturated and trans fat and reducing intake of cholesterol  Exercise recommendations include moderate physical activity 150 minutes/week and exercising 3-5 times per week   No pharmacotherapy was ordered  Patient referred to PCP  Rationale for BMI follow-up plan is due to patient being overweight or obese  Depression Screening and Follow-up Plan: Patient's depression screening was positive with a PHQ-2 score of 3  Their PHQ-9 score was 5  Clincally patient does not have depression  No treatment is required  Patient advised to follow-up with PCP for further management  Subjective:      Patient ID: Sorin López is a 47 y o  male  Chief Complaint   Patient presents with   • Follow-up     6 month follow up   Labs done on 10/12/2022  Discuss referral to endocrinologist to discuss thyroid issues        53-year-old male seen today for follow-up of chronic conditions  Laboratory studies reviewed today, ALT elevated at 99  Hemoglobin A1c stable at 5 9%  Hepatitis-C antibody is negative  He has been compliant with medication regimen  He has been monitoring his blood pressure at home, in reviewing home blood pressure log his average blood pressure is 140-150 over 80s  He has establish care with ENT regarding thyroid goiter to which recommendations are surveillance for now  He is requesting referral to Endocrinology  Otherwise, he has no active complaints concerns at this time  Hypertension  This is a chronic problem  The current episode started more than 1 year ago  The problem is unchanged  The problem is controlled  Pertinent negatives include no chest pain, headaches, palpitations or shortness of breath  Past treatments include diuretics, calcium channel blockers and ACE inhibitors  The current treatment provides mild improvement  Compliance problems include diet  Identifiable causes of hypertension include a thyroid problem  Thyroid Problem  Presents for follow-up visit  Patient reports no cold intolerance, constipation, diaphoresis, diarrhea, fatigue, heat intolerance or palpitations  The symptoms have been stable         The following portions of the patient's history were reviewed and updated as appropriate: allergies, current medications, past family history, past medical history, past social history, past surgical history and problem list     Review of Systems   Constitutional: Negative for activity change, appetite change, chills, diaphoresis, fatigue and fever  HENT: Negative for congestion, postnasal drip, rhinorrhea, sinus pressure, sinus pain, sneezing and sore throat  Eyes: Negative for visual disturbance  Respiratory: Negative for apnea, cough, choking, chest tightness, shortness of breath and wheezing  Cardiovascular: Negative for chest pain, palpitations and leg swelling  Gastrointestinal: Negative for abdominal distention, abdominal pain, anal bleeding, blood in stool, constipation, diarrhea, nausea and vomiting  Endocrine: Negative for cold intolerance and heat intolerance  Genitourinary: Negative for difficulty urinating, dysuria and hematuria  Musculoskeletal: Negative  Skin: Negative  Neurological: Negative for dizziness, weakness, light-headedness, numbness and headaches  Hematological: Negative for adenopathy  Psychiatric/Behavioral: Negative for agitation, sleep disturbance and suicidal ideas  All other systems reviewed and are negative          Past Medical History:   Diagnosis Date   • Herpes zoster without complication 7/7/4368   • Hypertension    • Irregular heart beat    • Kidney stone    • Multinodular goiter 12/12/2018   • Sleep apnea          Current Outpatient Medications:   •  diltiazem (CARDIZEM CD) 180 mg 24 hr capsule, Take 1 capsule (180 mg total) by mouth daily, Disp: 90 capsule, Rfl: 1  •  fluticasone (FLONASE) 50 mcg/act nasal spray, 1 spray into each nostril daily PRN, Disp: , Rfl:   •  hydrochlorothiazide (HYDRODIURIL) 25 mg tablet, Take 0 5 tablets (12 5 mg total) by mouth daily, Disp: 45 tablet, Rfl: 0  •  lansoprazole (PREVACID) 30 mg capsule, Take 1 capsule (30 mg total) by mouth as needed (LPR), Disp: 90 capsule, Rfl: 1  •  lisinopril-hydrochlorothiazide (PRINZIDE,ZESTORETIC) 20-25 MG per tablet, Take 1 tablet by mouth daily, Disp: 90 tablet, Rfl: 1    No Known Allergies    Social History   Past Surgical History:   Procedure Laterality Date   • US GUIDED MSK PROCEDURE  4/23/2019   • US GUIDED THYROID BIOPSY  12/28/2018     Family History   Problem Relation Age of Onset   • Hyperlipidemia Mother    • Heart block Mother    • Stroke Mother    • Kidney nephrosis Father    • Stroke Maternal Grandmother    • No Known Problems Maternal Grandfather    • Cancer Paternal Grandmother    • Heart disease Paternal Grandfather        Objective:  /84 (BP Location: Left arm, Patient Position: Sitting, Cuff Size: Large)   Pulse 77   Temp 98 7 °F (37 1 °C) (Temporal)   Ht 5' 10" (1 778 m)   Wt 130 kg (287 lb)   SpO2 96%   BMI 41 18 kg/m²     Recent Results (from the past 1344 hour(s))   Comprehensive metabolic panel    Collection Time: 10/12/22  7:01 AM   Result Value Ref Range    Sodium 138 135 - 147 mmol/L    Potassium 3 9 3 5 - 5 3 mmol/L    Chloride 104 96 - 108 mmol/L    CO2 28 21 - 32 mmol/L    ANION GAP 6 4 - 13 mmol/L    BUN 17 5 - 25 mg/dL    Creatinine 1 01 0 60 - 1 30 mg/dL    Glucose, Fasting 109 (H) 65 - 99 mg/dL    Calcium 9 5 8 3 - 10 1 mg/dL    AST 34 5 - 45 U/L    ALT 99 (H) 12 - 78 U/L    Alkaline Phosphatase 78 46 - 116 U/L    Total Protein 7 3 6 4 - 8 4 g/dL    Albumin 3 9 3 5 - 5 0 g/dL    Total Bilirubin 0 83 0 20 - 1 00 mg/dL    eGFR 84 ml/min/1 73sq m   Hemoglobin A1C    Collection Time: 10/12/22  7:01 AM   Result Value Ref Range    Hemoglobin A1C 5 9 (H) Normal 3 8-5 6%; PreDiabetic 5 7-6 4%; Diabetic >=6 5%; Glycemic control for adults with diabetes <7 0% %     mg/dl   Hepatitis C antibody    Collection Time: 10/12/22  7:01 AM   Result Value Ref Range    Hepatitis C Ab Non-reactive Non-reactive            Physical Exam  Vitals and nursing note reviewed     Constitutional: General: He is not in acute distress  Appearance: He is well-developed  He is not diaphoretic  HENT:      Head: Normocephalic and atraumatic  Eyes:      General: No scleral icterus  Right eye: No discharge  Left eye: No discharge  Conjunctiva/sclera: Conjunctivae normal       Pupils: Pupils are equal, round, and reactive to light  Neck:      Thyroid: No thyromegaly  Vascular: No JVD  Cardiovascular:      Rate and Rhythm: Normal rate and regular rhythm  Heart sounds: Normal heart sounds  No murmur heard  No friction rub  No gallop  Pulmonary:      Effort: Pulmonary effort is normal  No respiratory distress  Breath sounds: Normal breath sounds  No wheezing or rales  Chest:      Chest wall: No tenderness  Abdominal:      General: Bowel sounds are normal  There is no distension  Palpations: Abdomen is soft  There is no mass  Tenderness: There is no abdominal tenderness  There is no guarding or rebound  Musculoskeletal:         General: No tenderness or deformity  Normal range of motion  Cervical back: Normal range of motion and neck supple  Lymphadenopathy:      Cervical: No cervical adenopathy  Skin:     General: Skin is warm and dry  Coloration: Skin is not pale  Findings: No erythema or rash  Neurological:      Mental Status: He is alert and oriented to person, place, and time  Cranial Nerves: No cranial nerve deficit  Coordination: Coordination normal       Deep Tendon Reflexes: Reflexes are normal and symmetric  Psychiatric:         Behavior: Behavior normal          Thought Content:  Thought content normal          Judgment: Judgment normal

## 2022-11-13 ENCOUNTER — HOSPITAL ENCOUNTER (OUTPATIENT)
Dept: RADIOLOGY | Facility: HOSPITAL | Age: 52
Discharge: HOME | End: 2022-11-13
Attending: OTOLARYNGOLOGY

## 2022-11-13 ENCOUNTER — HOSPITAL ENCOUNTER (OUTPATIENT)
Dept: RADIOLOGY | Facility: HOSPITAL | Age: 52
Discharge: HOME | End: 2022-11-13
Attending: INTERNAL MEDICINE

## 2022-11-13 DIAGNOSIS — E04.2 MULTINODULAR GOITER: ICD-10-CM

## 2022-11-13 DIAGNOSIS — R74.8 ELEVATED LIVER ENZYMES: ICD-10-CM

## 2022-12-05 ENCOUNTER — CONSULT (OUTPATIENT)
Dept: ENDOCRINOLOGY | Facility: CLINIC | Age: 52
End: 2022-12-05

## 2022-12-05 ENCOUNTER — LAB (OUTPATIENT)
Dept: LAB | Facility: CLINIC | Age: 52
End: 2022-12-05

## 2022-12-05 VITALS
HEIGHT: 70 IN | DIASTOLIC BLOOD PRESSURE: 78 MMHG | OXYGEN SATURATION: 96 % | HEART RATE: 82 BPM | BODY MASS INDEX: 41.29 KG/M2 | WEIGHT: 288.4 LBS | SYSTOLIC BLOOD PRESSURE: 150 MMHG

## 2022-12-05 DIAGNOSIS — E55.9 VITAMIN D DEFICIENCY: ICD-10-CM

## 2022-12-05 DIAGNOSIS — E61.1 IRON DEFICIENCY: ICD-10-CM

## 2022-12-05 DIAGNOSIS — E66.01 MORBID OBESITY (HCC): ICD-10-CM

## 2022-12-05 DIAGNOSIS — E04.2 MULTINODULAR GOITER: Primary | ICD-10-CM

## 2022-12-05 LAB
25(OH)D3 SERPL-MCNC: 35.8 NG/ML (ref 30–100)
FERRITIN SERPL-MCNC: 201 NG/ML (ref 8–388)
IRON SATN MFR SERPL: 46 % (ref 20–50)
IRON SERPL-MCNC: 129 UG/DL (ref 65–175)
TIBC SERPL-MCNC: 280 UG/DL (ref 250–450)

## 2022-12-05 RX ORDER — MULTIVIT-MIN/IRON FUM/FOLIC AC 7.5 MG-4
1 TABLET ORAL DAILY
COMMUNITY

## 2022-12-05 NOTE — LETTER
December 5, 2022     Jena Scruggs, [address] [facility]    Patient: Martita Kitchen   YOB: 1970   Date of Visit: 12/5/2022       Dear Dr Niya Romo:    Thank you for referring Martita Kitchen to me for evaluation  Below are my notes for this consultation  If you have questions, please do not hesitate to call me  I look forward to following your patient along with you  Sincerely,        Rajinder Vera MD        CC: MD Rajinder Eid MD  12/5/2022  9:08 AM  Sign when Signing Visit   Martita Kitchen 47 y o  male MRN: [id number]    Encounter: [id number]      Assessment/Plan     Assessment: This is a 47y o -year-old male with thyroid nodules, vitamin-D deficiency, iron deficiency and morbid obesity  Plan:  1  Thyroid nodules-his right midpole thyroid nodule is above the criteria for recommending surgery  The right midpole nodule is almost 5 cm and has grown more than 20% at least in one direction  The CATR[place] guidelines recommend that he should undergo surgical intervention for any nodule greater than 4 cm  This is what I have recommended  He would like to think about this and decide within the next 4 to 6 weeks if he would want to pursue surgical intervention  I did advise him that either Dr Jemima Escobar or Dr Pervis Primrose would do the surgery  Additionally, we discussed that the left thyroid nodule would need ultrasound follow-up if he decided to only have a right hemithyroidectomy  If on surgical pathology, there was evidence of thyroid cancer he would need a completion thyroidectomy as well depending on what the cancer was  He did state that he is trying to avoid surgery and if there are any other options  I reiterated what the recommendations are but he gets to decide what he would like to do  He is going to see me as needed and will follow with Dr Johnny Tovar  2  25 hydroxy vitamin-D level will be checked for vitamin-D deficiency        3  Iron deficiency-since he is on a proton pump inhibitor, he could develop iron deficiency  Therefore, check iron, TIBC, ferritin and % saturation  4  Morbid obesity appears to be stable  CC:   Thyroid nodules    History of Present Illness     HPI:  69-year-old man with multiple thyroid nodules discovered in 2018 presents for consultation  In 2018, he was noted to have for thyroid nodules one of them in the right mid pole was 4 cm  He underwent biopsy of the four nodules which were reportedly benign  He had a repeat ultrasound twice which showed increase in the 4 cm nodule and is now close to 5 cm  He denies any difficulty swallowing or breathing  There is no family history of thyroid cancer or personal history of radiation exposure  Review of Systems   Constitutional: Negative for chills and fever  HENT: Negative for trouble swallowing  Respiratory: Negative for shortness of breath  Cardiovascular: Negative for chest pain  Gastrointestinal: Negative for constipation, diarrhea, nausea and vomiting  All other systems reviewed and are negative        Historical Information   Past Medical History:   Diagnosis Date   • Herpes zoster without complication 5/6/1812   • Hypertension    • Irregular heart beat    • Kidney stone    • Multinodular goiter 12/12/2018   • Sleep apnea      Past Surgical History:   Procedure Laterality Date   • US GUIDED MSK PROCEDURE  4/23/2019   • US GUIDED THYROID BIOPSY  12/28/2018     Social History   Social History     Substance and Sexual Activity   Alcohol Use Yes   • Alcohol/week: 1 0 standard drink   • Types: 1 Cans of beer per week    Comment: Occasionally     Social History     Substance and Sexual Activity   Drug Use No     Social History     Tobacco Use   Smoking Status Never   Smokeless Tobacco Never     Family History:   Family History   Problem Relation Age of Onset   • Hyperlipidemia Mother    • Heart block Mother    • Stroke Mother    • Hypertension Mother • Kidney nephrosis Father    • Stroke Maternal Grandmother    • No Known Problems Maternal Grandfather    • Cancer Paternal Grandmother    • Heart disease Paternal Grandfather        Meds/Allergies    Current Outpatient Medications   Medication Sig Dispense Refill   • diltiazem (CARDIZEM CD) 180 mg 24 hr capsule Take 1 capsule (180 mg total) by mouth daily 90 capsule 1   • fluticasone (FLONASE) 50 mcg/act nasal spray 1 spray into each nostril daily PRN     • hydrochlorothiazide (HYDRODIURIL) 25 mg tablet Take 0 5 tablets (12 5 mg total) by mouth daily 45 tablet 0   • lansoprazole (PREVACID) 30 mg capsule Take 1 capsule (30 mg total) by mouth as needed (LPR) 90 capsule 1   • lisinopril-hydrochlorothiazide (PRINZIDE,ZESTORETIC) 20-25 MG per tablet Take 1 tablet by mouth daily 90 tablet 1   • Multiple Vitamins-Minerals (multivitamin with minerals) tablet Take 1 tablet by mouth daily       No current facility-administered medications for this visit  No Known Allergies    Objective    Vitals: Blood pressure 150/78, pulse 82, height 5' 10" (1 778 m), weight 131 kg (288 lb 6 4 oz), SpO2 96 %  Physical Exam  Vitals reviewed  Constitutional:       General: He is not in acute distress  Appearance: He is well-developed and well-nourished  He is obese  He is not diaphoretic  HENT:      Head: Normocephalic and atraumatic  Mouth/Throat:      Mouth: Oropharynx is clear and moist and mucous membranes are normal       Pharynx: No oropharyngeal exudate  Eyes:      General: Lids are normal  No scleral icterus  Right eye: No discharge  Left eye: No discharge  Extraocular Movements: EOM normal       Conjunctiva/sclera: Conjunctivae normal    Neck:      Thyroid: No thyromegaly  Cardiovascular:      Rate and Rhythm: Normal rate and regular rhythm  Heart sounds: Normal heart sounds  No murmur heard  No friction rub  No gallop     Pulmonary:      Effort: Pulmonary effort is normal  No respiratory distress  Breath sounds: Normal breath sounds  No wheezing  Abdominal:      General: Bowel sounds are normal  There is no distension  Palpations: Abdomen is soft  Tenderness: There is no abdominal tenderness  Musculoskeletal:         General: No tenderness, deformity or edema  Normal range of motion  Cervical back: Neck supple  Lymphadenopathy:      Head:      Right side of head: No occipital adenopathy  Left side of head: No occipital adenopathy  Upper Body:      Right upper body: No supraclavicular adenopathy  Left upper body: No supraclavicular adenopathy  Skin:     General: Skin is warm and intact  Findings: No erythema or rash  Neurological:      Mental Status: He is alert and oriented to person, place, and time  Cranial Nerves: No cranial nerve deficit  Coordination: Coordination normal    Psychiatric:         Mood and Affect: Mood and affect and mood normal          Behavior: Behavior normal          The history was obtained from the review of the chart, patient  Lab Results:   Lab Results   Component Value Date/Time    TSH 3RD GENERATON 1 360 03/30/2022 07:28 AM       Imaging Studies:   Results for orders placed during the hospital encounter of 11/13/22    US thyroid    Impression  1  Most nodules are relatively stable with previous non-malignant biopsy results as above  Based on 2015 CATRACHITA guidelines, additional followup ultrasound should be performed in 12 to 24 months  2   Nodule #3 is unchanged and meets criteria for biopsy although its location could make biopsy very difficult or not possible  Reference: ACR Thyroid Imaging, Reporting and Data System (TI-RADS): White Paper of the [employer]  J AM Dharmesh Radiol 5685;[id number]  (additional recommendations based on American Thyroid Association 2015 guidelines )      Workstation performed: [id number]      I have personally reviewed pertinent reports        Portions of the record may have been created with voice recognition software  Occasional wrong word or "sound a like" substitutions may have occurred due to the inherent limitations of voice recognition software  Read the chart carefully and recognize, using context, where substitutions have occurred

## 2022-12-05 NOTE — PROGRESS NOTES
Melissa Vigil 47 y o  male MRN: [id number]    Encounter: [id number]      Assessment/Plan     Assessment: This is a 47y o -year-old male with thyroid nodules, vitamin-D deficiency, iron deficiency and morbid obesity  Plan:  1  Thyroid nodules-his right midpole thyroid nodule is above the criteria for recommending surgery  The right midpole nodule is almost 5 cm and has grown more than 20% at least in one direction  The [place] guidelines recommend that he should undergo surgical intervention for any nodule greater than 4 cm  This is what I have recommended  He would like to think about this and decide within the next 4 to 6 weeks if he would want to pursue surgical intervention  I did advise him that either Dr Swapnil Horton or Dr Antonio Kearney would do the surgery  Additionally, we discussed that the left thyroid nodule would need ultrasound follow-up if he decided to only have a right hemithyroidectomy  If on surgical pathology, there was evidence of thyroid cancer he would need a completion thyroidectomy as well depending on what the cancer was  He did state that he is trying to avoid surgery and if there are any other options  I reiterated what the recommendations are but he gets to decide what he would like to do  He is going to see me as needed and will follow with Dr Shantell Oropeza  2  25 hydroxy vitamin-D level will be checked for vitamin-D deficiency  3  Iron deficiency-since he is on a proton pump inhibitor, he could develop iron deficiency  Therefore, check iron, TIBC, ferritin and % saturation  4  Morbid obesity appears to be stable  CC:   Thyroid nodules    History of Present Illness     HPI:  50-year-old man with multiple thyroid nodules discovered in 2018 presents for consultation  In 2018, he was noted to have for thyroid nodules one of them in the right mid pole was 4 cm  He underwent biopsy of the four nodules which were reportedly benign    He had a repeat ultrasound twice which showed increase in the 4 cm nodule and is now close to 5 cm  He denies any difficulty swallowing or breathing  There is no family history of thyroid cancer or personal history of radiation exposure  Review of Systems   Constitutional: Negative for chills and fever  HENT: Negative for trouble swallowing  Respiratory: Negative for shortness of breath  Cardiovascular: Negative for chest pain  Gastrointestinal: Negative for constipation, diarrhea, nausea and vomiting  All other systems reviewed and are negative        Historical Information   Past Medical History:   Diagnosis Date   • Herpes zoster without complication 5/8/5606   • Hypertension    • Irregular heart beat    • Kidney stone    • Multinodular goiter 12/12/2018   • Sleep apnea      Past Surgical History:   Procedure Laterality Date   • US GUIDED MSK PROCEDURE  4/23/2019   • US GUIDED THYROID BIOPSY  12/28/2018     Social History   Social History     Substance and Sexual Activity   Alcohol Use Yes   • Alcohol/week: 1 0 standard drink   • Types: 1 Cans of beer per week    Comment: Occasionally     Social History     Substance and Sexual Activity   Drug Use No     Social History     Tobacco Use   Smoking Status Never   Smokeless Tobacco Never     Family History:   Family History   Problem Relation Age of Onset   • Hyperlipidemia Mother    • Heart block Mother    • Stroke Mother    • Hypertension Mother    • Kidney nephrosis Father    • Stroke Maternal Grandmother    • No Known Problems Maternal Grandfather    • Cancer Paternal Grandmother    • Heart disease Paternal Grandfather        Meds/Allergies   Current Outpatient Medications   Medication Sig Dispense Refill   • diltiazem (CARDIZEM CD) 180 mg 24 hr capsule Take 1 capsule (180 mg total) by mouth daily 90 capsule 1   • fluticasone (FLONASE) 50 mcg/act nasal spray 1 spray into each nostril daily PRN     • hydrochlorothiazide (HYDRODIURIL) 25 mg tablet Take 0 5 tablets (12 5 mg total) by mouth daily 45 tablet 0   • lansoprazole (PREVACID) 30 mg capsule Take 1 capsule (30 mg total) by mouth as needed (LPR) 90 capsule 1   • lisinopril-hydrochlorothiazide (PRINZIDE,ZESTORETIC) 20-25 MG per tablet Take 1 tablet by mouth daily 90 tablet 1   • Multiple Vitamins-Minerals (multivitamin with minerals) tablet Take 1 tablet by mouth daily       No current facility-administered medications for this visit  No Known Allergies    Objective   Vitals: Blood pressure 150/78, pulse 82, height 5' 10" (1 778 m), weight 131 kg (288 lb 6 4 oz), SpO2 96 %  Physical Exam  Vitals reviewed  Constitutional:       General: He is not in acute distress  Appearance: He is well-developed and well-nourished  He is obese  He is not diaphoretic  HENT:      Head: Normocephalic and atraumatic  Mouth/Throat:      Mouth: Oropharynx is clear and moist and mucous membranes are normal       Pharynx: No oropharyngeal exudate  Eyes:      General: Lids are normal  No scleral icterus  Right eye: No discharge  Left eye: No discharge  Extraocular Movements: EOM normal       Conjunctiva/sclera: Conjunctivae normal    Neck:      Thyroid: No thyromegaly  Cardiovascular:      Rate and Rhythm: Normal rate and regular rhythm  Heart sounds: Normal heart sounds  No murmur heard  No friction rub  No gallop  Pulmonary:      Effort: Pulmonary effort is normal  No respiratory distress  Breath sounds: Normal breath sounds  No wheezing  Abdominal:      General: Bowel sounds are normal  There is no distension  Palpations: Abdomen is soft  Tenderness: There is no abdominal tenderness  Musculoskeletal:         General: No tenderness, deformity or edema  Normal range of motion  Cervical back: Neck supple  Lymphadenopathy:      Head:      Right side of head: No occipital adenopathy  Left side of head: No occipital adenopathy        Upper Body:      Right upper body: No supraclavicular adenopathy  Left upper body: No supraclavicular adenopathy  Skin:     General: Skin is warm and intact  Findings: No erythema or rash  Neurological:      Mental Status: He is alert and oriented to person, place, and time  Cranial Nerves: No cranial nerve deficit  Coordination: Coordination normal    Psychiatric:         Mood and Affect: Mood and affect and mood normal          Behavior: Behavior normal          The history was obtained from the review of the chart, patient  Lab Results:   Lab Results   Component Value Date/Time    TSH 3RD GENERATON 1 360 03/30/2022 07:28 AM       Imaging Studies:   Results for orders placed during the hospital encounter of 11/13/22    US thyroid    Impression  1  Most nodules are relatively stable with previous non-malignant biopsy results as above  Based on 2015 [place] guidelines, additional followup ultrasound should be performed in 12 to 24 months  2   Nodule #3 is unchanged and meets criteria for biopsy although its location could make biopsy very difficult or not possible  Reference: ACR Thyroid Imaging, Reporting and Data System (TI-RADS): White Paper of the [employer]  J AM Dharmesh Radiol 1267;04:074-861  (additional recommendations based on American Thyroid Association 2015 guidelines )      Workstation performed: [id number]      I have personally reviewed pertinent reports  Portions of the record may have been created with voice recognition software  Occasional wrong word or "sound a like" substitutions may have occurred due to the inherent limitations of voice recognition software  Read the chart carefully and recognize, using context, where substitutions have occurred

## 2022-12-06 ENCOUNTER — OFFICE VISIT (OUTPATIENT)
Dept: INTERNAL MEDICINE CLINIC | Facility: OTHER | Age: 52
End: 2022-12-06

## 2022-12-06 VITALS
HEART RATE: 72 BPM | TEMPERATURE: 98.3 F | WEIGHT: 285.8 LBS | OXYGEN SATURATION: 97 % | DIASTOLIC BLOOD PRESSURE: 76 MMHG | SYSTOLIC BLOOD PRESSURE: 132 MMHG | RESPIRATION RATE: 20 BRPM | HEIGHT: 70 IN | BODY MASS INDEX: 40.92 KG/M2

## 2022-12-06 DIAGNOSIS — Z00.00 ANNUAL PHYSICAL EXAM: ICD-10-CM

## 2022-12-06 DIAGNOSIS — Z12.5 PROSTATE CANCER SCREENING: ICD-10-CM

## 2022-12-06 DIAGNOSIS — E04.2 MULTINODULAR GOITER: ICD-10-CM

## 2022-12-06 DIAGNOSIS — I10 ESSENTIAL HYPERTENSION: Primary | ICD-10-CM

## 2022-12-06 DIAGNOSIS — N20.0 BILATERAL NEPHROLITHIASIS: ICD-10-CM

## 2022-12-06 DIAGNOSIS — K80.20 CALCULUS OF GALLBLADDER WITHOUT CHOLECYSTITIS WITHOUT OBSTRUCTION: ICD-10-CM

## 2022-12-06 DIAGNOSIS — K76.0 NAFL (NONALCOHOLIC FATTY LIVER): ICD-10-CM

## 2022-12-06 DIAGNOSIS — R73.01 IMPAIRED FASTING BLOOD SUGAR: ICD-10-CM

## 2022-12-06 RX ORDER — LISINOPRIL AND HYDROCHLOROTHIAZIDE 25; 20 MG/1; MG/1
1 TABLET ORAL DAILY
Qty: 90 TABLET | Refills: 1 | Status: SHIPPED | OUTPATIENT
Start: 2022-12-06

## 2022-12-06 NOTE — PROGRESS NOTES
ADULT ANNUAL [address] [facility] PRIMARY CARE [place]    NAME: Phillip Hall  AGE: 47 y o  SEX: male  : 1970     DATE: 2022     Assessment and Plan:     Problem List Items Addressed This Visit        Digestive    Calculus of gallbladder without cholecystitis without obstruction     Will continue to monitor clinically  Relevant Orders    CBC    Comprehensive metabolic panel    NAFL (nonalcoholic fatty liver)     Continue to trend LFTs  Relevant Orders    CBC    Comprehensive metabolic panel       Endocrine    Multinodular goiter     Follow up with ENT  Relevant Orders    CBC    Comprehensive metabolic panel    TSH, 3rd generation with Free T4 reflex    Impaired fasting blood sugar    Relevant Orders    CBC    Comprehensive metabolic panel    Hemoglobin A1C    Microalbumin / creatinine urine ratio       Cardiovascular and Mediastinum    Essential hypertension    Relevant Medications    lisinopril-hydrochlorothiazide (PRINZIDE,ZESTORETIC) 20-25 MG per tablet    Other Relevant Orders    CBC    Comprehensive metabolic panel    Lipid panel    Microalbumin / creatinine urine ratio       Genitourinary    Bilateral nephrolithiasis     Will refer to urology  Relevant Orders    Ambulatory Referral to Urology       Other    Annual physical exam - Primary     Discussed diet and exercise per  He is up-to-date on immunizations  He is up-to-date on colorectal cancer screening  He is up-to-date on prostate cancer screening  Other Visit Diagnoses     Prostate cancer screening        Relevant Orders    PSA, Total Screen          Immunizations and preventive care screenings were discussed with patient today  Appropriate education was printed on patient's after visit summary  Discussed risks and benefits of prostate cancer screening   We discussed the controversial history of PSA screening for prostate cancer in the [place] as well as the risk of over detection and over treatment of prostate cancer by way of PSA screening  The patient understands that PSA blood testing is an imperfect way to screen for prostate cancer and that elevated PSA levels in the blood may also be caused by infection, inflammation, prostatic trauma or manipulation, urological procedures, or by benign prostatic enlargement  The role of the digital rectal examination in prostate cancer screening was also discussed and I discussed with him that there is large interobserver variability in the findings of digital rectal examination  Counseling:  Alcohol/drug use: discussed moderation in alcohol intake, the recommendations for healthy alcohol use, and avoidance of illicit drug use  Dental Health: discussed importance of regular tooth brushing, flossing, and dental visits  Injury prevention: discussed safety/seat belts, safety helmets, smoke detectors, carbon dioxide detectors, and smoking near bedding or upholstery  Sexual health: discussed sexually transmitted diseases, partner selection, use of condoms, avoidance of unintended pregnancy, and contraceptive alternatives  Exercise: the importance of regular exercise/physical activity was discussed  Recommend exercise 3-5 times per week for at least 30 minutes  Return in about 6 months (around 6/6/2023) for Next scheduled follow up  Chief Complaint:     Chief Complaint   Patient presents with   • Follow-up     2 month US done 10/29 and 11/13,  labs done 10/12   • hm     Annual exam, HIV   • Physical Exam     Needs physical form filled in for [employer]   • Hypertension     Has BP log      History of Present Illness:     Adult Annual Physical   Patient here for a comprehensive physical exam  The patient reports problems - thyroid nodules: recommendations per endocrinology are for nodule resection       Diet and Physical Activity  Diet/Nutrition: well balanced diet     Exercise: 3-4 times a week on average  Depression Screening  PHQ-2/9 Depression Screening         General Health  Sleep: gets 7-8 hours of sleep on average  Hearing: normal - bilateral   Vision: no vision problems, goes for regular eye exams and wears glasses  Dental: regular dental visits, brushes teeth twice daily and flosses teeth occasionally   Health  Symptoms include: none     Review of Systems:     Review of Systems   Constitutional: Negative for activity change, appetite change, chills, diaphoresis, fatigue and fever  HENT: Negative for congestion, postnasal drip, rhinorrhea, sinus pressure, sinus pain, sneezing and sore throat  Eyes: Negative for visual disturbance  Respiratory: Negative for apnea, cough, choking, chest tightness, shortness of breath and wheezing  Cardiovascular: Negative for chest pain, palpitations and leg swelling  Gastrointestinal: Negative for abdominal distention, abdominal pain, anal bleeding, blood in stool, constipation, diarrhea, nausea and vomiting  Endocrine: Negative for cold intolerance and heat intolerance  Genitourinary: Negative for difficulty urinating, dysuria and hematuria  Musculoskeletal: Negative  Skin: Negative  Neurological: Negative for dizziness, weakness, light-headedness, numbness and headaches  Hematological: Negative for adenopathy  Psychiatric/Behavioral: Negative for agitation, sleep disturbance and suicidal ideas  All other systems reviewed and are negative       Past Medical History:     Past Medical History:   Diagnosis Date   • Herpes zoster without complication 7/0/5429   • Hypertension    • Irregular heart beat    • Kidney stone    • Multinodular goiter 12/12/2018   • Sleep apnea       Past Surgical History:     Past Surgical History:   Procedure Laterality Date   • US GUIDED MSK PROCEDURE  4/23/2019   • US GUIDED THYROID BIOPSY  12/28/2018      Family History:     Family History   Problem Relation Age of Onset   • Hyperlipidemia Mother    • Heart block Mother    • Stroke Mother    • Hypertension Mother    • Kidney nephrosis Father    • Stroke Maternal Grandmother    • No Known Problems Maternal Grandfather    • Cancer Paternal Grandmother    • Heart disease Paternal Grandfather       Social History:     Social History     Socioeconomic History   • Marital status:      Spouse name: None   • Number of children: None   • Years of education: None   • Highest education level: None   Occupational History   • None   Tobacco Use   • Smoking status: Never   • Smokeless tobacco: Never   Vaping Use   • Vaping Use: Never used   Substance and Sexual Activity   • Alcohol use: Yes     Alcohol/week: 1 0 standard drink     Types: 1 Cans of beer per week     Comment: Occasionally   • Drug use: No   • Sexual activity: Not Currently   Other Topics Concern   • None   Social History Narrative   • None     Social Determinants of Health     Financial Resource Strain: Not on file   Food Insecurity: Not on file   Transportation Needs: Not on file   Physical Activity: Not on file   Stress: Not on file   Social Connections: Not on file   Intimate Partner Violence: Not on file   Housing Stability: Not on file      Current Medications:     Current Outpatient Medications   Medication Sig Dispense Refill   • diltiazem (CARDIZEM CD) 180 mg 24 hr capsule Take 1 capsule (180 mg total) by mouth daily 90 capsule 1   • fluticasone (FLONASE) 50 mcg/act nasal spray 1 spray into each nostril daily PRN     • lansoprazole (PREVACID) 30 mg capsule Take 1 capsule (30 mg total) by mouth as needed (LPR) 90 capsule 1   • lisinopril-hydrochlorothiazide (PRINZIDE,ZESTORETIC) 20-25 MG per tablet Take 1 tablet by mouth daily 90 tablet 1   • Multiple Vitamins-Minerals (multivitamin with minerals) tablet Take 1 tablet by mouth daily       No current facility-administered medications for this visit        Allergies:     No Known Allergies   Physical Exam:     /76 (BP Location: Left arm, Patient Position: Sitting, Cuff Size: Large)   Pulse 72   Temp 98 3 °F (36 8 °C) (Temporal)   Resp 20   Ht 5' 10" (1 778 m)   Wt 130 kg (285 lb 12 8 oz)   SpO2 97%   BMI 41 01 kg/m²     Physical Exam  Vitals and nursing note reviewed  Constitutional:       General: He is not in acute distress  Appearance: Normal appearance  He is normal weight  He is not ill-appearing, toxic-appearing or diaphoretic  HENT:      Head: Normocephalic and atraumatic  Right Ear: Tympanic membrane, ear canal and external ear normal  There is no impacted cerumen  Left Ear: Tympanic membrane, ear canal and external ear normal  There is no impacted cerumen  Nose: Nose normal  No congestion or rhinorrhea  Mouth/Throat:      Mouth: Mucous membranes are moist       Pharynx: Oropharynx is clear  No oropharyngeal exudate or posterior oropharyngeal erythema  Eyes:      General: No scleral icterus  Right eye: No discharge  Left eye: No discharge  Extraocular Movements: Extraocular movements intact  Conjunctiva/sclera: Conjunctivae normal       Pupils: Pupils are equal, round, and reactive to light  Neck:      Vascular: No carotid bruit  Cardiovascular:      Rate and Rhythm: Normal rate and regular rhythm  Pulses: Normal pulses  Heart sounds: Normal heart sounds  No murmur heard  No friction rub  No gallop  Pulmonary:      Effort: Pulmonary effort is normal  No respiratory distress  Breath sounds: Normal breath sounds  No wheezing or rales  Abdominal:      General: Abdomen is flat  Bowel sounds are normal  There is no distension  Palpations: Abdomen is soft  There is no mass  Tenderness: There is no abdominal tenderness  There is no guarding  Hernia: No hernia is present  Musculoskeletal:         General: No swelling, tenderness, deformity or signs of injury  Normal range of motion        Cervical back: Normal range of motion and neck supple  No rigidity  No muscular tenderness  Right lower leg: No edema  Left lower leg: No edema  Lymphadenopathy:      Cervical: No cervical adenopathy  Skin:     General: Skin is warm and dry  Capillary Refill: Capillary refill takes less than 2 seconds  Coloration: Skin is not jaundiced or pale  Findings: No bruising, erythema, lesion or rash  Neurological:      General: No focal deficit present  Mental Status: He is alert and oriented to person, place, and time  Mental status is at baseline  Cranial Nerves: No cranial nerve deficit  Sensory: No sensory deficit  Motor: No weakness  Coordination: Coordination normal       Gait: Gait normal       Deep Tendon Reflexes: Reflexes normal    Psychiatric:         Mood and Affect: Mood normal          Behavior: Behavior normal          Thought Content:  Thought content normal          Judgment: Judgment normal           Catracho Galaviz MD  [address] PR[facility] [place]

## 2022-12-06 NOTE — ASSESSMENT & PLAN NOTE
Discussed diet and exercise per  He is up-to-date on immunizations  He is up-to-date on colorectal cancer screening  He is up-to-date on prostate cancer screening

## 2022-12-07 NOTE — PROGRESS NOTES
Assessment/Plan:    Calculus of gallbladder without cholecystitis without obstruction  Will continue to monitor clinically  Multinodular goiter  Follow up with ENT  NAFL (nonalcoholic fatty liver)  Continue to trend LFTs  Annual physical exam  Discussed diet and exercise per  He is up-to-date on immunizations  He is up-to-date on colorectal cancer screening  He is up-to-date on prostate cancer screening  Bilateral nephrolithiasis  Will refer to urology  Diagnoses and all orders for this visit:    Essential hypertension  -     lisinopril-hydrochlorothiazide (PRINZIDE,ZESTORETIC) 20-25 MG per tablet; Take 1 tablet by mouth daily  -     CBC; Future  -     Comprehensive metabolic panel; Future  -     Lipid panel; Future  -     Microalbumin / creatinine urine ratio; Future    Calculus of gallbladder without cholecystitis without obstruction  -     CBC; Future  -     Comprehensive metabolic panel; Future    Multinodular goiter  -     CBC; Future  -     Comprehensive metabolic panel; Future  -     TSH, 3rd generation with Free T4 reflex; Future    Annual physical exam    NAFL (nonalcoholic fatty liver)  -     CBC; Future  -     Comprehensive metabolic panel; Future    Bilateral nephrolithiasis  -     Ambulatory Referral to Urology; Future    Prostate cancer screening  -     PSA, Total Screen; Future    Impaired fasting blood sugar  -     CBC; Future  -     Comprehensive metabolic panel; Future  -     Hemoglobin A1C; Future  -     Microalbumin / creatinine urine ratio; Future                  Subjective:      Patient ID: Getachew Harris is a 47 y o  male  Chief Complaint   Patient presents with   • Follow-up     2 month US done 10/29 and 11/13,  labs done 10/12   •      Annual exam, HIV   • Physical Exam     Needs physical form filled in for [employer]   • Hypertension     Has BP log       50-year-old male seen today for follow-up of chronic conditions    Recent laboratory and imaging studies reviewed today with the patient  He has established care with endocrinology regarding thyroid goiter to which recommendations are surgical invention however patient would like to defer at this time  He denies any symptomatology of hyper or hypothyroidism  Otherwise, he has been compliant with his medication regimen and has no active complaints or concerns at this time  Hypertension  This is a chronic problem  The current episode started more than 1 year ago  The problem is unchanged  The problem is controlled  Pertinent negatives include no chest pain, headaches, palpitations or shortness of breath  Past treatments include calcium channel blockers, ACE inhibitors and diuretics  The current treatment provides moderate improvement  There are no compliance problems  Identifiable causes of hypertension include a thyroid problem  Thyroid Problem  Presents for follow-up visit  Patient reports no cold intolerance, constipation, diaphoresis, diarrhea, fatigue, heat intolerance or palpitations  The symptoms have been stable  Heartburn  He reports no abdominal pain, no chest pain, no choking, no coughing, no nausea, no sore throat or no wheezing  This is a chronic problem  The current episode started more than 1 year ago  The problem occurs rarely  Pertinent negatives include no fatigue  He has tried a PPI for the symptoms  The treatment provided moderate relief  The following portions of the patient's history were reviewed and updated as appropriate: allergies, current medications, past family history, past medical history, past social history, past surgical history and problem list     Review of Systems   Constitutional: Negative for activity change, appetite change, chills, diaphoresis, fatigue and fever  HENT: Negative for congestion, postnasal drip, rhinorrhea, sinus pressure, sinus pain, sneezing and sore throat  Eyes: Negative for visual disturbance     Respiratory: Negative for apnea, cough, choking, chest tightness, shortness of breath and wheezing  Cardiovascular: Negative for chest pain, palpitations and leg swelling  Gastrointestinal: Negative for abdominal distention, abdominal pain, anal bleeding, blood in stool, constipation, diarrhea, nausea and vomiting  Endocrine: Negative for cold intolerance and heat intolerance  Genitourinary: Negative for difficulty urinating, dysuria and hematuria  Musculoskeletal: Negative  Skin: Negative  Neurological: Negative for dizziness, weakness, light-headedness, numbness and headaches  Hematological: Negative for adenopathy  Psychiatric/Behavioral: Negative for agitation, sleep disturbance and suicidal ideas  All other systems reviewed and are negative          Past Medical History:   Diagnosis Date   • Herpes zoster without complication 9/1/3892   • Hypertension    • Irregular heart beat    • Kidney stone    • Multinodular goiter 12/12/2018   • Sleep apnea          Current Outpatient Medications:   •  diltiazem (CARDIZEM CD) 180 mg 24 hr capsule, Take 1 capsule (180 mg total) by mouth daily, Disp: 90 capsule, Rfl: 1  •  fluticasone (FLONASE) 50 mcg/act nasal spray, 1 spray into each nostril daily PRN, Disp: , Rfl:   •  lansoprazole (PREVACID) 30 mg capsule, Take 1 capsule (30 mg total) by mouth as needed (LPR), Disp: 90 capsule, Rfl: 1  •  lisinopril-hydrochlorothiazide (PRINZIDE,ZESTORETIC) 20-25 MG per tablet, Take 1 tablet by mouth daily, Disp: 90 tablet, Rfl: 1  •  Multiple Vitamins-Minerals (multivitamin with minerals) tablet, Take 1 tablet by mouth daily, Disp: , Rfl:     No Known Allergies    Social History   Past Surgical History:   Procedure Laterality Date   • US GUIDED MSK PROCEDURE  4/23/2019   • US GUIDED THYROID BIOPSY  12/28/2018     Family History   Problem Relation Age of Onset   • Hyperlipidemia Mother    • Heart block Mother    • Stroke Mother    • Hypertension Mother    • Kidney nephrosis Father    • Stroke Maternal Grandmother    • No Known Problems Maternal Grandfather    • Cancer Paternal Grandmother    • Heart disease Paternal Grandfather        Objective:  /76 (BP Location: Left arm, Patient Position: Sitting, Cuff Size: Large)   Pulse 72   Temp 98 3 °F (36 8 °C) (Temporal)   Resp 20   Ht 5' 10" (1 778 m)   Wt 130 kg (285 lb 12 8 oz)   SpO2 97%   BMI 41 01 kg/m²     Recent Results (from the past 1344 hour(s))   Vitamin D 25 hydroxy Lab Collect    Collection Time: 12/05/22  9:11 AM   Result Value Ref Range    Vit D, 25-Hydroxy 35 8 30 0 - 100 0 ng/mL   Iron Saturation %    Collection Time: 12/05/22  9:11 AM   Result Value Ref Range    Iron Saturation 46 20 - 50 %    TIBC 280 250 - 450 ug/dL    Iron 129 65 - 175 ug/dL   Ferritin    Collection Time: 12/05/22  9:11 AM   Result Value Ref Range    Ferritin 201 8 - 388 ng/mL            Physical Exam  Vitals and nursing note reviewed  Constitutional:       General: He is not in acute distress  Appearance: He is well-developed  He is not diaphoretic  HENT:      Head: Normocephalic and atraumatic  Eyes:      General: No scleral icterus  Right eye: No discharge  Left eye: No discharge  Conjunctiva/sclera: Conjunctivae normal       Pupils: Pupils are equal, round, and reactive to light  Neck:      Thyroid: No thyromegaly  Vascular: No JVD  Cardiovascular:      Rate and Rhythm: Normal rate and regular rhythm  Heart sounds: Normal heart sounds  No murmur heard  No friction rub  No gallop  Pulmonary:      Effort: Pulmonary effort is normal  No respiratory distress  Breath sounds: Normal breath sounds  No wheezing or rales  Chest:      Chest wall: No tenderness  Abdominal:      General: Bowel sounds are normal  There is no distension  Palpations: Abdomen is soft  There is no mass  Tenderness: There is no abdominal tenderness  There is no guarding or rebound     Musculoskeletal:         General: No tenderness or deformity  Normal range of motion  Cervical back: Normal range of motion and neck supple  Lymphadenopathy:      Cervical: No cervical adenopathy  Skin:     General: Skin is warm and dry  Coloration: Skin is not pale  Findings: No erythema or rash  Neurological:      Mental Status: He is alert and oriented to person, place, and time  Cranial Nerves: No cranial nerve deficit  Coordination: Coordination normal       Deep Tendon Reflexes: Reflexes are normal and symmetric  Psychiatric:         Behavior: Behavior normal          Thought Content:  Thought content normal          Judgment: Judgment normal

## 2022-12-14 ENCOUNTER — TELEPHONE (OUTPATIENT)
Dept: UROLOGY | Facility: MEDICAL CENTER | Age: 52
End: 2022-12-14

## 2022-12-14 NOTE — TELEPHONE ENCOUNTER
Please Triage  New Patient    What is the reason for the patient’s appointment? Pt called the office and scheduled a NP appt for 5 mm nonobstructing the calculus in the interpolar region with 10 mm calculus in the lower pole    What office location does the patient prefer? [facility]     Imaging/Lab Results: in epic     Do we accept the patient's insurance or is the patient Self-Pay? Insurance Provider: [employer]  Plan Type/Number:  Member ID#: Has the patient had any previous Urologist(s)? Yes [facility]     Have patient records been requested? If not are records showing in Epic: in epic     Has the patient had any outside testing done?in [facility]     Does the patient have a personal history of cancer?  No

## 2023-02-08 ENCOUNTER — OFFICE VISIT (OUTPATIENT)
Dept: UROLOGY | Facility: CLINIC | Age: 53
End: 2023-02-08

## 2023-02-08 VITALS
BODY MASS INDEX: 40.37 KG/M2 | DIASTOLIC BLOOD PRESSURE: 80 MMHG | HEIGHT: 70 IN | WEIGHT: 282 LBS | SYSTOLIC BLOOD PRESSURE: 130 MMHG

## 2023-02-08 DIAGNOSIS — N20.0 BILATERAL NEPHROLITHIASIS: ICD-10-CM

## 2023-02-08 LAB
SL AMB  POCT GLUCOSE, UA: NORMAL
SL AMB LEUKOCYTE ESTERASE,UA: NORMAL
SL AMB POCT BILIRUBIN,UA: NORMAL
SL AMB POCT BLOOD,UA: NORMAL
SL AMB POCT CLARITY,UA: CLEAR
SL AMB POCT COLOR,UA: YELLOW
SL AMB POCT KETONES,UA: NORMAL
SL AMB POCT NITRITE,UA: NORMAL
SL AMB POCT PH,UA: 5
SL AMB POCT SPECIFIC GRAVITY,UA: 1.01
SL AMB POCT URINE PROTEIN: NORMAL
SL AMB POCT UROBILINOGEN: 0.2

## 2023-02-08 NOTE — PROGRESS NOTES
Referring Physician: Allegra Castro MD  A copy of this note was sent to the referring physician  Diagnoses and all orders for this visit:    Bilateral nephrolithiasis  -     Ambulatory Referral to Urology  -     POCT urine dip  -     CT renal stone study abdomen pelvis wo contrast; Future            Assessment and plan:       1  Bilateral intrarenal calculi on ultrasound  -Asymptomatic  -    2  History of recurrent nephrolithiasis  -He was a treated with shockwave lithotripsy approximate 10 years ago    I have recommended a CT scan to stage the stones patient's stone burden  We discussed some possible outcomes including observation with a management plan for as needed treatment, versus preemptive intervention either in the form of ureteroscopy or potentially shockwave lithotripsy  Pros and cons of each were generally discussed we will save our targeted recommendations until the patient's axial imaging is complete  CT has been ordered and he will follow-up with our advanced practitioner team after the CT is complete  Sarahi Mireles MD      Chief Complaint     Stone consultation      History of Present Illness     Heather Wisdom is a 47 y o  referred in consultation for kidney stone present on ultrasound    Detailed Urologic History     - please refer to HPI    Review of Systems     Review of Systems   Constitutional: Negative for activity change and fatigue  HENT: Negative for congestion  Eyes: Negative for visual disturbance  Respiratory: Negative for shortness of breath and wheezing  Cardiovascular: Negative for chest pain and leg swelling  Gastrointestinal: Negative for abdominal pain  Endocrine: Negative for polyuria  Genitourinary: Negative for dysuria, flank pain, hematuria and urgency  Musculoskeletal: Negative for back pain  Allergic/Immunologic: Negative for immunocompromised state  Neurological: Negative for dizziness and numbness     Psychiatric/Behavioral: Negative for dysphoric mood  All other systems reviewed and are negative  AUA SYMPTOM SCORE    Flowsheet Row Most Recent Value   AUA SYMPTOM SCORE    How often have you had a sensation of not emptying your bladder completely after you finished urinating? 0 (P)     How often have you had to urinate again less than two hours after you finished urinating? 2 (P)     How often have you found you stopped and started again several times when you urinate? 0 (P)     How often have you found it difficult to postpone urination? 2 (P)     How often have you had a weak urinary stream? 1 (P)     How often have you had to push or strain to begin urination? 0 (P)     How many times did you most typically get up to urinate from the time you went to bed at night until the time you got up in the morning? 5 (P)     Quality of Life: If you were to spend the rest of your life with your urinary condition just the way it is now, how would you feel about that? 3 (P)     AUA SYMPTOM SCORE 10 (P)             Allergies     No Known Allergies    Physical Exam       Physical Exam  Constitutional:       General: He is not in acute distress  Appearance: He is well-developed  HENT:      Head: Normocephalic and atraumatic  Cardiovascular:      Comments: Negative lower extremity edema  Pulmonary:      Effort: Pulmonary effort is normal       Breath sounds: Normal breath sounds  Abdominal:      Palpations: Abdomen is soft  Musculoskeletal:         General: Normal range of motion  Cervical back: Normal range of motion  Skin:     General: Skin is warm  Neurological:      Mental Status: He is alert and oriented to person, place, and time     Psychiatric:         Behavior: Behavior normal            Vital Signs  Vitals:    02/08/23 1418   BP: 130/80   BP Location: Left arm   Patient Position: Sitting   Cuff Size: Adult   Weight: 128 kg (282 lb)   Height: 5' 10" (1 778 m)         Current Medications       Current Outpatient Medications:   •  diltiazem (CARDIZEM CD) 180 mg 24 hr capsule, Take 1 capsule (180 mg total) by mouth daily, Disp: 90 capsule, Rfl: 1  •  fluticasone (FLONASE) 50 mcg/act nasal spray, 1 spray into each nostril daily PRN, Disp: , Rfl:   •  lansoprazole (PREVACID) 30 mg capsule, Take 1 capsule (30 mg total) by mouth as needed (LPR), Disp: 90 capsule, Rfl: 1  •  lisinopril-hydrochlorothiazide (PRINZIDE,ZESTORETIC) 20-25 MG per tablet, Take 1 tablet by mouth daily, Disp: 90 tablet, Rfl: 1  •  Multiple Vitamins-Minerals (multivitamin with minerals) tablet, Take 1 tablet by mouth daily, Disp: , Rfl:       Active Problems     Patient Active Problem List   Diagnosis   • Allergic rhinitis   • Bilateral nephrolithiasis   • Essential hypertension   • Morbid obesity (HCC)   • Obstructive sleep apnea   • Paroxysmal SVT (supraventricular tachycardia) (HCC)   • Annual physical exam   • Multinodular goiter   • Synovial cyst of right popliteal space   • Laryngopharyngeal reflux (LPR)   • Tension headache   • Impaired fasting blood sugar   • Elevated liver enzymes   • Calculus of gallbladder without cholecystitis without obstruction   • NAFL (nonalcoholic fatty liver)         Past Medical History     Past Medical History:   Diagnosis Date   • Herpes zoster without complication 8/5/5202   • Hypertension    • Irregular heart beat    • Kidney stone    • Multinodular goiter 12/12/2018   • Sleep apnea          Surgical History     Past Surgical History:   Procedure Laterality Date   • US GUIDED MSK PROCEDURE  4/23/2019   • US GUIDED THYROID BIOPSY  12/28/2018         Family History     Family History   Problem Relation Age of Onset   • Hyperlipidemia Mother    • Heart block Mother    • Stroke Mother    • Hypertension Mother    • Kidney nephrosis Father    • Stroke Maternal Grandmother    • No Known Problems Maternal Grandfather    • Cancer Paternal Grandmother    • Heart disease Paternal Grandfather          Social History     Social History     Social History     Tobacco Use   Smoking Status Never   Smokeless Tobacco Never         Pertinent Lab Values     Lab Results   Component Value Date    CREATININE 1 01 10/12/2022       No results found for: PSA    @RESULTRCNT(1H])@      Pertinent Imaging     FINDINGS:     PANCREAS:  Obscured by bowel gas      AORTA AND IVC:  Visualized portions are normal for patient age      LIVER:  Size:  Moderately enlarged  The liver measures 19 2 cm in the midclavicular line  Contour:  Surface contour is smooth  Parenchyma: There is moderate diffuse increased echogenicity with smooth echotexture and acoustic beam attenuation  Most consistent with moderate hepatic steatosis  No liver mass identified  Limited imaging of the main portal vein shows it to be patent and hepatopetal      BILIARY:  The gallbladder is normal in caliber  No wall thickening or pericholecystic fluid  Shadowing gallstone(s) identified  No sonographic Parker's sign  No intrahepatic biliary dilatation  CBD measures 4 0 mm  No choledocholithiasis      KIDNEY:   Right kidney measures 11 3 x 4 8 x 5 9  cm  Volume 166 8 mL  5 mm nonobstructing the calculus in the interpolar region with 10 mm calculus in the lower pole  Left kidney measures 12 8 x 7 3 x 6 6 cm  Volume 323 1 mL  10 mm nonobstructing calculus in the interpolar region  SPLEEN:   Measures 11 2 x 10 7 x 3 8 cm  Volume 234 8 mL  Within normal limits      ASCITES:  None      IMPRESSION:     Hepatomegaly and hepatic steatosis  Cholelithiasis  Nephrolithiasis  Portions of the record may have been created with voice recognition software  Occasional wrong word or "sound a like" substitutions may have occurred due to the inherent limitations of voice recognition software  Read the chart carefully and recognize, using context, where substitutions have occurred

## 2023-02-09 LAB — BACTERIA UR CULT: NORMAL

## 2023-02-10 ENCOUNTER — HOSPITAL ENCOUNTER (OUTPATIENT)
Dept: RADIOLOGY | Age: 53
Discharge: HOME | End: 2023-02-10

## 2023-02-10 DIAGNOSIS — N20.0 BILATERAL NEPHROLITHIASIS: ICD-10-CM

## 2023-02-22 ENCOUNTER — OFFICE VISIT (OUTPATIENT)
Dept: UROLOGY | Facility: CLINIC | Age: 53
End: 2023-02-22

## 2023-02-22 VITALS
OXYGEN SATURATION: 96 % | WEIGHT: 280 LBS | SYSTOLIC BLOOD PRESSURE: 132 MMHG | BODY MASS INDEX: 40.09 KG/M2 | HEART RATE: 84 BPM | DIASTOLIC BLOOD PRESSURE: 80 MMHG | HEIGHT: 70 IN

## 2023-02-22 DIAGNOSIS — N20.0 BILATERAL NEPHROLITHIASIS: Primary | ICD-10-CM

## 2023-02-22 NOTE — PROGRESS NOTES
1  Bilateral nephrolithiasis  XR abdomen 1 view kub    Case request operating room: LITHOTRIPSY EXTRACORPORAL SHOCKWAVE (ESWL)    Case request operating room: LITHOTRIPSY EXTRACORPORAL SHOCKWAVE (ESWL)            Assessment and plan:       1  Bilateral intrarenal calculi on ultrasound  -Asymptomatic  -reviewed imaging - discussed URS versus ESWL  Risks and benefits of each procedure reviewed   -patient interesting in pursuing left ESWL  Stone visible on CT  imaging however will obtain a formal KUB prior to verification   -consent signed  -all questions answered  2   History of recurrent nephrolithiasis  -He was a treated with shockwave lithotripsy approximate 10 years ago        [place], PA-C      Chief Complaint     Stone follow up    History of Present Illness     Curry Collazo is a 47 y o  male presenting for follow up  Hx of stones  Recent CT showing b/l nonobstructing stones: up to 5mm in the right, 8mm left midpole stone  Medical comorbidities include goiter, YELENA, HTN, obesity  Not on anticoagulation  Review of Systems     Review of Systems   Constitutional: Negative for activity change and fatigue  HENT: Negative for congestion  Eyes: Negative for visual disturbance  Respiratory: Negative for shortness of breath and wheezing  Cardiovascular: Negative for chest pain and leg swelling  Gastrointestinal: Negative for abdominal pain  Endocrine: Negative for polyuria  Genitourinary: Negative for dysuria, flank pain, hematuria and urgency  Musculoskeletal: Negative for back pain  Allergic/Immunologic: Negative for immunocompromised state  Neurological: Negative for dizziness and numbness  Psychiatric/Behavioral: Negative for dysphoric mood  All other systems reviewed and are negative      AUA SYMPTOM SCORE    Flowsheet Row Most Recent Value   AUA SYMPTOM SCORE    How often have you had a sensation of not emptying your bladder completely after you finished urinating? 0 (P)     How often have you had to urinate again less than two hours after you finished urinating? 2 (P)     How often have you found you stopped and started again several times when you urinate? 0 (P)     How often have you found it difficult to postpone urination? 2 (P)     How often have you had a weak urinary stream? 1 (P)     How often have you had to push or strain to begin urination? 0 (P)     How many times did you most typically get up to urinate from the time you went to bed at night until the time you got up in the morning? 5 (P)     Quality of Life: If you were to spend the rest of your life with your urinary condition just the way it is now, how would you feel about that? 3 (P)     AUA SYMPTOM SCORE 10 (P)             Allergies     No Known Allergies    Physical Exam       Physical Exam  Constitutional:       General: He is not in acute distress  Appearance: He is well-developed  HENT:      Head: Normocephalic and atraumatic  Cardiovascular:      Comments: Negative lower extremity edema  RRR  No rubs/murmurs  Pulmonary:      Effort: Pulmonary effort is normal  CTABL     Breath sounds: Normal breath sounds  Abdominal:      Palpations: Abdomen is soft  Musculoskeletal:         General: Normal range of motion  Cervical back: Normal range of motion  Skin:     General: Skin is warm  Neurological:      Mental Status: He is alert and oriented to person, place, and time     Psychiatric:         Behavior: Behavior normal            Vital Signs  Vitals:    02/22/23 0810   BP: 132/80   BP Location: Left arm   Patient Position: Sitting   Pulse: 84   SpO2: 96%   Weight: 127 kg (280 lb)   Height: 5' 10" (1 778 m)         Current Medications       Current Outpatient Medications:   •  diltiazem (CARDIZEM CD) 180 mg 24 hr capsule, Take 1 capsule (180 mg total) by mouth daily, Disp: 90 capsule, Rfl: 1  •  fluticasone (FLONASE) 50 mcg/act nasal spray, 1 spray into each nostril daily PRN, Disp: , Rfl:   •  lansoprazole (PREVACID) 30 mg capsule, Take 1 capsule (30 mg total) by mouth as needed (LPR), Disp: 90 capsule, Rfl: 1  •  lisinopril-hydrochlorothiazide (PRINZIDE,ZESTORETIC) 20-25 MG per tablet, Take 1 tablet by mouth daily, Disp: 90 tablet, Rfl: 1  •  Multiple Vitamins-Minerals (multivitamin with minerals) tablet, Take 1 tablet by mouth daily, Disp: , Rfl:       Active Problems     Patient Active Problem List   Diagnosis   • Allergic rhinitis   • Bilateral nephrolithiasis   • Essential hypertension   • Morbid obesity ([facility] [address] )   • Obstructive sleep apnea   • Paroxysmal SVT (supraventricular tachycardia) (HCC)   • Annual physical exam   • Multinodular goiter   • Synovial cyst of right popliteal space   • Laryngopharyngeal reflux (LPR)   • Tension headache   • Impaired fasting blood sugar   • Elevated liver enzymes   • Calculus of gallbladder without cholecystitis without obstruction   • NAFL (nonalcoholic fatty liver)         Past Medical History     Past Medical History:   Diagnosis Date   • Herpes zoster without complication 3/2/2665   • Hypertension    • Irregular heart beat    • Kidney stone    • Multinodular goiter 12/12/2018   • Sleep apnea          Surgical History     Past Surgical History:   Procedure Laterality Date   • US GUIDED MSK PROCEDURE  4/23/2019   • US GUIDED THYROID BIOPSY  12/28/2018         Family History     Family History   Problem Relation Age of Onset   • Hyperlipidemia Mother    • Heart block Mother    • Stroke Mother    • Hypertension Mother    • Kidney nephrosis Father    • Stroke Maternal Grandmother    • No Known Problems Maternal Grandfather    • Cancer Paternal Grandmother    • Heart disease Paternal Grandfather          Social History     Social History     Social History     Tobacco Use   Smoking Status Never   Smokeless Tobacco Never         Pertinent Lab Values     Lab Results   Component Value Date    CREATININE 1 01 10/12/2022       No results found for: PSA    @RESULTRCNT(1H])@      Pertinent Imaging     FINDINGS:     PANCREAS:  Obscured by bowel gas      AORTA AND IVC:  Visualized portions are normal for patient age      LIVER:  Size:  Moderately enlarged  The liver measures 19 2 cm in the midclavicular line  Contour:  Surface contour is smooth  Parenchyma: There is moderate diffuse increased echogenicity with smooth echotexture and acoustic beam attenuation  Most consistent with moderate hepatic steatosis  No liver mass identified  Limited imaging of the main portal vein shows it to be patent and hepatopetal      BILIARY:  The gallbladder is normal in caliber  No wall thickening or pericholecystic fluid  Shadowing gallstone(s) identified  No sonographic Parker's sign  No intrahepatic biliary dilatation  CBD measures 4 0 mm  No choledocholithiasis      KIDNEY:   Right kidney measures 11 3 x 4 8 x 5 9  cm  Volume 166 8 mL  5 mm nonobstructing the calculus in the interpolar region with 10 mm calculus in the lower pole  Left kidney measures 12 8 x 7 3 x 6 6 cm  Volume 323 1 mL  10 mm nonobstructing calculus in the interpolar region  SPLEEN:   Measures 11 2 x 10 7 x 3 8 cm  Volume 234 8 mL  Within normal limits      ASCITES:  None      IMPRESSION:     Hepatomegaly and hepatic steatosis  Cholelithiasis  Nephrolithiasis  Portions of the record may have been created with voice recognition software  Occasional wrong word or "sound a like" substitutions may have occurred due to the inherent limitations of voice recognition software  Read the chart carefully and recognize, using context, where substitutions have occurred

## 2023-02-28 ENCOUNTER — TELEPHONE (OUTPATIENT)
Dept: INTERNAL MEDICINE CLINIC | Facility: OTHER | Age: 53
End: 2023-02-28

## 2023-02-28 DIAGNOSIS — I47.1 PAROXYSMAL SVT (SUPRAVENTRICULAR TACHYCARDIA) (HCC): ICD-10-CM

## 2023-02-28 DIAGNOSIS — Z12.11 ENCOUNTER FOR COLORECTAL CANCER SCREENING: Primary | ICD-10-CM

## 2023-02-28 DIAGNOSIS — K21.9 LARYNGOPHARYNGEAL REFLUX (LPR): ICD-10-CM

## 2023-02-28 DIAGNOSIS — Z12.12 ENCOUNTER FOR COLORECTAL CANCER SCREENING: Primary | ICD-10-CM

## 2023-02-28 RX ORDER — DILTIAZEM HYDROCHLORIDE 180 MG/1
180 CAPSULE, COATED, EXTENDED RELEASE ORAL DAILY
Qty: 90 CAPSULE | Refills: 1 | Status: SHIPPED | OUTPATIENT
Start: 2023-02-28

## 2023-02-28 RX ORDER — LANSOPRAZOLE 30 MG/1
30 CAPSULE, DELAYED RELEASE ORAL AS NEEDED
Qty: 90 CAPSULE | Refills: 1 | Status: SHIPPED | OUTPATIENT
Start: 2023-02-28

## 2023-02-28 NOTE — TELEPHONE ENCOUNTER
Patient calling because he has an upcoming appointment and he usually gets an occult blood, fecal immunochemical test done with his regular labs  Are you able to order this for him?

## 2023-03-06 ENCOUNTER — TELEPHONE (OUTPATIENT)
Dept: UROLOGY | Facility: MEDICAL CENTER | Age: 53
End: 2023-03-06

## 2023-03-06 NOTE — TELEPHONE ENCOUNTER
I spoke to the patient and scheduled his ESWL with Dr Erin Correa on 4/27/2023 at [facility]       -instructions given verbally and Emailed  -CBC, CMP, PT/PTT, Urine C&S  2 weeks prior  -patient will avoid any potentially blood thinning medications 7 days prior  -[facility] - on auth tracker 3/6/2023

## 2023-04-21 NOTE — PRE-PROCEDURE INSTRUCTIONS
Pre-Surgery Instructions:   Medication Instructions   • diltiazem (CARDIZEM CD) 180 mg 24 hr capsule Take day of surgery  • fluticasone (FLONASE) 50 mcg/act nasal spray Take day of surgery  • lansoprazole (PREVACID) 30 mg capsule Hold day of surgery  • lisinopril-hydrochlorothiazide (PRINZIDE,ZESTORETIC) 20-25 MG per tablet Hold day of surgery  • Multiple Vitamins-Minerals (multivitamin with minerals) tablet Stop taking 5 days prior to surgery  Medication instructions for day surgery reviewed  Please use only a sip of water to take your instructed medications  Avoid all over the counter vitamins, supplements and NSAIDS for one week prior to surgery per anesthesia guidelines  Tylenol is ok to take as needed  You will receive a call one business day prior to surgery with an arrival time and hospital directions  If your surgery is scheduled on a Monday, the hospital will be calling you on the Friday prior to your surgery  If you have not heard from anyone by 8pm, please call the hospital supervisor through the hospital  at [phone]  Lenore Vaz [phone])  Do not eat or drink anything after midnight the night before your surgery, including candy, mints, lifesavers, or chewing gum  Do not drink alcohol 24hrs before your surgery  Try not to smoke at least 24hrs before your surgery  Follow the pre surgery showering instructions as listed in the [facility] Surgical Experience Booklet” or otherwise provided by your surgeon's office  Do not shave the surgical area 24 hours before surgery  Do not apply any lotions, creams, including makeup, cologne, deodorant, or perfumes after showering on the day of your surgery  No contact lenses, eye make-up, or artificial eyelashes  Remove nail polish, including gel polish, and any artificial, gel, or acrylic nails if possible  Remove all jewelry including rings and body piercing jewelry  Wear causal clothing that is easy to take on and off   Consider your type of surgery  Keep any valuables, jewelry, piercings at home  Please bring any specially ordered equipment (sling, braces) if indicated  Arrange for a responsible person to drive you to and from the hospital on the day of your surgery  Visitor Guidelines discussed  Call the surgeon's office with any new illnesses, exposures, or additional questions prior to surgery  Please reference your [facility] Surgical Experience Booklet” for additional information to prepare for your upcoming surgery

## 2023-04-25 NOTE — H&P
HISTORY AND PHYSICAL  ? ? Patient Name: Zara Simon  Patient MRN: [id number]  Attending Provider: Jasmyn Salinas MD  Service: Urology  Chief Complaint    Kidney stone    HPI   Zara Simon is a 47 y o  male with bilateral kidney stones  I plan left ESWL  Potential risks and complications discussed, and informed consent was given by the patient  Medications  Meds/Allergies   No current facility-administered medications for this encounter  Cannot display prior to admission medications because the patient has not been admitted in this contact  No current facility-administered medications for this encounter      Current Outpatient Medications:   •  diltiazem (CARDIZEM CD) 180 mg 24 hr capsule, Take 1 capsule (180 mg total) by mouth daily, Disp: 90 capsule, Rfl: 1  •  fluticasone (FLONASE) 50 mcg/act nasal spray, 1 spray into each nostril daily PRN, Disp: , Rfl:   •  lansoprazole (PREVACID) 30 mg capsule, Take 1 capsule (30 mg total) by mouth as needed (LPR), Disp: 90 capsule, Rfl: 1  •  lisinopril-hydrochlorothiazide (PRINZIDE,ZESTORETIC) 20-25 MG per tablet, Take 1 tablet by mouth daily, Disp: 90 tablet, Rfl: 1  •  Multiple Vitamins-Minerals (multivitamin with minerals) tablet, Take 1 tablet by mouth daily, Disp: , Rfl:   Review of Systems  10 point review of systems negative except as noted in HPI  Allergies  No Known Allergies  PMH  Past Medical History:   Diagnosis Date   • CPAP (continuous positive airway pressure) dependence    • GERD (gastroesophageal reflux disease)    • Herpes zoster without complication 36/14/4671   • Hypertension    • Irregular heart beat     SVT   • Kidney stone    • Multinodular goiter 12/12/2018   • Seasonal allergies    • Sleep apnea      Past surgical history  Past Surgical History:   Procedure Laterality Date   • US GUIDED MSK PROCEDURE  4/23/2019   • US GUIDED THYROID BIOPSY  12/28/2018     Social history  Social History     Tobacco Use   • Smoking status: Never • Smokeless tobacco: Never   Vaping Use   • Vaping Use: Never used   Substance Use Topics   • Alcohol use: Yes     Alcohol/week: 1 0 standard drink     Types: 1 Cans of beer per week     Comment: Occasionally   • Drug use: No     ?  Physical Exam      Wt 125 kg (275 lb)   BMI 39 46 kg/m²   General appearance: alert and oriented, in no acute distress  Head: Normocephalic, without obvious abnormality, atraumatic  Neck: no JVD and supple, symmetrical, trachea midline  Back: symmetric, no curvature  ROM normal  No CVA tenderness    Lungs: clear to auscultation bilaterally  Heart: regular rate and rhythm  Abdomen: soft, non-tender; bowel sounds normal; no masses,  no organomegaly  Extremities: extremities normal, warm and well-perfused; no cyanosis, clubbing, or edema  Neurologic: Grossly normal  Em Breath, MD

## 2023-04-27 ENCOUNTER — ANESTHESIA (OUTPATIENT)
Dept: PERIOP | Facility: HOSPITAL | Age: 53
End: 2023-04-27

## 2023-04-27 ENCOUNTER — HOSPITAL ENCOUNTER (OUTPATIENT)
Facility: HOSPITAL | Age: 53
Setting detail: OUTPATIENT SURGERY
Discharge: HOME | End: 2023-04-27
Attending: UROLOGY | Admitting: UROLOGY

## 2023-04-27 ENCOUNTER — ANESTHESIA EVENT (OUTPATIENT)
Dept: PERIOP | Facility: HOSPITAL | Age: 53
End: 2023-04-27

## 2023-04-27 VITALS
TEMPERATURE: 97.7 F | DIASTOLIC BLOOD PRESSURE: 68 MMHG | RESPIRATION RATE: 20 BRPM | HEART RATE: 74 BPM | OXYGEN SATURATION: 95 % | WEIGHT: 275 LBS | BODY MASS INDEX: 39.46 KG/M2 | SYSTOLIC BLOOD PRESSURE: 117 MMHG

## 2023-04-27 DIAGNOSIS — N20.0 BILATERAL NEPHROLITHIASIS: Primary | ICD-10-CM

## 2023-04-27 RX ORDER — FENTANYL CITRATE 50 UG/ML
INJECTION, SOLUTION INTRAMUSCULAR; INTRAVENOUS AS NEEDED
Status: DISCONTINUED | OUTPATIENT
Start: 2023-04-27 | End: 2023-04-27

## 2023-04-27 RX ORDER — ROCURONIUM BROMIDE 10 MG/ML
INJECTION, SOLUTION INTRAVENOUS AS NEEDED
Status: DISCONTINUED | OUTPATIENT
Start: 2023-04-27 | End: 2023-04-27

## 2023-04-27 RX ORDER — MAGNESIUM HYDROXIDE 1200 MG/15ML
LIQUID ORAL AS NEEDED
Status: DISCONTINUED | OUTPATIENT
Start: 2023-04-27 | End: 2023-04-27 | Stop reason: HOSPADM

## 2023-04-27 RX ORDER — ACETAMINOPHEN 325 MG/1
650 TABLET ORAL EVERY 6 HOURS SCHEDULED
Status: DISCONTINUED | OUTPATIENT
Start: 2023-04-27 | End: 2023-04-27 | Stop reason: HOSPADM

## 2023-04-27 RX ORDER — ONDANSETRON 2 MG/ML
INJECTION INTRAMUSCULAR; INTRAVENOUS AS NEEDED
Status: DISCONTINUED | OUTPATIENT
Start: 2023-04-27 | End: 2023-04-27

## 2023-04-27 RX ORDER — KETOROLAC TROMETHAMINE 30 MG/ML
15 INJECTION, SOLUTION INTRAMUSCULAR; INTRAVENOUS EVERY 6 HOURS PRN
Status: DISCONTINUED | OUTPATIENT
Start: 2023-04-27 | End: 2023-04-27 | Stop reason: HOSPADM

## 2023-04-27 RX ORDER — MIDAZOLAM HYDROCHLORIDE 2 MG/2ML
INJECTION, SOLUTION INTRAMUSCULAR; INTRAVENOUS AS NEEDED
Status: DISCONTINUED | OUTPATIENT
Start: 2023-04-27 | End: 2023-04-27

## 2023-04-27 RX ORDER — OXYCODONE HYDROCHLORIDE 5 MG/1
5 TABLET ORAL EVERY 4 HOURS PRN
Status: DISCONTINUED | OUTPATIENT
Start: 2023-04-27 | End: 2023-04-27 | Stop reason: HOSPADM

## 2023-04-27 RX ORDER — FENTANYL CITRATE/PF 50 MCG/ML
25 SYRINGE (ML) INJECTION
Status: DISCONTINUED | OUTPATIENT
Start: 2023-04-27 | End: 2023-04-27 | Stop reason: HOSPADM

## 2023-04-27 RX ORDER — PROPOFOL 10 MG/ML
INJECTION, EMULSION INTRAVENOUS AS NEEDED
Status: DISCONTINUED | OUTPATIENT
Start: 2023-04-27 | End: 2023-04-27

## 2023-04-27 RX ORDER — HYDROMORPHONE HCL IN WATER/PF 6 MG/30 ML
0.2 PATIENT CONTROLLED ANALGESIA SYRINGE INTRAVENOUS
Status: DISCONTINUED | OUTPATIENT
Start: 2023-04-27 | End: 2023-04-27 | Stop reason: HOSPADM

## 2023-04-27 RX ORDER — DEXAMETHASONE SODIUM PHOSPHATE 10 MG/ML
INJECTION, SOLUTION INTRAMUSCULAR; INTRAVENOUS AS NEEDED
Status: DISCONTINUED | OUTPATIENT
Start: 2023-04-27 | End: 2023-04-27

## 2023-04-27 RX ORDER — SODIUM CHLORIDE, SODIUM LACTATE, POTASSIUM CHLORIDE, CALCIUM CHLORIDE 600; 310; 30; 20 MG/100ML; MG/100ML; MG/100ML; MG/100ML
INJECTION, SOLUTION INTRAVENOUS CONTINUOUS PRN
Status: DISCONTINUED | OUTPATIENT
Start: 2023-04-27 | End: 2023-04-27

## 2023-04-27 RX ORDER — ONDANSETRON 2 MG/ML
4 INJECTION INTRAMUSCULAR; INTRAVENOUS ONCE AS NEEDED
Status: DISCONTINUED | OUTPATIENT
Start: 2023-04-27 | End: 2023-04-27 | Stop reason: HOSPADM

## 2023-04-27 RX ADMIN — ROCURONIUM BROMIDE 40 MG: 10 INJECTION, SOLUTION INTRAVENOUS at 08:12

## 2023-04-27 RX ADMIN — MIDAZOLAM 2 MG: 1 INJECTION INTRAMUSCULAR; INTRAVENOUS at 08:01

## 2023-04-27 RX ADMIN — ONDANSETRON 4 MG: 2 INJECTION INTRAMUSCULAR; INTRAVENOUS at 08:22

## 2023-04-27 RX ADMIN — SODIUM CHLORIDE, SODIUM LACTATE, POTASSIUM CHLORIDE, AND CALCIUM CHLORIDE: .6; .31; .03; .02 INJECTION, SOLUTION INTRAVENOUS at 08:46

## 2023-04-27 RX ADMIN — SODIUM CHLORIDE, SODIUM LACTATE, POTASSIUM CHLORIDE, AND CALCIUM CHLORIDE: .6; .31; .03; .02 INJECTION, SOLUTION INTRAVENOUS at 08:01

## 2023-04-27 RX ADMIN — DEXAMETHASONE SODIUM PHOSPHATE 10 MG: 10 INJECTION, SOLUTION INTRAMUSCULAR; INTRAVENOUS at 08:22

## 2023-04-27 RX ADMIN — DEXTROSE MONOHYDRATE 3000 MG: 50 INJECTION, SOLUTION INTRAVENOUS at 08:17

## 2023-04-27 RX ADMIN — FENTANYL CITRATE 50 MCG: 50 INJECTION, SOLUTION INTRAMUSCULAR; INTRAVENOUS at 08:12

## 2023-04-27 RX ADMIN — FENTANYL CITRATE 50 MCG: 50 INJECTION, SOLUTION INTRAMUSCULAR; INTRAVENOUS at 08:43

## 2023-04-27 RX ADMIN — PROPOFOL 250 MG: 10 INJECTION, EMULSION INTRAVENOUS at 08:12

## 2023-04-27 RX ADMIN — SUGAMMADEX 200 MG: 100 INJECTION, SOLUTION INTRAVENOUS at 08:26

## 2023-04-27 NOTE — ANESTHESIA POSTPROCEDURE EVALUATION
Post-Op Assessment Note    CV Status:  Stable  Pain Score: 0    Pain management: adequate     Mental Status:  Alert and awake   Hydration Status:  Euvolemic   PONV Controlled:  Controlled   Airway Patency:  Patent      Post Op Vitals Reviewed: Yes      Staff: CRNA         No notable events documented      /83 (04/27/23 0857)    Temp 97 8 °F (36 6 °C) (04/27/23 0857)    Pulse 83 (04/27/23 0857)   Resp 16 (04/27/23 0857)    SpO2 98 % (04/27/23 0857)

## 2023-04-27 NOTE — ANESTHESIA PREPROCEDURE EVALUATION
Procedure:  ESWL (Left: Ureter)    Relevant Problems   CARDIO   (+) Essential hypertension   (+) Paroxysmal SVT (supraventricular tachycardia) (HCC)      GI/HEPATIC   (+) NAFL (nonalcoholic fatty liver)      /RENAL   (+) Bilateral nephrolithiasis      NEURO/PSYCH   (+) Tension headache      PULMONARY   (+) Obstructive sleep apnea        Physical Exam    Airway    Mallampati score: I  TM Distance: >3 FB  Neck ROM: full     Dental       Cardiovascular      Pulmonary      Other Findings        Anesthesia Plan  ASA Score- 3     Anesthesia Type- general with ASA Monitors  Additional Monitors:   Airway Plan: ETT  Plan Factors-Exercise tolerance (METS): >4 METS  Chart reviewed  Patient summary reviewed  Patient is not a current smoker  Patient did not smoke on day of surgery  Induction- intravenous  Postoperative Plan- Plan for postoperative opioid use  Planned trial extubation    Informed Consent- Anesthetic plan and risks discussed with patient  I personally reviewed this patient with the CRNA  Discussed and agreed on the Anesthesia Plan with the CRNA  Candida Hy

## 2023-04-27 NOTE — ANESTHESIA PREPROCEDURE EVALUATION
Procedure:  ESWL (Left: Ureter)    Relevant Problems   CARDIO   (+) Essential hypertension   (+) Paroxysmal SVT (supraventricular tachycardia) (HCC)      GI/HEPATIC   (+) NAFL (nonalcoholic fatty liver)      /RENAL   (+) Bilateral nephrolithiasis      NEURO/PSYCH   (+) Tension headache      PULMONARY   (+) Obstructive sleep apnea      Respiratory   (+) Allergic rhinitis   (+) Laryngopharyngeal reflux (LPR)      Other   (+) Morbid obesity (HCC)             Anesthesia Plan  ASA Score- 3     Anesthesia Type- general with ASA Monitors  Additional Monitors:   Airway Plan: LMA  Plan Factors-Exercise tolerance (METS): >4 METS  Chart reviewed  Patient summary reviewed  Induction- intravenous  Postoperative Plan- Plan for postoperative opioid use  Informed Consent- Anesthetic plan and risks discussed with patient  I personally reviewed this patient with the CRNA  Discussed and agreed on the Anesthesia Plan with the CRNA  Jenise Currie

## 2023-04-27 NOTE — DISCHARGE INSTR - AVS FIRST PAGE
Use Tylenol and ibuprofen for pain  Rest and drink plenty of fluids  Strain your urine and bring any collected stones to your office visit to be sent for analysis  No heavy lifting for 2 weeks  Call the office for fever, heavy urinary bleeding, severe pain not relieved by oral pain medications  Keep your May 25 appointment and get a KUB x-ray prior to that visit

## 2023-04-27 NOTE — INTERVAL H&P NOTE
H&P reviewed  After examining the patient I find no changes in the patients condition since the H&P had been written  Vitals:    04/27/23 0614   BP: 135/67   Pulse: 86   Resp: 18   Temp: 98 °F (36 7 °C)   SpO2: 96%   Procedure and risks reviewed in holding unit  Laterality marked-Left

## 2023-04-27 NOTE — NURSING NOTE
Pt is awake,alert,tolerated diet, written and verbal instructions given to pt, who verbalizes an understanding  Voided X2 400cc cranberry colored urine, second void for 325 cc clear pink urine  All urine was strained, no stones retrieved

## 2023-04-27 NOTE — OP NOTE
OPERATIVE REPORT  PATIENT NAME: Memo London    :  1970  MRN: [id number]  Pt Location:  OR ROOM 10    SURGERY DATE: 2023    Surgeon(s) and Role:     * James Villarreal MD - Primary    Preop Diagnosis:  Bilateral nephrolithiasis [N20 0]    Post-Op Diagnosis Codes:     * Left renal stone [N20 0]    Procedure(s):  Left - ESWL    Specimen(s):  * No specimens in log *    Estimated Blood Loss:   Minimal    Drains:  * No LDAs found *    Anesthesia Type:   General    Operative Indications:  Left kidney stone      Operative Findings: The stone was well visualized fluoroscopically  ESWL  was performed with a maximum power of 7   3000 shocks were administered  Cesar Davenportand was seen to spread out  Complications:   None    Procedure and Technique:  The patient was brought to the operating room properly identified, and placed on the lithotripsy table  A preliminary fluoroscopic image was obtained and the stone visualized  General anesthesia was administered  Compression boots were then employed  Intravenous antibiotic administered  An appropriate time-out performed  The stone was then visualized utilizing multiplanar fluoroscopy and placed in the focal zone of the Lithotripter  Extracorporeal shockwave lithotripsy then commenced at a power setting of 1  The power was gradually raised to a maximum of 7  Fluoroscopy was performed intermittently to ensure the stone remained in the focal zone of the Lithotripter  The stone was seen to spread out nicely  When the maximum number of shocks (3000) was administered the procedure was terminated  The patient tolerated the procedure well  The patient was awakened from anesthesia and taken to the recovery room in satisfactory condition  I was present for the entire procedure      Patient Disposition:  PACU  and hemodynamically stable        SIGNATURE: James Villarreal MD  DATE: 2023  TIME: 9:03 AM

## 2023-05-20 ENCOUNTER — APPOINTMENT (OUTPATIENT)
Dept: RADIOLOGY | Age: 53
End: 2023-05-20

## 2023-05-20 DIAGNOSIS — N20.0 BILATERAL NEPHROLITHIASIS: ICD-10-CM

## 2023-05-25 ENCOUNTER — OFFICE VISIT (OUTPATIENT)
Dept: UROLOGY | Facility: CLINIC | Age: 53
End: 2023-05-25

## 2023-05-25 VITALS
SYSTOLIC BLOOD PRESSURE: 140 MMHG | WEIGHT: 279 LBS | HEIGHT: 70 IN | DIASTOLIC BLOOD PRESSURE: 80 MMHG | OXYGEN SATURATION: 95 % | HEART RATE: 83 BPM | BODY MASS INDEX: 39.94 KG/M2

## 2023-05-25 DIAGNOSIS — N20.0 BILATERAL NEPHROLITHIASIS: Primary | ICD-10-CM

## 2023-05-25 NOTE — PROGRESS NOTES
1  Bilateral nephrolithiasis  Ambulatory Referral to Nephrology    XR abdomen 1 view kub    Stone analysis            Assessment and plan:       1  Bilateral intrarenal calculi on ultrasound  -Asymptomatic  - no significant improvement with recent ESWL  -Discussed options with proceeding with ureteroscopy versus observation  Patient prefers to continue to observe  Follow-up 1 year KUB prior  -He will be referred to nephrology for metabolic work-up  2   History of recurrent nephrolithiasis  -He was a treated with shockwave lithotripsy approximate 10 years ago        [id number]      Chief Complaint     Stone follow up    History of Present Illness     Colleen Goode is a 46 y o  male presenting for follow up  Hx of stones  Recent CT showing b/l nonobstructing stones: up to 5mm in the right, 8mm left midpole stone  Radiolucent on KUB  Recent left ESWL for 2023  Follow-up KUB unfortunately shows not much change in stone burden  Patient does bring a collection of stones that he passed  He remains asymptomatic  Medical comorbidities include goiter, YELENA, HTN, obesity  Not on anticoagulation  Review of Systems     Review of Systems   Constitutional: Negative for activity change and fatigue  HENT: Negative for congestion  Eyes: Negative for visual disturbance  Respiratory: Negative for shortness of breath and wheezing  Cardiovascular: Negative for chest pain and leg swelling  Gastrointestinal: Negative for abdominal pain  Endocrine: Negative for polyuria  Genitourinary: Negative for dysuria, flank pain, hematuria and urgency  Musculoskeletal: Negative for back pain  Allergic/Immunologic: Negative for immunocompromised state  Neurological: Negative for dizziness and numbness  Psychiatric/Behavioral: Negative for dysphoric mood  All other systems reviewed and are negative      AUA SYMPTOM SCORE    Flowsheet Row Most Recent Value   AUA SYMPTOM SCORE    How "often have you had a sensation of not emptying your bladder completely after you finished urinating? 0 (P)     How often have you had to urinate again less than two hours after you finished urinating? 2 (P)     How often have you found you stopped and started again several times when you urinate? 0 (P)     How often have you found it difficult to postpone urination? 2 (P)     How often have you had a weak urinary stream? 1 (P)     How often have you had to push or strain to begin urination? 0 (P)     How many times did you most typically get up to urinate from the time you went to bed at night until the time you got up in the morning? 5 (P)     Quality of Life: If you were to spend the rest of your life with your urinary condition just the way it is now, how would you feel about that? 3 (P)     AUA SYMPTOM SCORE 10 (P)             Allergies     No Known Allergies    Physical Exam       Physical Exam  Constitutional:       General: He is not in acute distress  Appearance: He is well-developed  HENT:      Head: Normocephalic and atraumatic  Pulmonary:      Effort: Pulmonary effort is normal       Breath sounds: Normal breath sounds  Abdominal:      Palpations: Abdomen is soft  Musculoskeletal:         General: Normal range of motion  Cervical back: Normal range of motion  Skin:     General: Skin is warm  Neurological:      Mental Status: He is alert and oriented to person, place, and time     Psychiatric:         Behavior: Behavior normal            Vital Signs  Vitals:    05/25/23 1455   BP: 140/80   BP Location: Left arm   Patient Position: Sitting   Cuff Size: Standard   Pulse: 83   SpO2: 95%   Weight: 127 kg (279 lb)   Height: 5' 10\" (1 778 m)         Current Medications       Current Outpatient Medications:   •  diltiazem (CARDIZEM CD) 180 mg 24 hr capsule, Take 1 capsule (180 mg total) by mouth daily, Disp: 90 capsule, Rfl: 1  •  fluticasone (FLONASE) 50 mcg/act nasal spray, 1 spray into each " nostril daily PRN, Disp: , Rfl:   •  lansoprazole (PREVACID) 30 mg capsule, Take 1 capsule (30 mg total) by mouth as needed (LPR), Disp: 90 capsule, Rfl: 1  •  lisinopril-hydrochlorothiazide (PRINZIDE,ZESTORETIC) 20-25 MG per tablet, Take 1 tablet by mouth daily, Disp: 90 tablet, Rfl: 1  •  Multiple Vitamins-Minerals (multivitamin with minerals) tablet, Take 1 tablet by mouth daily, Disp: , Rfl:       Active Problems     Patient Active Problem List   Diagnosis   • Allergic rhinitis   • Bilateral nephrolithiasis   • Essential hypertension   • Morbid obesity ([facility] [address] )   • Obstructive sleep apnea   • Paroxysmal SVT (supraventricular tachycardia) (HCC)   • Annual physical exam   • Multinodular goiter   • Synovial cyst of right popliteal space   • Laryngopharyngeal reflux (LPR)   • Tension headache   • Impaired fasting blood sugar   • Elevated liver enzymes   • Calculus of gallbladder without cholecystitis without obstruction   • NAFL (nonalcoholic fatty liver)         Past Medical History     Past Medical History:   Diagnosis Date   • CPAP (continuous positive airway pressure) dependence    • GERD (gastroesophageal reflux disease)    • Herpes zoster without complication 73/99/8769   • Hypertension    • Irregular heart beat     SVT   • Kidney stone    • Multinodular goiter 12/12/2018   • Seasonal allergies    • Sleep apnea          Surgical History     Past Surgical History:   Procedure Laterality Date   • DC LITHOTRIPSY XTRCORP SHOCK WAVE Left 4/27/2023    Procedure: ESWL;  Surgeon: Chris Singh MD;  Location: [address] [place] [address];  Service: Urology   • US GUIDED MSK PROCEDURE  4/23/2019   • Jorge Alberto Hall4 THYROID BIOPSY  12/28/2018         Family History     Family History   Problem Relation Age of Onset   • Hyperlipidemia Mother    • Heart block Mother    • Stroke Mother    • Hypertension Mother    • Kidney nephrosis Father    • Stroke Maternal Grandmother    • No Known Problems Maternal Grandfather    • Cancer Paternal Grandmother "  • Heart disease Paternal Grandfather          Social History     Social History     Social History     Tobacco Use   Smoking Status Never   Smokeless Tobacco Never         Pertinent Lab Values     Lab Results   Component Value Date    CREATININE 0 90 04/15/2023       Pertinent Imaging     FINDINGS:     PANCREAS:  Obscured by bowel gas      AORTA AND IVC:  Visualized portions are normal for patient age      LIVER:  Size:  Moderately enlarged  The liver measures 19 2 cm in the midclavicular line  Contour:  Surface contour is smooth  Parenchyma: There is moderate diffuse increased echogenicity with smooth echotexture and acoustic beam attenuation  Most consistent with moderate hepatic steatosis  No liver mass identified  Limited imaging of the main portal vein shows it to be patent and hepatopetal      BILIARY:  The gallbladder is normal in caliber  No wall thickening or pericholecystic fluid  Shadowing gallstone(s) identified  No sonographic Parker's sign  No intrahepatic biliary dilatation  CBD measures 4 0 mm  No choledocholithiasis      KIDNEY:   Right kidney measures 11 3 x 4 8 x 5 9  cm  Volume 166 8 mL  5 mm nonobstructing the calculus in the interpolar region with 10 mm calculus in the lower pole  Left kidney measures 12 8 x 7 3 x 6 6 cm  Volume 323 1 mL  10 mm nonobstructing calculus in the interpolar region  SPLEEN:   Measures 11 2 x 10 7 x 3 8 cm  Volume 234 8 mL  Within normal limits      ASCITES:  None      IMPRESSION:     Hepatomegaly and hepatic steatosis  Cholelithiasis  Nephrolithiasis  Portions of the record may have been created with voice recognition software  Occasional wrong word or \"sound a like\" substitutions may have occurred due to the inherent limitations of voice recognition software  Read the chart carefully and recognize, using context, where substitutions have occurred    "

## 2023-06-02 LAB
COLOR STONE: NORMAL
COM MFR STONE: 100 %
COMMENT-STONE3: NORMAL
COMPOSITION: NORMAL
LABORATORY COMMENT REPORT: NORMAL
PHOTO: NORMAL
SIZE STONE: NORMAL MM
SPEC SOURCE SUBJ: NORMAL
STONE ANALYSIS-IMP: NORMAL
WT STONE: 19 MG

## 2023-06-06 ENCOUNTER — TELEPHONE (OUTPATIENT)
Dept: NEPHROLOGY | Facility: CLINIC | Age: 53
End: 2023-06-06

## 2023-06-06 ENCOUNTER — APPOINTMENT (OUTPATIENT)
Dept: LAB | Age: 53
End: 2023-06-06
Payer: COMMERCIAL

## 2023-06-06 DIAGNOSIS — R73.01 IMPAIRED FASTING BLOOD SUGAR: ICD-10-CM

## 2023-06-06 DIAGNOSIS — Z12.5 PROSTATE CANCER SCREENING: ICD-10-CM

## 2023-06-06 DIAGNOSIS — E04.2 MULTINODULAR GOITER: ICD-10-CM

## 2023-06-06 DIAGNOSIS — K76.0 NAFL (NONALCOHOLIC FATTY LIVER): ICD-10-CM

## 2023-06-06 DIAGNOSIS — I10 ESSENTIAL HYPERTENSION: ICD-10-CM

## 2023-06-06 DIAGNOSIS — Z12.12 ENCOUNTER FOR COLORECTAL CANCER SCREENING: ICD-10-CM

## 2023-06-06 DIAGNOSIS — K80.20 CALCULUS OF GALLBLADDER WITHOUT CHOLECYSTITIS WITHOUT OBSTRUCTION: ICD-10-CM

## 2023-06-06 DIAGNOSIS — Z12.11 ENCOUNTER FOR COLORECTAL CANCER SCREENING: ICD-10-CM

## 2023-06-06 LAB
ALBUMIN SERPL BCP-MCNC: 3.8 G/DL (ref 3.5–5)
ALP SERPL-CCNC: 72 U/L (ref 46–116)
ALT SERPL W P-5'-P-CCNC: 60 U/L (ref 12–78)
ANION GAP SERPL CALCULATED.3IONS-SCNC: 4 MMOL/L (ref 4–13)
AST SERPL W P-5'-P-CCNC: 26 U/L (ref 5–45)
BILIRUB SERPL-MCNC: 0.75 MG/DL (ref 0.2–1)
BUN SERPL-MCNC: 16 MG/DL (ref 5–25)
CALCIUM SERPL-MCNC: 9.4 MG/DL (ref 8.3–10.1)
CHLORIDE SERPL-SCNC: 107 MMOL/L (ref 96–108)
CHOLEST SERPL-MCNC: 145 MG/DL
CO2 SERPL-SCNC: 27 MMOL/L (ref 21–32)
CREAT SERPL-MCNC: 1.04 MG/DL (ref 0.6–1.3)
CREAT UR-MCNC: 163 MG/DL
ERYTHROCYTE [DISTWIDTH] IN BLOOD BY AUTOMATED COUNT: 13.1 % (ref 11.6–15.1)
EST. AVERAGE GLUCOSE BLD GHB EST-MCNC: 117 MG/DL
GFR SERPL CREATININE-BSD FRML MDRD: 81 ML/MIN/1.73SQ M
GLUCOSE P FAST SERPL-MCNC: 117 MG/DL (ref 65–99)
HBA1C MFR BLD: 5.7 %
HCT VFR BLD AUTO: 43.8 % (ref 36.5–49.3)
HDLC SERPL-MCNC: 47 MG/DL
HEMOCCULT STL QL IA: NEGATIVE
HGB BLD-MCNC: 14.7 G/DL (ref 12–17)
LDLC SERPL CALC-MCNC: 88 MG/DL (ref 0–100)
MCH RBC QN AUTO: 29.9 PG (ref 26.8–34.3)
MCHC RBC AUTO-ENTMCNC: 33.6 G/DL (ref 31.4–37.4)
MCV RBC AUTO: 89 FL (ref 82–98)
MICROALBUMIN UR-MCNC: 34.8 MG/L (ref 0–20)
MICROALBUMIN/CREAT 24H UR: 21 MG/G CREATININE (ref 0–30)
NONHDLC SERPL-MCNC: 98 MG/DL
PLATELET # BLD AUTO: 281 THOUSANDS/UL (ref 149–390)
PMV BLD AUTO: 9.7 FL (ref 8.9–12.7)
POTASSIUM SERPL-SCNC: 3.9 MMOL/L (ref 3.5–5.3)
PROT SERPL-MCNC: 7.3 G/DL (ref 6.4–8.4)
PSA SERPL-MCNC: 1.32 NG/ML (ref 0–4)
RBC # BLD AUTO: 4.91 MILLION/UL (ref 3.88–5.62)
SODIUM SERPL-SCNC: 138 MMOL/L (ref 135–147)
TRIGL SERPL-MCNC: 48 MG/DL
TSH SERPL DL<=0.05 MIU/L-ACNC: 1.48 UIU/ML (ref 0.45–4.5)
WBC # BLD AUTO: 7.44 THOUSAND/UL (ref 4.31–10.16)

## 2023-06-06 PROCEDURE — G0328 FECAL BLOOD SCRN IMMUNOASSAY: HCPCS

## 2023-06-06 PROCEDURE — 82043 UR ALBUMIN QUANTITATIVE: CPT

## 2023-06-06 PROCEDURE — 80053 COMPREHEN METABOLIC PANEL: CPT

## 2023-06-06 PROCEDURE — 85027 COMPLETE CBC AUTOMATED: CPT

## 2023-06-06 PROCEDURE — 82570 ASSAY OF URINE CREATININE: CPT

## 2023-06-06 PROCEDURE — 80061 LIPID PANEL: CPT

## 2023-06-06 PROCEDURE — 84443 ASSAY THYROID STIM HORMONE: CPT

## 2023-06-06 PROCEDURE — 83036 HEMOGLOBIN GLYCOSYLATED A1C: CPT

## 2023-06-06 PROCEDURE — G0103 PSA SCREENING: HCPCS

## 2023-06-06 PROCEDURE — 36415 COLL VENOUS BLD VENIPUNCTURE: CPT

## 2023-06-06 NOTE — TELEPHONE ENCOUNTER
New Patient Intake Form   Patient Details   Omid Mcghee     1970     [id number]     Insurance Information   Name of [address]   Does the patient need an insurance referral? no   If patient has Ori [place], please ask if they will be using their Ori Longoria  Appointment Information   Who is calling to schedule? If not patient, what is callers name? Patient   Referring Provider  Mike Astudillo   Reason for Appt (Diagnosis) Bilateral Nephrolithiasis   Does Patient have labs/urine done at [facility]? If not, where do they go? List the date of last lab / urine  *Please try to get labs 2 years back if not at [facility] Yes 6/6   Has patient been hospitalized recently? If yes, list name and location of hospital they were in no   Has patient been seen by a Nephrologist before? If yes, list name, location and phone number no   Has the patient had renal imaging done? If so, list the most recent date and type of imaging yes    Does patient have a history of Kidney Stones? yes   Appointment Details   Is there a referral on file?  yes    Appointment Date 7/19/2023   Location  [facility]   Miscellaneous

## 2023-06-13 ENCOUNTER — OFFICE VISIT (OUTPATIENT)
Dept: INTERNAL MEDICINE CLINIC | Facility: OTHER | Age: 53
End: 2023-06-13
Payer: COMMERCIAL

## 2023-06-13 VITALS
HEIGHT: 70 IN | BODY MASS INDEX: 40.23 KG/M2 | DIASTOLIC BLOOD PRESSURE: 60 MMHG | WEIGHT: 281 LBS | SYSTOLIC BLOOD PRESSURE: 110 MMHG | HEART RATE: 78 BPM | TEMPERATURE: 97.4 F | OXYGEN SATURATION: 96 %

## 2023-06-13 DIAGNOSIS — N20.0 BILATERAL NEPHROLITHIASIS: ICD-10-CM

## 2023-06-13 DIAGNOSIS — N20.0 CALCIUM OXALATE STONES: ICD-10-CM

## 2023-06-13 DIAGNOSIS — E04.2 MULTINODULAR GOITER: ICD-10-CM

## 2023-06-13 DIAGNOSIS — K21.9 LARYNGOPHARYNGEAL REFLUX (LPR): ICD-10-CM

## 2023-06-13 DIAGNOSIS — K76.0 NAFL (NONALCOHOLIC FATTY LIVER): ICD-10-CM

## 2023-06-13 DIAGNOSIS — R73.01 IMPAIRED FASTING BLOOD SUGAR: ICD-10-CM

## 2023-06-13 DIAGNOSIS — E66.01 MORBID OBESITY (HCC): ICD-10-CM

## 2023-06-13 DIAGNOSIS — I10 ESSENTIAL HYPERTENSION: Primary | ICD-10-CM

## 2023-06-13 DIAGNOSIS — I47.1 PAROXYSMAL SVT (SUPRAVENTRICULAR TACHYCARDIA) (HCC): ICD-10-CM

## 2023-06-13 PROBLEM — R74.8 ELEVATED LIVER ENZYMES: Status: RESOLVED | Noted: 2022-10-20 | Resolved: 2023-06-13

## 2023-06-13 PROCEDURE — 99214 OFFICE O/P EST MOD 30 MIN: CPT | Performed by: INTERNAL MEDICINE

## 2023-06-13 RX ORDER — LANSOPRAZOLE 30 MG/1
30 CAPSULE, DELAYED RELEASE ORAL AS NEEDED
Qty: 90 CAPSULE | Refills: 1 | Status: SHIPPED | OUTPATIENT
Start: 2023-06-13

## 2023-06-13 RX ORDER — LISINOPRIL AND HYDROCHLOROTHIAZIDE 25; 20 MG/1; MG/1
1 TABLET ORAL DAILY
Qty: 90 TABLET | Refills: 1 | Status: SHIPPED | OUTPATIENT
Start: 2023-06-13

## 2023-06-13 RX ORDER — DILTIAZEM HYDROCHLORIDE 180 MG/1
180 CAPSULE, COATED, EXTENDED RELEASE ORAL DAILY
Qty: 90 CAPSULE | Refills: 1 | Status: SHIPPED | OUTPATIENT
Start: 2023-06-13

## 2023-06-13 NOTE — ASSESSMENT & PLAN NOTE
Continue follow-up with urology  He has an appointment with nephrology regarding calcium oxalate stones  Discussed dietary modifications  He does not have any gastrointestinal symptoms suspicious for inflammatory bowel disease  Discussed adequate oral hydration  He is not taking any herbal supplements

## 2023-06-13 NOTE — PROGRESS NOTES
Assessment/Plan:    NAFL (nonalcoholic fatty liver)  Continue to trend LFTs  Discussed diet and exercise  Multinodular goiter  Continue to trend TSH, follow-up with endocrinology  Impaired fasting blood sugar  Discussed diet and exercise  Trend A1c  Essential hypertension  Controlled, continue current antihypertensive regimen  Bilateral nephrolithiasis  Continue follow-up with urology  He has an appointment with nephrology regarding calcium oxalate stones  Discussed dietary modifications  He does not have any gastrointestinal symptoms suspicious for inflammatory bowel disease  Discussed adequate oral hydration  He is not taking any herbal supplements  Diagnoses and all orders for this visit:    Essential hypertension  -     lisinopril-hydrochlorothiazide (PRINZIDE,ZESTORETIC) 20-25 MG per tablet; Take 1 tablet by mouth daily    Laryngopharyngeal reflux (LPR)  -     lansoprazole (PREVACID) 30 mg capsule; Take 1 capsule (30 mg total) by mouth as needed (LPR)    Paroxysmal SVT (supraventricular tachycardia) (HCC)  -     diltiazem (CARDIZEM CD) 180 mg 24 hr capsule; Take 1 capsule (180 mg total) by mouth daily    Calcium oxalate stones  -     PTH, intact; Future    NAFL (nonalcoholic fatty liver)    Multinodular goiter    Impaired fasting blood sugar    Bilateral nephrolithiasis    Morbid obesity (HCC)          BMI Counseling: Body mass index is 40 32 kg/m²  The BMI is above normal  Nutrition recommendations include decreasing portion sizes, encouraging healthy choices of fruits and vegetables, decreasing fast food intake, consuming healthier snacks, limiting drinks that contain sugar, moderation in carbohydrate intake, increasing intake of lean protein, reducing intake of saturated and trans fat and reducing intake of cholesterol  Exercise recommendations include moderate physical activity 150 minutes/week and exercising 3-5 times per week  No pharmacotherapy was ordered   Patient referred to PCP  Rationale for BMI follow-up plan is due to patient being overweight or obese  Depression Screening and Follow-up Plan: Patient was screened for depression during today's encounter  They screened negative with a PHQ-2 score of 0  Subjective:      Patient ID: Mingo Holt is a 46 y o  male  Chief Complaint   Patient presents with   • Follow-up     6M follow up  Review labs  28-year-old male seen today for follow-up of chronic conditions  Recent operatory studies reviewed today  He has been seeing urology regarding bilateral nephrolithiasis  He was given a referral to nephrology for further evaluation of calcium oxalate stones  In reviewing his diet, he follows a regular diet and does not supplement additional protein  He does not take any herbal supplements  He denies any symptoms suggestive of inflammatory bowel disease  Otherwise, he has no other complaints or concerns at this time  He has been compliant with his medication regimen  Hypertension  This is a chronic problem  The current episode started more than 1 year ago  The problem is unchanged  The problem is controlled  Pertinent negatives include no chest pain, headaches, palpitations or shortness of breath  Past treatments include diuretics, calcium channel blockers and ACE inhibitors  The current treatment provides moderate improvement  There are no compliance problems  Heartburn  He reports no abdominal pain, no chest pain, no choking, no coughing, no nausea, no sore throat or no wheezing  This is a chronic problem  The current episode started more than 1 year ago  The problem occurs occasionally  The problem has been unchanged  The symptoms are aggravated by certain foods  Pertinent negatives include no fatigue  He has tried a PPI for the symptoms  The treatment provided moderate relief         The following portions of the patient's history were reviewed and updated as appropriate: allergies, current medications, past family history, past medical history, past social history, past surgical history and problem list     Review of Systems   Constitutional: Negative for activity change, appetite change, chills, diaphoresis, fatigue and fever  HENT: Negative for congestion, postnasal drip, rhinorrhea, sinus pressure, sinus pain, sneezing and sore throat  Eyes: Negative for visual disturbance  Respiratory: Negative for apnea, cough, choking, chest tightness, shortness of breath and wheezing  Cardiovascular: Negative for chest pain, palpitations and leg swelling  Gastrointestinal: Negative for abdominal distention, abdominal pain, anal bleeding, blood in stool, constipation, diarrhea, nausea and vomiting  Endocrine: Negative for cold intolerance and heat intolerance  Genitourinary: Negative for difficulty urinating, dysuria and hematuria  Musculoskeletal: Negative  Skin: Negative  Neurological: Negative for dizziness, weakness, light-headedness, numbness and headaches  Hematological: Negative for adenopathy  Psychiatric/Behavioral: Negative for agitation, sleep disturbance and suicidal ideas  All other systems reviewed and are negative          Past Medical History:   Diagnosis Date   • CPAP (continuous positive airway pressure) dependence    • GERD (gastroesophageal reflux disease)    • Herpes zoster without complication 73/92/9019   • Hypertension    • Irregular heart beat     SVT   • Kidney stone    • Multinodular goiter 12/12/2018   • Seasonal allergies    • Sleep apnea          Current Outpatient Medications:   •  diltiazem (CARDIZEM CD) 180 mg 24 hr capsule, Take 1 capsule (180 mg total) by mouth daily, Disp: 90 capsule, Rfl: 1  •  fluticasone (FLONASE) 50 mcg/act nasal spray, 1 spray into each nostril daily PRN, Disp: , Rfl:   •  lansoprazole (PREVACID) 30 mg capsule, Take 1 capsule (30 mg total) by mouth as needed (LPR), Disp: 90 capsule, Rfl: 1  •  lisinopril-hydrochlorothiazide "(PRINZIDE,ZESTORETIC) 20-25 MG per tablet, Take 1 tablet by mouth daily, Disp: 90 tablet, Rfl: 1  •  Multiple Vitamins-Minerals (multivitamin with minerals) tablet, Take 1 tablet by mouth daily, Disp: , Rfl:     No Known Allergies    Social History   Past Surgical History:   Procedure Laterality Date   • MI LITHOTRIPSY XTRCORP SHOCK WAVE Left 4/27/2023    Procedure: ESWL;  Surgeon: Owen Boss MD;  Location: [address] [place] [address];  Service: Urology   • Jorge Alberto 634 MSK PROCEDURE  4/23/2019   • US GUIDED THYROID BIOPSY  12/28/2018     Family History   Problem Relation Age of Onset   • Hyperlipidemia Mother    • Heart block Mother    • Stroke Mother    • Hypertension Mother    • Kidney nephrosis Father    • Stroke Maternal Grandmother    • No Known Problems Maternal Grandfather    • Cancer Paternal Grandmother    • Heart disease Paternal Grandfather        Objective:  /60 (BP Location: Left arm, Patient Position: Sitting, Cuff Size: Large)   Pulse 78   Temp (!) 97 4 °F (36 3 °C) (Tympanic)   Ht 5' 10\" (1 778 m)   Wt 127 kg (281 lb)   SpO2 96% Comment: ra  BMI 40 32 kg/m²     Recent Results (from the past 1344 hour(s))   Stone analysis    Collection Time: 05/25/23  3:41 PM   Result Value Ref Range    COLOR Brown     Size 3x4 mm    Stone Weight 19 mg    Composition Comment     Ca Oxalate,Monohydr  100 %    STONE COMMENT Comment     Please note Comment     Disclaimer Comment     Photo Comment     Stone Source Comment    CBC    Collection Time: 06/06/23  7:07 AM   Result Value Ref Range    WBC 7 44 4 31 - 10 16 Thousand/uL    RBC 4 91 3 88 - 5 62 Million/uL    Hemoglobin 14 7 12 0 - 17 0 g/dL    Hematocrit 43 8 36 5 - 49 3 %    MCV 89 82 - 98 fL    MCH 29 9 26 8 - 34 3 pg    MCHC 33 6 31 4 - 37 4 g/dL    RDW 13 1 11 6 - 15 1 %    Platelets 100 806 - 686 Thousands/uL    MPV 9 7 8 9 - 12 7 fL   Comprehensive metabolic panel    Collection Time: 06/06/23  7:07 AM   Result Value Ref Range    Sodium 138 135 - 147 mmol/L    " Potassium 3 9 3 5 - 5 3 mmol/L    Chloride 107 96 - 108 mmol/L    CO2 27 21 - 32 mmol/L    ANION GAP 4 4 - 13 mmol/L    BUN 16 5 - 25 mg/dL    Creatinine 1 04 0 60 - 1 30 mg/dL    Glucose, Fasting 117 (H) 65 - 99 mg/dL    Calcium 9 4 8 3 - 10 1 mg/dL    AST 26 5 - 45 U/L    ALT 60 12 - 78 U/L    Alkaline Phosphatase 72 46 - 116 U/L    Total Protein 7 3 6 4 - 8 4 g/dL    Albumin 3 8 3 5 - 5 0 g/dL    Total Bilirubin 0 75 0 20 - 1 00 mg/dL    eGFR 81 ml/min/1 73sq m   Hemoglobin A1C    Collection Time: 06/06/23  7:07 AM   Result Value Ref Range    Hemoglobin A1C 5 7 (H) Normal 3 8-5 6%; PreDiabetic 5 7-6 4%; Diabetic >=6 5%; Glycemic control for adults with diabetes <7 0% %     mg/dl   Lipid panel    Collection Time: 06/06/23  7:07 AM   Result Value Ref Range    Cholesterol 145 See Comment mg/dL    Triglycerides 48 See Comment mg/dL    HDL, Direct 47 >=40 mg/dL    LDL Calculated 88 0 - 100 mg/dL    Non-HDL-Chol (CHOL-HDL) 98 mg/dl   Microalbumin / creatinine urine ratio    Collection Time: 06/06/23  7:07 AM   Result Value Ref Range    Creatinine, Ur 163 0 mg/dL    Albumin,U,Random 34 8 (H) 0 0 - 20 0 mg/L    Albumin Creat Ratio 21 0 - 30 mg/g creatinine   PSA, Total Screen    Collection Time: 06/06/23  7:07 AM   Result Value Ref Range    PSA 1 32 0 00 - 4 00 ng/mL   TSH, 3rd generation with Free T4 reflex    Collection Time: 06/06/23  7:07 AM   Result Value Ref Range    TSH 3RD GENERATON 1 483 0 450 - 4 500 uIU/mL   Occult Blood, Fecal Immunochemical    Collection Time: 06/06/23  7:07 AM   Result Value Ref Range    OCCULT BLD, FECAL IMMUNOLOGICAL Negative Negative            Physical Exam  Vitals and nursing note reviewed  Constitutional:       General: He is not in acute distress  Appearance: He is well-developed  He is not diaphoretic  HENT:      Head: Normocephalic and atraumatic  Eyes:      General: No scleral icterus  Right eye: No discharge  Left eye: No discharge  Conjunctiva/sclera: Conjunctivae normal       Pupils: Pupils are equal, round, and reactive to light  Neck:      Thyroid: No thyromegaly  Vascular: No JVD  Cardiovascular:      Rate and Rhythm: Normal rate and regular rhythm  Heart sounds: Normal heart sounds  No murmur heard  No friction rub  No gallop  Pulmonary:      Effort: Pulmonary effort is normal  No respiratory distress  Breath sounds: Normal breath sounds  No wheezing or rales  Chest:      Chest wall: No tenderness  Abdominal:      General: Bowel sounds are normal  There is no distension  Palpations: Abdomen is soft  There is no mass  Tenderness: There is no abdominal tenderness  There is no guarding or rebound  Musculoskeletal:         General: No tenderness or deformity  Normal range of motion  Cervical back: Normal range of motion and neck supple  Lymphadenopathy:      Cervical: No cervical adenopathy  Skin:     General: Skin is warm and dry  Coloration: Skin is not pale  Findings: No erythema or rash  Neurological:      Mental Status: He is alert and oriented to person, place, and time  Cranial Nerves: No cranial nerve deficit  Coordination: Coordination normal       Deep Tendon Reflexes: Reflexes are normal and symmetric  Psychiatric:         Behavior: Behavior normal          Thought Content:  Thought content normal          Judgment: Judgment normal

## 2023-07-19 ENCOUNTER — CONSULT (OUTPATIENT)
Dept: NEPHROLOGY | Facility: CLINIC | Age: 53
End: 2023-07-19
Payer: COMMERCIAL

## 2023-07-19 VITALS
DIASTOLIC BLOOD PRESSURE: 64 MMHG | WEIGHT: 282 LBS | SYSTOLIC BLOOD PRESSURE: 124 MMHG | BODY MASS INDEX: 40.37 KG/M2 | HEIGHT: 70 IN

## 2023-07-19 DIAGNOSIS — E04.2 MULTINODULAR GOITER: ICD-10-CM

## 2023-07-19 DIAGNOSIS — N20.0 BILATERAL NEPHROLITHIASIS: Primary | ICD-10-CM

## 2023-07-19 DIAGNOSIS — I10 ESSENTIAL HYPERTENSION: ICD-10-CM

## 2023-07-19 DIAGNOSIS — N18.2 CKD (CHRONIC KIDNEY DISEASE) STAGE 2, GFR 60-89 ML/MIN: ICD-10-CM

## 2023-07-19 DIAGNOSIS — E66.01 MORBID OBESITY (HCC): ICD-10-CM

## 2023-07-19 PROCEDURE — 99243 OFF/OP CNSLTJ NEW/EST LOW 30: CPT | Performed by: INTERNAL MEDICINE

## 2023-07-19 NOTE — PATIENT INSTRUCTIONS
- get 24 hr urine test done then start following the diet below  - get blood work done after the visit    - Please call me in 10 days after having your blood work done to review the results if you do not hear back from me or my office, as I may have not received the results. - please remember to perform blood work prior to the next visit. - Please call if the blood pressure top number is greater than 140 or less than 110 consistently. - Please call if you are gaining more than 2lbs in 2 days for adjustment of water pills.  ~ Please AVOID the following pain medications. LIST OF NSAIDS (NONSTEROIDAL ANTI-INFLAMMATORY DRUGS) AND CARVALHO-2 INHIBITORS    DIFLUNISAL (DOLOBID)  IBUPROFEN (MOTRIN, ADVIL)  FLURBIPROFEN (ANSAID)  KETOPROFEN (ORUDIS, ORUVAIL)  FENOPROFEN (NALFON)  NABUMETONE (RELAFEN)  PIROXICAM (FELDENE)  NAPROXEN (ALEVE, NAPROSYN, NAPRELAN, ANAPROX)  DICLOFENAC (VOLTAREN, CATAFLAM)  INDOMETHACIN (INDOCIN)  SULINDAC (CLINORIL)  TOLMETIN (TOLETIN)  ETODOLAC (LODINE)  MELOXICAM (MOBIC)  KETOROLAC (TORADOL)  OXAPROZIN (DAYPRO)  CELECOXIB (CELEBREX)    Kidney Stone Prevention    Fluid Intake    A high fluid intake is one of the most important cornerstones of kidney stone dietary prevention. A sufficiently dilute urine will prevent the individual chemical components of stones from becoming concentrated enough to precipitate out of solution, keeping them instead in their dissolved state. A high urine output also may reduce stone from forming through "flushing" out of stone components and prevention of urine stagnation. In addition to stone benefits, increased water intake has been shown to have a multitude of other benefits, including improved alertness, better skin appearance, enhanced physical performance, reduced constipation and enhanced weight loss.     The average daily urine output for normal healthy adults is 1.2 liters a day, ranging from 1 to 2 liters in most individuals and varying with body weight and gender. In stone formers, however, a higher daily urine output is required for stone prevention and achieving a daily volume of at least 2.0 to 2.5 liters a day can significantly reduce the recurrence of future stones. In a randomized study of stone formers who were given specific instructions to increase their fluid intake compared to stone formers told to not change their diet, those given specific fluid instructions achieved a high urine output of 2.6 liters a day versus 1.0 liters a day in those not given dietary instructions. Over a period of five years, the high fluid intake group was half as likely to form new stones as compared to the normal fluid intake group (Dandy et al, J Urol 1996). We recommend that most stone formers increase their daily fluid intake by one liter (an additional two 16 oz water bottles or two tall glasses a day) in order to achieve a urine output of 2.5 liters a day. (One liter = 4.2 8-oz glasses or 34 oz). Alternatively, a 24 hour urine collection can be performed to guide fluid intake to achieve 2.5 liters of urine output in a specific patient. Type of Fluid Intake    The type of fluid intake is generally less important than the total intake. While drinking water is our preferred recommendation because it is inexpensive and contains no calories, for stone patients who do not enjoy drinking water, any beverage will be beneficial in reducing stone risk. Contrary to popular belief, studies have found that an increased intake of tea, coffee, and alcoholic beverage actually reduces the risk of stones, possibly because of an associated increase in urine output (Donta et al, Am J of Epidemiology, 1996). While tea contains high levels of oxalate, this does not appear to result in increased stone formation for the reasons discussed below in discussion on oxalate. Soda intake (including theodora) and milk intake also do not appear to increase the risk of stones.     Citrus Fruit Juices    Citrus juices, including lemon juice and orange juice, contain citrate, which acts as a stone inhibitor for calcium based stones. Citrate seems to do this by binding calcium, making it unavailable to combine with oxalate or phosphate: a necessary first step in the formation of stones. Citrate also seems to make it more difficult for stones to grow once they've formed. Drinking citrus juice in the form of concentrated lemon juice mixed with water has been shown to effectively increase urinary citrate levels and reduce urinary calcium levels, both of which will reduce stone risk. Orange juice will similarly increase urinary citrate levels. However, orange juice appears to also increase urinary oxalate levels ( a stone promoter). Other sources of citrate, including grapefruit juice, have had less research completed confirming their beneficial effects on urinary citrate levels. Therefore, lemon juice is typically favored over the other citrus juices as a natural method to increase urinary citrate levels. Many patients find drinking citrus juices to be an attractive alternative to pharmaceutical treatment with potassium citrate. We recommend that stone formers consider supplementing their daily fluid intake with a mixture of 60 ml of concentrated lemon juice in one liter of water to increase their urinary citrate levels. Salt    A high sodium intake increases the risk of stone formation by increasing calcium levels and decreasing citrate (a stone inhibitor) levels in urine. Additionally, high sodium intake will impair the ability of medications such as hydrochlorothiazide to effectively reduce calcium levels in urine. A study of stone formers who were kept on a strict diet with a maximum daily sodium intake of 50 mmol (1200 mg) in addition to a reduced protein diet demonstrated that the low sodium diet was effective in reducing stone recurrence by 50% as compared to the low calcium diet.     We recommend that stone formers aim to follow the FDA's guideline of limiting salt intake to 2300 mg of sodium a day in the general population and 1500 mg of sodium a day in those with hypertension,  Americans, or middle aged and older adults. 2300 mg is equivalent to about 1 teaspoon of table salt. The best way to determine the salt content of your food is to read the nutrition label. Processed foods tend to contain higher amounts of salt. Choose low sodium options whenever possible. 1 cup of canned chicken noodle soup contains 870 mg of sodium. A fried chicken drumstick contains 310 mg of sodium. A serving of shrimp contains 240 mg of sodium. 2 slices of white bread contains 200 mg of sodium. 15 potato chips contain 180 mg of sodium. 1 container of strawberry yogurt contains 85 mg of sodium. 1 tomato contains 20 mg of sodium. 1 apple contains 0 mg of sodium. In addition to lowering the risk of stones, a low sodium intake helps to control or prevent high blood pressure, which can lead to heart disease, stroke, heart failure, and kidney disease. Animal Protein    Animal protein in meat products increases the risk of stone by increasing calcium, oxalate, and uric acid levels in urine. All three of these changes increase the risk of stones. In studies comparing high meat eaters versus low meat eaters, high meat eaters were found to be at increased risk of forming stones. A randomized study of stone formers restricted to a low meat intake of 52 grams a day (equivalent to 8 oz of beef) in combination with sodium restriction found that the combination reduced stone recurrence by 50% compared to calcium restriction alone (Dandy et al, Radha Boyer 2002). We recommend that most stone formers try to reduce their meat intake to 6 oz a day. This includes all types of meat: beef, pork, poultry, and seafood. The USDA has recommended a daily allowance of 5-6 oz of protein intake among adults.  They also recommend choosing non-meat protein foods such as nuts and beans instead of meat sources. Protein from non-meat sources does not appear to increase the risk of stones. A small steak contains about 3-4 oz of protein. A quarter pound hamburger with cheese contains 4 oz of protein. A chicken breast contains about 5 oz of protein, a chicken thigh about 2.5 oz, a chicken drumstick about 1.5 oz. One 5 oz can of tuna contains 5 oz of protein. 1 medium egg contains 1 oz of protein. Lowering your animal protein intake and eating more fruits and vegetables also benefits your overall health by limiting the amount of saturated fats and cholesterol in your diet. This helps to reduce your risk of cardiovascular disease. Oxalate    While oxalate plays an important role in the development of calcium oxalate stones, dietary restriction does not appear to be effective in reducing the risk of stones in the majority of patients. About 40% of urinary oxalate comes from dietary sources while the remainder is naturally made within the liver. Therefore, reducing oxalate dietary intake does not always have a significant impact on total urinary oxalate levels. Oxalate is found in many vegetable and fruits, including many healthy dietary choices often making it difficult to achieve a low oxalate diet. Because of these issues, oxalate avoidance is beneficial primarily in those individuals with specific abnormalities that lead to high oxalate urinary levels. We recommend that most stone formers should maintain a normal oxalate intake without the need for oxalate restriction. High oxalate intake should be avoided in individuals found to have high urinary oxalate levels on metabolic evaluation. Oxalate restriction may be beneficial in certain individuals with high urinary oxalate levels.     Oxalate Rich Foods    Tea (black)  Spinach  Mustard Greens  Chard  Beets  Rhubarb  Okra  Berries  Chocolate  Nuts  Sweet Potatoes        Calcium    Kidney Stone formers often question whether to stop or reduce their calcium intake. Despite the fact that calcium is a major component of 75% of stones, excessive calcium intake is vary rarely the cause of stone formation. In fact, several studies have shown that restricting calcium intake in most stone formers actually increases the number of stones they develop. This appears to happen because when less calcium is ingested, it becomes easier for oxalate (which normally binds with calcium in the gut) to be absorbed. Higher levels of oxalate in the urine then lead to an increase in stone risk. Calcium obtained from dietary sources appears to be better than calcium from supplements in regards to lowering stone risk because supplements can actually increase your risk of stones slightly (by 17%) while dietary calcium intake is instead associated with lower stone risk. If you need to take supplements, taking them during meals appear to be better in terms of stone risk. We recommend that stone formers maintain a normal calcium intake, preferably from dietary sources. Female stone formers taking calcium supplementation to prevent osteoporosis experience a slightly increased risk of stones (17%) which needs to be balanced against their risk of osteoporosis. Things to do to reduce your blood pressure include working with all your physician to do the following:  ~ stop smoking if you smoke. ~ increase cardiovascular exercise like walking and swimming.   ~ modify your diet to decrease fat and salt intake. ~ reduce your weight if you are overweight or obese.  ~ increase the consumption of fruits, vegetables and whole grains. ~ decrease alcohol consumption if you consume alcohol.   ~ try to minimize stress in your life with lifestyle modifications. ~ be compliant with your anti-hypertensive medications.    ~ adjust your medications to help improve your vascular stiffness and decrease risks for heart attacks and strokes. Exercise to Lose Weight     Exercise and a healthy diet may help you lose weight. Your doctor may suggest specific exercises. EXERCISE IDEAS AND TIPS     Choose low-cost things you enjoy doing, such as walking, bicycling, or exercising to workout videos. Take stairs instead of the elevator. Walk during your lunch break. Park your car further away from work or school. Go to a gym or an exercise class. Start with 5 to 10 minutes of exercise each day. Build up to 30 minutes of exercise 4 to 6 days a week. Wear shoes with good support and comfortable clothes. Stretch before and after working out. Work out until you breathe harder and your heart beats faster. Drink extra water when you exercise. Do not do so much that you hurt yourself, feel dizzy, or get very short of breath. Exercises that burn about 150 calories:   Running 1 ½ miles in 15 minutes. Playing volleyball for 45 to 60 minutes. Washing and waxing a car for 45 to 60 minutes. Playing touch football for 45 minutes. Walking 1 ¾ miles in 35 minutes. Pushing a stroller 1 ½ miles in 30 minutes. Playing basketball for 30 minutes. Raking leaves for 30 minutes. Bicycling 5 miles in 30 minutes. Walking 2 miles in 30 minutes. Dancing for 30 minutes. Shoveling snow for 15 minutes. Swimming laps for 20 minutes. Walking up stairs for 15 minutes. Bicycling 4 miles in 15 minutes. Gardening for 30 to 45 minutes. Jumping rope for 15 minutes. Washing windows or floors for 45 to 60 minutes.

## 2023-07-19 NOTE — PROGRESS NOTES
Nephrology Initial Consultation  Seth Urban 46 y.o. male MRN: [id number]            REASON FOR CONSULTATION:  Seth Urban is a 46 y.o.male who was referred by Chika Curry MD for evaluation of Consult and Nephrolithiasis  . ASSESSMENT / PLAN:   46 y.o.  male with pmh of multiple co-morbidities including morbid obesity, GERD, YELENA on CPAP, paroyxsmal afib, multinodular goiter, hypertension (>10yrs), gout presents to the office for evaluation and management of nephrolithiasis. CKD stage 2:  - After review of records in [facility] as well as Care everywhere patient has a baseline creatinine of 0.9-1.1 mg/dL as far back as 2018. Most recent labs show a Creatinine of 1.04 mg/dL on 6/6/2023. Renal function remains stable.   -Likely has underlying CKD secondary to age-related nephron loss plus obesity due to hyperfiltration plus hypertensive nephrosclerosis plus renal scarring from nephrolithiasis. Also there is some degree of underestimation of renal function secondary to patient's muscular body habitus  -CT abdomen 2/10/2023 right kidney multiple nonobstructing intrarenal calculi measuring up to 5 mm no hydronephrosis. Left kidney multiple nonobstructing midpole calculi measuring up to 8 mm no hydronephrosis. - Acid base and lytes stable. - Clinically the patient appears to be euvolemic  - Recommend to avoid use of NSAIDs, nephrotoxins. Caution advised with regards to exposure to IV contrast dye.   - Discussed with the patient in depth his renal status, including the possible etiologies for CKD. - Advised the patient that when his GFR is close to 20mL/min then will start discussing about RRT(renal replacement therapy) options such as renal transplant, peritoneal dialysis and hemodialysis. - Informed the patient about the various options for Renal Replacement therapy.   - Discussed with the patient how we need to work together to delay the progression of CKD with optimal BP control based on their age and co-morbidities, optimal BS control with HbA1c of <7% and trying to reduce proteinuria by the use of anti-proteinuric agents. Hypertension:  - Patient is on Cardizem 180 mg p.o. daily, lisinopril/HCTZ 20/25 mg p.o. daily.   -Changes at this time advised appropriate diet  - Goal BP of <  130/80 based on age and comorbidities  - Instructed to follow low sodium (2gm)diet.  - Advised to hold ACEI/ARBs if patient suffers from dehydration due to gastrointestinal losses due to risk of WAYNE secondary to failure to autoregulate. Hemoglobin:  - Goal Hb of 10-12 g/dL  - Most recent labs suggestive of 14.7 g/dL. -No role for IV iron at this time    CKD-MBD(Mineral Bone Disease): - Based on patients CKD stage following is the goal of therapy. - Maintain calcium phosphorus product of < 55.  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  - Discussed with the patient that the most common types of kidney stones are calcium oxalate stones. - Advised to follow a diet with increased fluid intake so that urine output is greater than 2-2.5L/day. - Advised patient to decrease salt, protein and oxalate intake. - Dietary handouts given. - Ordered for 24 hr urine testing with [facility] stone workup analysis.    - advised patient to call 2 weeks after it is completed to review and discuss the results  - Check ca, ipth, bmp.   -Continue follow-up with urology    Multinodular goiter:  - Management as per primary team  -Follow-up with endocrinology last seen 12/5/2022 notes reviewed, they are recommending surgery. Nutrition/morbid obesity:  - Encouraged patient to follow a renal diet comprising of moderate potassium, low phosphorus and protein restriction to 0.8gm/kg. Advised of dietary habits and weight loss. - Will check serum albumin with next blood work. Followup:  - Patient is to follow-up in6 months, with lab work to be performed after the visit and then again in a a few days prior to the next visit. Advised patient to call me in 10 days to review the results if they do not hear back from me, as I may have not received the results. Manfred Talley MD, FASN, 7/19/2023, 1:42 PM             HISTORY OF PRESENT ILLNESS: 46 y.o. male with multiple comorbidities including morbid obesity, GERD, YELENA on CPAP, paroyxsmal afib, multinodular goiter, hypertension (>10yrs), gout presents to the office for evaluation and management of nephrolithiasis. Review of record shows patient has a baseline creatinine of 0.9 to 1.1 mg/dL dating as far back as 2018 most recent labs on 6/6/2023 creatinine 1.04 mg/dL. Status post ESWL on 4/27/2023. First stone episode 15 yrs ago. Has had eswl long ago and then again in April 2023. Last stone passage was more than 5 yrs ago. Has cut down significantly in terms of whey protein powder usage. Drinks about 1-2L a day of water. Occasional dark sodas. Never seen a nephrologist before no history of WAYNE needing dialysis. Occasional NSAID use. Thankful for the care information that is gone today. Reports depending on his work schedule his water intake changes. He works with IT at [employer]. Review of Systems   Constitutional: Negative for chills and fatigue. HENT: Negative for congestion, rhinorrhea and sore throat. Respiratory: Negative for cough, shortness of breath and wheezing. Cardiovascular: Negative for leg swelling.    Gastrointestinal: Negative for constipation, diarrhea and vomiting. Genitourinary: Negative for difficulty urinating, dysuria, flank pain and hematuria. Musculoskeletal: Negative for back pain. Intermittent   Skin: Negative for wound. Neurological: Negative for dizziness, light-headedness and headaches. Psychiatric/Behavioral: Negative for agitation and confusion. All other systems reviewed and are negative. PAST MEDICAL HISTORY:  Past Medical History:   Diagnosis Date   • CPAP (continuous positive airway pressure) dependence    • GERD (gastroesophageal reflux disease)    • Herpes zoster without complication 82/11/3268   • Hypertension    • Irregular heart beat     SVT   • Kidney stone    • Multinodular goiter 12/12/2018   • Seasonal allergies    • Sleep apnea        PROBLEM LIST    Patient Active Problem List   Diagnosis   • Allergic rhinitis   • Bilateral nephrolithiasis   • Essential hypertension   • Morbid obesity ([address])   • Obstructive sleep apnea   • Paroxysmal SVT (supraventricular tachycardia) ([address])   • Annual physical exam   • Multinodular goiter   • Synovial cyst of right popliteal space   • Laryngopharyngeal reflux (LPR)   • Tension headache   • Impaired fasting blood sugar   • Calculus of gallbladder without cholecystitis without obstruction   • NAFL (nonalcoholic fatty liver)   • CKD (chronic kidney disease) stage 2, GFR 60-89 ml/min       PAST SURGICAL HISTORY:  Past Surgical History:   Procedure Laterality Date   • AK LITHOTRIPSY XTRCORP SHOCK WAVE Left 4/27/2023    Procedure: ESWL;  Surgeon: Sydnie Wyatt MD;  Location: [address] [place] [address];  Service: Urology   • US GUIDED MSK PROCEDURE  4/23/2019   • [address] THYROID BIOPSY  12/28/2018       SOCIAL HISTORY :   reports that he has never smoked. He has never used smokeless tobacco. He reports current alcohol use of about 1.0 standard drink of alcohol per week. He reports that he does not use drugs.     FAMILY HISTORY:  Family History   Problem Relation Age of Onset   • Hyperlipidemia Mother    • Heart block Mother    • Stroke Mother    • Hypertension Mother    • Kidney nephrosis Father    • Nephrolithiasis Father    • Nephrolithiasis Brother    • Stroke Maternal Grandmother    • No Known Problems Maternal Grandfather    • Cancer Paternal Grandmother    • Heart disease Paternal Grandfather        ALLERGIES:  No Known Allergies        PHYSICAL EXAM:  Vitals:    07/19/23 1300   BP: 124/64   BP Location: Left arm   Patient Position: Sitting   Cuff Size: Large   Weight: 128 kg (282 lb)   Height: 5' 10" (1.778 m)     Body mass index is 40.46 kg/m². Physical Exam  Vitals reviewed. Constitutional:       General: He is not in acute distress. Appearance: Normal appearance. He is obese. He is not ill-appearing, toxic-appearing or diaphoretic. HENT:      Head: Normocephalic and atraumatic. Mouth/Throat:      Pharynx: No oropharyngeal exudate. Eyes:      General: No scleral icterus. Conjunctiva/sclera: Conjunctivae normal.   Cardiovascular:      Rate and Rhythm: Normal rate. Heart sounds: Normal heart sounds. No friction rub. Pulmonary:      Effort: No respiratory distress. Breath sounds: Normal breath sounds. No stridor. No wheezing. Abdominal:      General: There is no distension. Palpations: Abdomen is soft. There is no mass. Tenderness: There is no right CVA tenderness or left CVA tenderness. Musculoskeletal:         General: No swelling. Cervical back: Normal range of motion. No rigidity. Skin:     Coloration: Skin is not jaundiced. Neurological:      General: No focal deficit present. Mental Status: He is alert and oriented to person, place, and time.    Psychiatric:         Mood and Affect: Mood normal.         Behavior: Behavior normal.           LABORATORY DATA:     Results from last 6 Months   Lab Units 06/06/23  0707 04/15/23  1044   WBC Thousand/uL 7.44 6.46   HEMOGLOBIN g/dL 14.7 15.1   HEMATOCRIT % 43.8 45.2   PLATELETS Thousands/uL 281 285   POTASSIUM mmol/L 3.9 4.3   CHLORIDE mmol/L 107 107   CO2 mmol/L 27 27   BUN mg/dL 16 18   CREATININE mg/dL 1.04 0.90   CALCIUM mg/dL 9.4 9.5        rest all reviewed    RADIOLOGY:  No orders to display     Rest all reviewed        MEDICATIONS:    Current Outpatient Medications:   •  diltiazem (CARDIZEM CD) 180 mg 24 hr capsule, Take 1 capsule (180 mg total) by mouth daily, Disp: 90 capsule, Rfl: 1  •  fluticasone (FLONASE) 50 mcg/act nasal spray, 1 spray into each nostril daily PRN, Disp: , Rfl:   •  lansoprazole (PREVACID) 30 mg capsule, Take 1 capsule (30 mg total) by mouth as needed (LPR), Disp: 90 capsule, Rfl: 1  •  lisinopril-hydrochlorothiazide (PRINZIDE,ZESTORETIC) 20-25 MG per tablet, Take 1 tablet by mouth daily, Disp: 90 tablet, Rfl: 1  •  Multiple Vitamins-Minerals (multivitamin with minerals) tablet, Take 1 tablet by mouth daily, Disp: , Rfl:         Portions of the record may have been created with voice recognition software. Occasional wrong word or "sound a like" substitutions may have occurred due to the inherent limitations of voice recognition software. Read the chart carefully and recognize, using context, where substitutions have occurred. If you have any questions, please contact the dictating provider.

## 2023-07-27 ENCOUNTER — LAB (OUTPATIENT)
Dept: LAB | Age: 53
End: 2023-07-27
Payer: COMMERCIAL

## 2023-07-27 ENCOUNTER — TELEPHONE (OUTPATIENT)
Dept: NEPHROLOGY | Facility: CLINIC | Age: 53
End: 2023-07-27

## 2023-07-27 DIAGNOSIS — E79.0 HYPERURICEMIA: Primary | ICD-10-CM

## 2023-07-27 DIAGNOSIS — I10 ESSENTIAL HYPERTENSION: ICD-10-CM

## 2023-07-27 DIAGNOSIS — E66.01 MORBID OBESITY (HCC): ICD-10-CM

## 2023-07-27 DIAGNOSIS — N18.2 CKD (CHRONIC KIDNEY DISEASE) STAGE 2, GFR 60-89 ML/MIN: ICD-10-CM

## 2023-07-27 DIAGNOSIS — N20.0 BILATERAL NEPHROLITHIASIS: ICD-10-CM

## 2023-07-27 DIAGNOSIS — E04.2 MULTINODULAR GOITER: ICD-10-CM

## 2023-07-27 DIAGNOSIS — N20.0 CALCIUM OXALATE STONES: ICD-10-CM

## 2023-07-27 LAB
25(OH)D3 SERPL-MCNC: 29.3 NG/ML (ref 30–100)
ALBUMIN SERPL BCP-MCNC: 3.7 G/DL (ref 3.5–5)
ANION GAP SERPL CALCULATED.3IONS-SCNC: 5 MMOL/L
BACTERIA UR QL AUTO: NORMAL /HPF
BILIRUB UR QL STRIP: NEGATIVE
BUN SERPL-MCNC: 17 MG/DL (ref 5–25)
CALCIUM SERPL-MCNC: 9.3 MG/DL (ref 8.3–10.1)
CHLORIDE SERPL-SCNC: 107 MMOL/L (ref 96–108)
CLARITY UR: CLEAR
CO2 SERPL-SCNC: 28 MMOL/L (ref 21–32)
COLOR UR: NORMAL
CREAT SERPL-MCNC: 1.07 MG/DL (ref 0.6–1.3)
GFR SERPL CREATININE-BSD FRML MDRD: 78 ML/MIN/1.73SQ M
GLUCOSE P FAST SERPL-MCNC: 110 MG/DL (ref 65–99)
GLUCOSE UR STRIP-MCNC: NEGATIVE MG/DL
HGB UR QL STRIP.AUTO: NEGATIVE
KETONES UR STRIP-MCNC: NEGATIVE MG/DL
LEUKOCYTE ESTERASE UR QL STRIP: NEGATIVE
NITRITE UR QL STRIP: NEGATIVE
NON-SQ EPI CELLS URNS QL MICRO: NORMAL /HPF
PH UR STRIP.AUTO: 6 [PH]
PHOSPHATE SERPL-MCNC: 3 MG/DL (ref 2.7–4.5)
POTASSIUM SERPL-SCNC: 3.9 MMOL/L (ref 3.5–5.3)
PROT UR STRIP-MCNC: NEGATIVE MG/DL
PTH-INTACT SERPL-MCNC: 38 PG/ML (ref 12–88)
RBC #/AREA URNS AUTO: NORMAL /HPF
SODIUM SERPL-SCNC: 140 MMOL/L (ref 135–147)
SP GR UR STRIP.AUTO: 1.01 (ref 1–1.03)
URATE SERPL-MCNC: 8.9 MG/DL (ref 3.5–8.5)
UROBILINOGEN UR STRIP-ACNC: <2 MG/DL
WBC #/AREA URNS AUTO: NORMAL /HPF

## 2023-07-27 PROCEDURE — 36415 COLL VENOUS BLD VENIPUNCTURE: CPT

## 2023-07-27 PROCEDURE — 84550 ASSAY OF BLOOD/URIC ACID: CPT

## 2023-07-27 PROCEDURE — 82570 ASSAY OF URINE CREATININE: CPT | Performed by: INTERNAL MEDICINE

## 2023-07-27 PROCEDURE — 83945 ASSAY OF OXALATE: CPT | Performed by: INTERNAL MEDICINE

## 2023-07-27 PROCEDURE — 82306 VITAMIN D 25 HYDROXY: CPT

## 2023-07-27 PROCEDURE — 83935 ASSAY OF URINE OSMOLALITY: CPT | Performed by: INTERNAL MEDICINE

## 2023-07-27 PROCEDURE — 83735 ASSAY OF MAGNESIUM: CPT | Performed by: INTERNAL MEDICINE

## 2023-07-27 PROCEDURE — 80069 RENAL FUNCTION PANEL: CPT

## 2023-07-27 PROCEDURE — 82131 AMINO ACIDS SINGLE QUANT: CPT | Performed by: INTERNAL MEDICINE

## 2023-07-27 PROCEDURE — 83970 ASSAY OF PARATHORMONE: CPT

## 2023-07-27 PROCEDURE — 84392 ASSAY OF URINE SULFATE: CPT | Performed by: INTERNAL MEDICINE

## 2023-07-27 PROCEDURE — 81001 URINALYSIS AUTO W/SCOPE: CPT

## 2023-07-27 PROCEDURE — 84105 ASSAY OF URINE PHOSPHORUS: CPT | Performed by: INTERNAL MEDICINE

## 2023-07-27 PROCEDURE — 82436 ASSAY OF URINE CHLORIDE: CPT | Performed by: INTERNAL MEDICINE

## 2023-07-27 PROCEDURE — 81003 URINALYSIS AUTO W/O SCOPE: CPT | Performed by: INTERNAL MEDICINE

## 2023-07-27 PROCEDURE — 82507 ASSAY OF CITRATE: CPT | Performed by: INTERNAL MEDICINE

## 2023-07-27 PROCEDURE — 84165 PROTEIN E-PHORESIS SERUM: CPT

## 2023-07-27 PROCEDURE — 82340 ASSAY OF CALCIUM IN URINE: CPT | Performed by: INTERNAL MEDICINE

## 2023-07-27 PROCEDURE — 84133 ASSAY OF URINE POTASSIUM: CPT | Performed by: INTERNAL MEDICINE

## 2023-07-27 PROCEDURE — 84560 ASSAY OF URINE/URIC ACID: CPT | Performed by: INTERNAL MEDICINE

## 2023-07-27 PROCEDURE — 82140 ASSAY OF AMMONIA: CPT | Performed by: INTERNAL MEDICINE

## 2023-07-27 PROCEDURE — 84300 ASSAY OF URINE SODIUM: CPT | Performed by: INTERNAL MEDICINE

## 2023-07-27 RX ORDER — ALLOPURINOL 100 MG/1
100 TABLET ORAL DAILY
Qty: 90 TABLET | Refills: 3 | Status: SHIPPED | OUTPATIENT
Start: 2023-07-27

## 2023-07-27 NOTE — TELEPHONE ENCOUNTER
----- Message from JORJE ADLER MD sent at 7/27/2023  2:03 PM EDT -----  Please advise patient his vitamin D level is running slightly low start vitamin D 1000 units p.o. daily over-the-counter. His creatinine is stable at baseline some test results are still pending if anything abnormal comes back I will be in touch with him. His uric acid level was significantly elevated. I am sending in a prescription for allopurinol 100 mg once a day please start taking that that will help his gout as well as his kidneys. If he develops any kind of rash after starting allopurinol he needs to stop taking the medication right away and inform us. If he has any other further questions or concerns please let me know.

## 2023-07-27 NOTE — RESULT ENCOUNTER NOTE
Please advise patient his vitamin D level is running slightly low start vitamin D 1000 units p.o. daily over-the-counter. His creatinine is stable at baseline some test results are still pending if anything abnormal comes back I will be in touch with him. His uric acid level was significantly elevated. I am sending in a prescription for allopurinol 100 mg once a day please start taking that that will help his gout as well as his kidneys. If he develops any kind of rash after starting allopurinol he needs to stop taking the medication right away and inform us. If he has any other further questions or concerns please let me know.

## 2023-07-28 LAB
ALBUMIN SERPL ELPH-MCNC: 4.18 G/DL (ref 3.2–5.1)
ALBUMIN SERPL ELPH-MCNC: 60.6 % (ref 48–70)
ALPHA1 GLOB SERPL ELPH-MCNC: 0.27 G/DL (ref 0.15–0.47)
ALPHA1 GLOB SERPL ELPH-MCNC: 3.9 % (ref 1.8–7)
ALPHA2 GLOB SERPL ELPH-MCNC: 0.62 G/DL (ref 0.42–1.04)
ALPHA2 GLOB SERPL ELPH-MCNC: 9 % (ref 5.9–14.9)
BETA GLOB ABNORMAL SERPL ELPH-MCNC: 0.43 G/DL (ref 0.31–0.57)
BETA1 GLOB SERPL ELPH-MCNC: 6.3 % (ref 4.7–7.7)
BETA2 GLOB SERPL ELPH-MCNC: 6.3 % (ref 3.1–7.9)
BETA2+GAMMA GLOB SERPL ELPH-MCNC: 0.43 G/DL (ref 0.2–0.58)
GAMMA GLOB ABNORMAL SERPL ELPH-MCNC: 0.96 G/DL (ref 0.4–1.66)
GAMMA GLOB SERPL ELPH-MCNC: 13.9 % (ref 6.9–22.3)
IGG/ALB SER: 1.54 {RATIO} (ref 1.1–1.8)
PROT PATTERN SERPL ELPH-IMP: NORMAL
PROT SERPL-MCNC: 6.9 G/DL (ref 6.4–8.2)

## 2023-07-28 PROCEDURE — 84165 PROTEIN E-PHORESIS SERUM: CPT | Performed by: PATHOLOGY

## 2023-07-28 NOTE — TELEPHONE ENCOUNTER
I lm for Mirza May in regards to  message below and asked he call back is he has any question and confirm he understood changes. vitamin D level is running slightly low start vitamin D 1000 units p.o. daily over-the-counter. His creatinine is stable at baseline some test results are still pending if anything abnormal comes back I will be in touch with him. His uric acid level was significantly elevated. I am sending in a prescription for allopurinol 100 mg once a day please start taking that that will help his gout as well as his kidneys. If he develops any kind of rash after starting allopurinol he needs to stop taking the medication right away and inform us.   If he has any other further questions or concerns please let me know

## 2023-07-28 NOTE — TELEPHONE ENCOUNTER
Received a call from pt stating that he received the message. he is taking Multivitamin and include vitamin D 3, take 1 tablet daily total of multivitamin tablets is 3400 units. Dr. Sheyla Gutierrez,    Pt said if he has to take another tables for Vitamin D or he is okay with what he is taking. Alert-The patient is alert, awake and responds to voice. The patient is oriented to time, place, and person. The triage nurse is able to obtain subjective information.

## 2023-08-04 LAB
AMMONIA 24H UR-MRATE: 21 MEQ/24 HR
AMMONIA UR-SCNC: ABNORMAL UG/DL
CA H2 PHOS DIHYD CRY URNS QL MICRO: 0.84 RATIO (ref 0–3)
CALCIUM 24H UR-MCNC: 3.4 MG/DL
CALCIUM 24H UR-MRATE: 107.1 MG/24 HR (ref 0–320)
CHLORIDE 24H UR-SCNC: 61 MMOL/L
CHLORIDE 24H UR-SRATE: 192 MMOL/24 HR (ref 52–264)
CITRATE 24H UR-MCNC: 145 MG/L
CITRATE 24H UR-MRATE: 457 MG/24 HR (ref 320–1240)
COM CRY STONE QL IR: 0.64 RATIO (ref 0–6)
CREAT 24H UR-MCNC: 39.2 MG/DL
CREAT 24H UR-MRATE: 1234.8 MG/24 HR (ref 1000–2000)
CYSTINE 24H UR-MCNC: 2.93 MG/L
CYSTINE 24H UR-MRATE: 9.23 MG/24 HR (ref 2.1–58)
MAGNESIUM 24H UR-MRATE: 57 MG/24 HR (ref 12–293)
MAGNESIUM UR-MCNC: 1.8 MG/DL
NA URATE CRY STONE QL IR: 2.35 RATIO (ref 0–4)
OSMOLALITY UR: 332 MOSMOL/KG (ref 300–900)
OXALATE 24H UR-MRATE: 16 MG/24 HR (ref 7–44)
OXALATE UR-MCNC: 5 MG/L
PH 24H UR: 6.5 [PH] (ref 4.5–8)
PHOSPHATE 24H UR-MCNC: 51.6 MG/DL
PHOSPHATE 24H UR-MRATE: 1625.4 MG/24 HR (ref 390–1425)
PLEASE NOTE (STONE RISK): ABNORMAL
POTASSIUM 24H UR-SCNC: 83.2 MMOL/24 HR (ref 20–116)
POTASSIUM UR-SCNC: 26.4 MMOL/L
PRESERVED URINE: 3150 ML/24 HR (ref 800–1800)
SODIUM 24H UR-SCNC: 80 MMOL/L
SODIUM 24H UR-SRATE: 252 MMOL/24 HR (ref 58–337)
SPECIMEN VOL 24H UR: 3150 ML/24 HR (ref 800–1800)
SULFATE 24H UR-MCNC: 35 MEQ/24 HR (ref 0–30)
SULFATE UR-MCNC: 11 MEQ/L
TRI-PHOS CRY STONE MICRO: 0.01 RATIO (ref 0–1)
URATE 24H UR-MCNC: 25.3 MG/DL
URATE 24H UR-MRATE: 797 MG/24 HR (ref 197–1079)
URATE DIHYD CRY STONE QL IR: 0.37 RATIO (ref 0–1.2)

## 2023-08-15 ENCOUNTER — TELEPHONE (OUTPATIENT)
Dept: OTHER | Facility: HOSPITAL | Age: 53
End: 2023-08-15

## 2023-08-15 ENCOUNTER — TELEPHONE (OUTPATIENT)
Dept: NEPHROLOGY | Facility: CLINIC | Age: 53
End: 2023-08-15

## 2023-08-15 DIAGNOSIS — R82.991 HYPOCITRATURIA: Primary | ICD-10-CM

## 2023-08-15 RX ORDER — POTASSIUM CITRATE 15 MEQ/1
1 TABLET, EXTENDED RELEASE ORAL 2 TIMES DAILY WITH MEALS
Qty: 180 TABLET | Refills: 3 | Status: SHIPPED | OUTPATIENT
Start: 2023-08-15

## 2023-08-15 NOTE — TELEPHONE ENCOUNTER
Called and spoke to patient with regards to most recent 24 urine test results advised patient continue good fluid intake advised him to follow low-sodium diet low phosphorus diet and watch out the supplements that he is taking to ensure there is no high phosphorus content since his urinary phosphorus levels are high. Also advised him of borderline low citrate levels we will start him on potassium citrate 15 mEq p.o. twice daily for now. Advised him to get renal function panel in 1 month to ensure stability of electrolytes. We will have him get 24 urine test done in 6 months to see if medications need further adjustment he is also recently been started on allopurinol. Advised him to continue his vitamin D 3400 units for now. All questions and concerns answered.
99

## 2023-08-15 NOTE — TELEPHONE ENCOUNTER
----- Message from JORJE ADLER MD sent at 8/15/2023  3:39 PM EDT -----  Regarding: Mail out  Please mail out order for renal function panel to be done in 1 month

## 2023-12-13 ENCOUNTER — OFFICE VISIT (OUTPATIENT)
Dept: INTERNAL MEDICINE CLINIC | Facility: OTHER | Age: 53
End: 2023-12-13
Payer: COMMERCIAL

## 2023-12-13 VITALS
DIASTOLIC BLOOD PRESSURE: 74 MMHG | SYSTOLIC BLOOD PRESSURE: 130 MMHG | OXYGEN SATURATION: 97 % | HEIGHT: 70 IN | WEIGHT: 294.4 LBS | BODY MASS INDEX: 42.15 KG/M2 | HEART RATE: 78 BPM | TEMPERATURE: 98.7 F | RESPIRATION RATE: 18 BRPM

## 2023-12-13 DIAGNOSIS — I47.10 PAROXYSMAL SVT (SUPRAVENTRICULAR TACHYCARDIA): ICD-10-CM

## 2023-12-13 DIAGNOSIS — I10 ESSENTIAL HYPERTENSION: Primary | ICD-10-CM

## 2023-12-13 DIAGNOSIS — Z12.12 ENCOUNTER FOR COLORECTAL CANCER SCREENING: ICD-10-CM

## 2023-12-13 DIAGNOSIS — E66.01 MORBID OBESITY (HCC): ICD-10-CM

## 2023-12-13 DIAGNOSIS — Z12.11 ENCOUNTER FOR COLORECTAL CANCER SCREENING: ICD-10-CM

## 2023-12-13 DIAGNOSIS — E04.2 MULTINODULAR GOITER: ICD-10-CM

## 2023-12-13 DIAGNOSIS — Z12.5 PROSTATE CANCER SCREENING: ICD-10-CM

## 2023-12-13 DIAGNOSIS — K21.9 LARYNGOPHARYNGEAL REFLUX (LPR): ICD-10-CM

## 2023-12-13 DIAGNOSIS — K76.0 NAFL (NONALCOHOLIC FATTY LIVER): ICD-10-CM

## 2023-12-13 DIAGNOSIS — N18.2 CKD (CHRONIC KIDNEY DISEASE) STAGE 2, GFR 60-89 ML/MIN: ICD-10-CM

## 2023-12-13 DIAGNOSIS — R73.01 IMPAIRED FASTING BLOOD SUGAR: ICD-10-CM

## 2023-12-13 PROCEDURE — 99214 OFFICE O/P EST MOD 30 MIN: CPT | Performed by: INTERNAL MEDICINE

## 2023-12-13 RX ORDER — LANSOPRAZOLE 30 MG/1
30 CAPSULE, DELAYED RELEASE ORAL AS NEEDED
Qty: 90 CAPSULE | Refills: 1 | Status: SHIPPED | OUTPATIENT
Start: 2023-12-13

## 2023-12-13 RX ORDER — DILTIAZEM HYDROCHLORIDE 180 MG/1
180 CAPSULE, COATED, EXTENDED RELEASE ORAL DAILY
Qty: 90 CAPSULE | Refills: 1 | Status: SHIPPED | OUTPATIENT
Start: 2023-12-13

## 2023-12-13 RX ORDER — LISINOPRIL AND HYDROCHLOROTHIAZIDE 25; 20 MG/1; MG/1
1 TABLET ORAL DAILY
Qty: 90 TABLET | Refills: 1 | Status: SHIPPED | OUTPATIENT
Start: 2023-12-13

## 2023-12-13 NOTE — PROGRESS NOTES
Assessment/Plan:    Laryngopharyngeal reflux (LPR)  Well-controlled on Prevacid 30 mg every other day. NAFL (nonalcoholic fatty liver)  Continue to trend LFTs. Discussed diet and exercise. Multinodular goiter  He would like a thyroid ultrasound checked. Impaired fasting blood sugar  Discussed diet and exercise. Trend A1c. Essential hypertension  Stable and controlled. Continue current antihypertensive regimen. CKD (chronic kidney disease) stage 2, GFR 60-89 ml/min  Lab Results   Component Value Date    EGFR 78 07/27/2023    EGFR 81 06/06/2023    EGFR 97 04/15/2023    CREATININE 1.07 07/27/2023    CREATININE 1.04 06/06/2023    CREATININE 0.90 04/15/2023   Continue to trend kidney function. Diagnoses and all orders for this visit:    Essential hypertension  -     lisinopril-hydrochlorothiazide (PRINZIDE,ZESTORETIC) 20-25 MG per tablet; Take 1 tablet by mouth daily  -     CBC; Future  -     Comprehensive metabolic panel; Future  -     Hemoglobin A1C; Future  -     Lipid panel; Future  -     Albumin / creatinine urine ratio; Future    Paroxysmal SVT (supraventricular tachycardia)  -     diltiazem (CARDIZEM CD) 180 mg 24 hr capsule; Take 1 capsule (180 mg total) by mouth daily    Laryngopharyngeal reflux (LPR)  -     lansoprazole (PREVACID) 30 mg capsule; Take 1 capsule (30 mg total) by mouth as needed (LPR)    Multinodular goiter  -     US thyroid; Future  -     TSH, 3rd generation with Free T4 reflex; Future    Encounter for colorectal cancer screening  -     Occult Blood, Fecal Immunochemical; Future    Prostate cancer screening  -     PSA, Total Screen; Future    Impaired fasting blood sugar  -     CBC; Future  -     Comprehensive metabolic panel; Future  -     Hemoglobin A1C; Future  -     Lipid panel; Future  -     Albumin / creatinine urine ratio;  Future    NAFL (nonalcoholic fatty liver)    CKD (chronic kidney disease) stage 2, GFR 60-89 ml/min    Morbid obesity (HCC)          BMI Counseling: Body mass index is 42.24 kg/m². The BMI is above normal. Nutrition recommendations include decreasing portion sizes, encouraging healthy choices of fruits and vegetables, decreasing fast food intake, consuming healthier snacks, limiting drinks that contain sugar, moderation in carbohydrate intake, increasing intake of lean protein, reducing intake of saturated and trans fat and reducing intake of cholesterol. Exercise recommendations include moderate physical activity 150 minutes/week and exercising 3-5 times per week. No pharmacotherapy was ordered. Patient referred to PCP. Rationale for BMI follow-up plan is due to patient being overweight or obese. Depression Screening and Follow-up Plan: Patient was screened for depression during today's encounter. They screened negative with a PHQ-2 score of 0. Subjective:      Patient ID: Kimber Mcleod is a 46 y.o. male. Chief Complaint   Patient presents with   • Follow-up     6 month - no labs due- had labs on July 27th if they can be reviewed. Was asking is he can have is TSH checked or if he needs to go back to ENT   • hm     Annual, phq, bmi f/u   • blood pressure log     Has list        58-year-old male seen today for follow-up of chronic conditions. Recent laboratory studies reviewed today. He has been compliant with his medication regimen. He has no active complaints or concerns at this time. In reviewing his blood pressure log, average readings are 130-140. Hypertension  This is a chronic problem. The current episode started more than 1 year ago. The problem is unchanged. The problem is controlled. Pertinent negatives include no chest pain, headaches, palpitations or shortness of breath. Past treatments include ACE inhibitors and diuretics. The current treatment provides moderate improvement. There are no compliance problems.     Heartburn  He reports no abdominal pain, no chest pain, no choking, no coughing, no nausea, no sore throat or no wheezing. This is a chronic problem. The current episode started more than 1 year ago. The problem occurs rarely. The problem has been unchanged. The symptoms are aggravated by certain foods. Pertinent negatives include no fatigue. He has tried a PPI for the symptoms. The treatment provided moderate relief. The following portions of the patient's history were reviewed and updated as appropriate: allergies, current medications, past family history, past medical history, past social history, past surgical history, and problem list.    Review of Systems   Constitutional:  Negative for activity change, appetite change, chills, diaphoresis, fatigue and fever. HENT:  Negative for congestion, postnasal drip, rhinorrhea, sinus pressure, sinus pain, sneezing and sore throat. Eyes:  Negative for visual disturbance. Respiratory:  Negative for apnea, cough, choking, chest tightness, shortness of breath and wheezing. Cardiovascular:  Negative for chest pain, palpitations and leg swelling. Gastrointestinal:  Negative for abdominal distention, abdominal pain, anal bleeding, blood in stool, constipation, diarrhea, nausea and vomiting. Endocrine: Negative for cold intolerance and heat intolerance. Genitourinary:  Negative for difficulty urinating, dysuria and hematuria. Musculoskeletal: Negative. Skin: Negative. Neurological:  Negative for dizziness, weakness, light-headedness, numbness and headaches. Hematological:  Negative for adenopathy. Psychiatric/Behavioral:  Negative for agitation, sleep disturbance and suicidal ideas. All other systems reviewed and are negative.         Past Medical History:   Diagnosis Date   • CPAP (continuous positive airway pressure) dependence    • GERD (gastroesophageal reflux disease)    • Herpes zoster without complication 45/95/6779   • Hypertension    • Irregular heart beat     SVT   • Kidney stone    • Multinodular goiter 12/12/2018   • Seasonal allergies    • Sleep apnea          Current Outpatient Medications:   •  allopurinol (ZYLOPRIM) 100 mg tablet, Take 1 tablet (100 mg total) by mouth daily, Disp: 90 tablet, Rfl: 3  •  diltiazem (CARDIZEM CD) 180 mg 24 hr capsule, Take 1 capsule (180 mg total) by mouth daily, Disp: 90 capsule, Rfl: 1  •  fluticasone (FLONASE) 50 mcg/act nasal spray, 1 spray into each nostril daily PRN, Disp: , Rfl:   •  lansoprazole (PREVACID) 30 mg capsule, Take 1 capsule (30 mg total) by mouth as needed (LPR), Disp: 90 capsule, Rfl: 1  •  lisinopril-hydrochlorothiazide (PRINZIDE,ZESTORETIC) 20-25 MG per tablet, Take 1 tablet by mouth daily, Disp: 90 tablet, Rfl: 1  •  Multiple Vitamins-Minerals (multivitamin with minerals) tablet, Take 1 tablet by mouth daily, Disp: , Rfl:   •  Potassium Citrate ER 15 MEQ (1620 MG) TBCR, Take 1 tablet by mouth 2 (two) times a day with meals, Disp: 180 tablet, Rfl: 3    No Known Allergies    Social History   Past Surgical History:   Procedure Laterality Date   • [place] LITHOTRIPSY XTRCORP SHOCK WAVE Left 4/27/2023    Procedure: ESWL;  Surgeon: Fatou Tabor MD;  Location: [address] [place] [address];  Service: Urology   • [address] MSK PROCEDURE  4/23/2019   • [address] THYROID BIOPSY  12/28/2018     Family History   Problem Relation Age of Onset   • Hyperlipidemia Mother    • Heart block Mother    • Stroke Mother    • Hypertension Mother    • Kidney nephrosis Father    • Nephrolithiasis Father    • Nephrolithiasis Brother    • Stroke Maternal Grandmother    • No Known Problems Maternal Grandfather    • Cancer Paternal Grandmother    • Heart disease Paternal Grandfather        Objective:  /74 (BP Location: Left arm, Patient Position: Sitting, Cuff Size: Large)   Pulse 78   Temp 98.7 °F (37.1 °C) (Temporal)   Resp 18   Ht 5' 10" (1.778 m)   Wt 134 kg (294 lb 6.4 oz)   SpO2 97%   BMI 42.24 kg/m²     No results found for this or any previous visit (from the past 1344 hour(s)).          Physical Exam  Vitals and nursing note reviewed. Constitutional:       General: He is not in acute distress. Appearance: He is well-developed. He is not diaphoretic. HENT:      Head: Normocephalic and atraumatic. Eyes:      General: No scleral icterus. Right eye: No discharge. Left eye: No discharge. Conjunctiva/sclera: Conjunctivae normal.      Pupils: Pupils are equal, round, and reactive to light. Neck:      Thyroid: No thyromegaly. Vascular: No JVD. Cardiovascular:      Rate and Rhythm: Normal rate and regular rhythm. Heart sounds: Normal heart sounds. No murmur heard. No friction rub. No gallop. Pulmonary:      Effort: Pulmonary effort is normal. No respiratory distress. Breath sounds: Normal breath sounds. No wheezing or rales. Chest:      Chest wall: No tenderness. Abdominal:      General: Bowel sounds are normal. There is no distension. Palpations: Abdomen is soft. There is no mass. Tenderness: There is no abdominal tenderness. There is no guarding or rebound. Musculoskeletal:         General: No tenderness or deformity. Normal range of motion. Cervical back: Normal range of motion and neck supple. Lymphadenopathy:      Cervical: No cervical adenopathy. Skin:     General: Skin is warm and dry. Coloration: Skin is not pale. Findings: No erythema or rash. Neurological:      Mental Status: He is alert and oriented to person, place, and time. Cranial Nerves: No cranial nerve deficit. Coordination: Coordination normal.      Deep Tendon Reflexes: Reflexes are normal and symmetric. Psychiatric:         Behavior: Behavior normal.         Thought Content:  Thought content normal.         Judgment: Judgment normal.

## 2023-12-13 NOTE — ASSESSMENT & PLAN NOTE
Lab Results   Component Value Date    EGFR 78 07/27/2023    EGFR 81 06/06/2023    EGFR 97 04/15/2023    CREATININE 1.07 07/27/2023    CREATININE 1.04 06/06/2023    CREATININE 0.90 04/15/2023   Continue to trend kidney function.

## 2023-12-21 ENCOUNTER — HOSPITAL ENCOUNTER (OUTPATIENT)
Dept: RADIOLOGY | Age: 53
Discharge: HOME | End: 2023-12-21
Payer: COMMERCIAL

## 2023-12-21 DIAGNOSIS — E04.2 MULTINODULAR GOITER: ICD-10-CM

## 2023-12-21 PROCEDURE — 76536 US EXAM OF HEAD AND NECK: CPT

## 2023-12-28 ENCOUNTER — TELEPHONE (OUTPATIENT)
Dept: INTERNAL MEDICINE CLINIC | Facility: OTHER | Age: 53
End: 2023-12-28

## 2024-01-05 ENCOUNTER — APPOINTMENT (OUTPATIENT)
Dept: LAB | Age: 54
End: 2024-01-05
Payer: COMMERCIAL

## 2024-01-05 DIAGNOSIS — N20.0 BILATERAL NEPHROLITHIASIS: ICD-10-CM

## 2024-01-05 DIAGNOSIS — I10 ESSENTIAL HYPERTENSION: ICD-10-CM

## 2024-01-05 DIAGNOSIS — E04.2 MULTINODULAR GOITER: ICD-10-CM

## 2024-01-05 DIAGNOSIS — N18.2 CKD (CHRONIC KIDNEY DISEASE) STAGE 2, GFR 60-89 ML/MIN: ICD-10-CM

## 2024-01-05 DIAGNOSIS — E66.01 MORBID OBESITY (HCC): ICD-10-CM

## 2024-01-05 LAB
25(OH)D3 SERPL-MCNC: 30.2 NG/ML (ref 30–100)
ALBUMIN SERPL BCP-MCNC: 4.2 G/DL (ref 3.5–5)
ANION GAP SERPL CALCULATED.3IONS-SCNC: 12 MMOL/L
BUN SERPL-MCNC: 17 MG/DL (ref 5–25)
CALCIUM SERPL-MCNC: 9.9 MG/DL (ref 8.4–10.2)
CHLORIDE SERPL-SCNC: 102 MMOL/L (ref 96–108)
CO2 SERPL-SCNC: 26 MMOL/L (ref 21–32)
CREAT SERPL-MCNC: 1.04 MG/DL (ref 0.6–1.3)
GFR SERPL CREATININE-BSD FRML MDRD: 81 ML/MIN/1.73SQ M
GLUCOSE P FAST SERPL-MCNC: 104 MG/DL (ref 65–99)
PHOSPHATE SERPL-MCNC: 3.7 MG/DL (ref 2.7–4.5)
POTASSIUM SERPL-SCNC: 4.3 MMOL/L (ref 3.5–5.3)
SODIUM SERPL-SCNC: 140 MMOL/L (ref 135–147)

## 2024-01-05 PROCEDURE — 36415 COLL VENOUS BLD VENIPUNCTURE: CPT

## 2024-01-05 PROCEDURE — 80069 RENAL FUNCTION PANEL: CPT

## 2024-01-05 PROCEDURE — 82306 VITAMIN D 25 HYDROXY: CPT

## 2024-01-16 ENCOUNTER — TELEMEDICINE (OUTPATIENT)
Dept: NEPHROLOGY | Facility: CLINIC | Age: 54
End: 2024-01-16
Payer: COMMERCIAL

## 2024-01-16 ENCOUNTER — TELEPHONE (OUTPATIENT)
Dept: NEPHROLOGY | Facility: CLINIC | Age: 54
End: 2024-01-16

## 2024-01-16 VITALS
HEART RATE: 78 BPM | SYSTOLIC BLOOD PRESSURE: 133 MMHG | TEMPERATURE: 97.3 F | WEIGHT: 286 LBS | DIASTOLIC BLOOD PRESSURE: 83 MMHG | HEIGHT: 70 IN | OXYGEN SATURATION: 96 % | BODY MASS INDEX: 40.94 KG/M2 | RESPIRATION RATE: 20 BRPM

## 2024-01-16 DIAGNOSIS — N18.2 CKD (CHRONIC KIDNEY DISEASE) STAGE 2, GFR 60-89 ML/MIN: ICD-10-CM

## 2024-01-16 DIAGNOSIS — R82.991 HYPOCITRATURIA: ICD-10-CM

## 2024-01-16 DIAGNOSIS — I10 ESSENTIAL HYPERTENSION: ICD-10-CM

## 2024-01-16 DIAGNOSIS — E66.01 MORBID OBESITY (HCC): ICD-10-CM

## 2024-01-16 DIAGNOSIS — E04.2 MULTINODULAR GOITER: ICD-10-CM

## 2024-01-16 DIAGNOSIS — N20.0 BILATERAL NEPHROLITHIASIS: Primary | ICD-10-CM

## 2024-01-16 PROCEDURE — 99214 OFFICE O/P EST MOD 30 MIN: CPT | Performed by: INTERNAL MEDICINE

## 2024-01-16 NOTE — PATIENT INSTRUCTIONS
- Please complete 24-hour urine at your earliest convenience call me 2 weeks after to discuss results in case medications needs adjustments    - Please call me in 10 days after having your blood work done to review the results if you do not hear back from me or my office, as I may have not received the results.  - please remember to perform blood work prior to the next visit.  - Please call if the blood pressure top number is greater than 140 or less than 110 consistently.  - Please call if you are gaining more than 2lbs in 2 days for adjustment of water pills.  ~ Please AVOID the following pain medications.  LIST OF NSAIDS (NONSTEROIDAL ANTI-INFLAMMATORY DRUGS) AND CARVALHO-2 INHIBITORS    DIFLUNISAL (DOLOBID)  IBUPROFEN (MOTRIN, ADVIL)  FLURBIPROFEN (ANSAID)  KETOPROFEN (ORUDIS, ORUVAIL)  FENOPROFEN (NALFON)  NABUMETONE (RELAFEN)  PIROXICAM (FELDENE)  NAPROXEN (ALEVE, NAPROSYN, NAPRELAN, ANAPROX)  DICLOFENAC (VOLTAREN, CATAFLAM)  INDOMETHACIN (INDOCIN)  SULINDAC (CLINORIL)  TOLMETIN (TOLETIN)  ETODOLAC (LODINE)  MELOXICAM (MOBIC)  KETOROLAC (TORADOL)  OXAPROZIN (DAYPRO)  CELECOXIB (CELEBREX)    Things to do to reduce your blood pressure include working with all your physician to do the following:  ~ stop smoking if you smoke.  ~ increase cardiovascular exercise like walking and swimming.   ~ modify your diet to decrease fat and salt intake.  ~ reduce your weight if you are overweight or obese.  ~ increase the consumption of fruits, vegetables and whole grains.  ~ decrease alcohol consumption if you consume alcohol.   ~ try to minimize stress in your life with lifestyle modifications.   ~ be compliant with your anti-hypertensive medications.   ~ adjust your medications to help improve your vascular stiffness and decrease risks for heart attacks and strokes. Kidney Stone Prevention    Fluid Intake    A high fluid intake is one of the most important cornerstones of kidney stone dietary prevention. A sufficiently dilute  "urine will prevent the individual chemical components of stones from becoming concentrated enough to precipitate out of solution, keeping them instead in their dissolved state. A high urine output also may reduce stone from forming through \"flushing\" out of stone components and prevention of urine stagnation. In addition to stone benefits, increased water intake has been shown to have a multitude of other benefits, including improved alertness, better skin appearance, enhanced physical performance, reduced constipation and enhanced weight loss.    The average daily urine output for normal healthy adults is 1.2 liters a day, ranging from 1 to 2 liters in most individuals and varying with body weight and gender. In stone formers, however, a higher daily urine output is required for stone prevention and achieving a daily volume of at least 2.0 to 2.5 liters a day can significantly reduce the recurrence of future stones. In a randomized study of stone formers who were given specific instructions to increase their fluid intake compared to stone formers told to not change their diet, those given specific fluid instructions achieved a high urine output of 2.6 liters a day versus 1.0 liters a day in those not given dietary instructions. Over a period of five years, the high fluid intake group was half as likely to form new stones as compared to the normal fluid intake group (Dandy et al, J Urol 1996).    We recommend that most stone formers increase their daily fluid intake by one liter (an additional two 16 oz water bottles or two tall glasses a day) in order to achieve a urine output of 2.5 liters a day. (One liter = 4.2 8-oz glasses or 34 oz). Alternatively, a 24 hour urine collection can be performed to guide fluid intake to achieve 2.5 liters of urine output in a specific patient.    Type of Fluid Intake    The type of fluid intake is generally less important than the total intake. While drinking water is our " preferred recommendation because it is inexpensive and contains no calories, for stone patients who do not enjoy drinking water, any beverage will be beneficial in reducing stone risk.    Contrary to popular belief, studies have found that an increased intake of tea, coffee, and alcoholic beverage actually reduces the risk of stones, possibly because of an associated increase in urine output (Donta catherine al, Am J of Epidemiology, 1996). While tea contains high levels of oxalate, this does not appear to result in increased stone formation for the reasons discussed below in discussion on oxalate.    Soda intake (including theodora) and milk intake also do not appear to increase the risk of stones.    Citrus Fruit Juices    Citrus juices, including lemon juice and orange juice, contain citrate, which acts as a stone inhibitor for calcium based stones. Citrate seems to do this by binding calcium, making it unavailable to combine with oxalate or phosphate: a necessary first step in the formation of stones. Citrate also seems to make it more difficult for stones to grow once they've formed.    Drinking citrus juice in the form of concentrated lemon juice mixed with water has been shown to effectively increase urinary citrate levels and reduce urinary calcium levels, both of which will reduce stone risk. Orange juice will similarly increase urinary citrate levels. However, orange juice appears to also increase urinary oxalate levels ( a stone promoter). Other sources of citrate, including grapefruit juice, have had less research completed confirming their beneficial effects on urinary citrate levels. Therefore, lemon juice is typically favored over the other citrus juices as a natural method to increase urinary citrate levels. Many patients find drinking citrus juices to be an attractive alternative to pharmaceutical treatment with potassium citrate.    We recommend that stone formers consider supplementing their daily fluid  intake with a mixture of 60 ml of concentrated lemon juice in one liter of water to increase their urinary citrate levels.    Salt    A high sodium intake increases the risk of stone formation by increasing calcium levels and decreasing citrate (a stone inhibitor) levels in urine. Additionally, high sodium intake will impair the ability of medications such as hydrochlorothiazide to effectively reduce calcium levels in urine. A study of stone formers who were kept on a strict diet with a maximum daily sodium intake of 50 mmol (1200 mg) in addition to a reduced protein diet demonstrated that the low sodium diet was effective in reducing stone recurrence by 50% as compared to the low calcium diet.    We recommend that stone formers aim to follow the FDA's guideline of limiting salt intake to 2300 mg of sodium a day in the general population and 1500 mg of sodium a day in those with hypertension,  Americans, or middle aged and older adults. 2300 mg is equivalent to about 1 teaspoon of table salt.    The best way to determine the salt content of your food is to read the nutrition label. Processed foods tend to contain higher amounts of salt. Choose low sodium options whenever possible.    1 cup of canned chicken noodle soup contains 870 mg of sodium.  A fried chicken drumstick contains 310 mg of sodium.  A serving of shrimp contains 240 mg of sodium.  2 slices of white bread contains 200 mg of sodium.  15 potato chips contain 180 mg of sodium.  1 container of strawberry yogurt contains 85 mg of sodium.  1 tomato contains 20 mg of sodium.  1 apple contains 0 mg of sodium.    In addition to lowering the risk of stones, a low sodium intake helps to control or prevent high blood pressure, which can lead to heart disease, stroke, heart failure, and kidney disease.    Animal Protein    Animal protein in meat products increases the risk of stone by increasing calcium, oxalate, and uric acid levels in urine. All three of  these changes increase the risk of stones. In studies comparing high meat eaters versus low meat eaters, high meat eaters were found to be at increased risk of forming stones. A randomized study of stone formers restricted to a low meat intake of 52 grams a day (equivalent to 8 oz of beef) in combination with sodium restriction found that the combination reduced stone recurrence by 50% compared to calcium restriction alone (Dandy et al, [facility] 2002).    We recommend that most stone formers try to reduce their meat intake to 6 oz a day. This includes all types of meat: beef, pork, poultry, and seafood.    The USDA has recommended a daily allowance of 5-6 oz of protein intake among adults. They also recommend choosing non-meat protein foods such as nuts and beans instead of meat sources. Protein from non-meat sources does not appear to increase the risk of stones.    A small steak contains about 3-4 oz of protein.  A quarter pound hamburger with cheese contains 4 oz of protein.  A chicken breast contains about 5 oz of protein, a chicken thigh about 2.5 oz, a chicken drumstick about 1.5 oz.  One 5 oz can of tuna contains 5 oz of protein.  1 medium egg contains 1 oz of protein.    Lowering your animal protein intake and eating more fruits and vegetables also benefits your overall health by limiting the amount of saturated fats and cholesterol in your diet. This helps to reduce your risk of cardiovascular disease.    Oxalate    While oxalate plays an important role in the development of calcium oxalate stones, dietary restriction does not appear to be effective in reducing the risk of stones in the majority of patients. About 40% of urinary oxalate comes from dietary sources while the remainder is naturally made within the liver. Therefore, reducing oxalate dietary intake does not always have a significant impact on total urinary oxalate levels.    Oxalate is found in many vegetable and fruits, including many healthy  dietary choices often making it difficult to achieve a low oxalate diet. Because of these issues, oxalate avoidance is beneficial primarily in those individuals with specific abnormalities that lead to high oxalate urinary levels.    We recommend that most stone formers should maintain a normal oxalate intake without the need for oxalate restriction. High oxalate intake should be avoided in individuals found to have high urinary oxalate levels on metabolic evaluation. Oxalate restriction may be beneficial in certain individuals with high urinary oxalate levels.    Oxalate Rich Foods    Tea (black)  Spinach  Mustard Greens  Chard  Beets  Rhubarb  Okra  Berries  Chocolate  Nuts  Sweet Potatoes        Calcium    Kidney Stone formers often question whether to stop or reduce their calcium intake. Despite the fact that calcium is a major component of 75% of stones, excessive calcium intake is vary rarely the cause of stone formation. In fact, several studies have shown that restricting calcium intake in most stone formers actually increases the number of stones they develop. This appears to happen because when less calcium is ingested, it becomes easier for oxalate (which normally binds with calcium in the gut) to be absorbed. Higher levels of oxalate in the urine then lead to an increase in stone risk. Calcium obtained from dietary sources appears to be better than calcium from supplements in regards to lowering stone risk because supplements can actually increase your risk of stones slightly (by 17%) while dietary calcium intake is instead associated with lower stone risk. If you need to take supplements, taking them during meals appear to be better in terms of stone risk.    We recommend that stone formers maintain a normal calcium intake, preferably from dietary sources. Female stone formers taking calcium supplementation to prevent osteoporosis experience a slightly increased risk of stones (17%) which needs to be  balanced against their risk of osteoporosis.    Exercise to Lose Weight     Exercise and a healthy diet may help you lose weight. Your doctor may suggest specific exercises.     EXERCISE IDEAS AND TIPS     Choose low-cost things you enjoy doing, such as walking, bicycling, or exercising to workout videos.   Take stairs instead of the elevator.   Walk during your lunch break.   Park your car further away from work or school.   Go to a gym or an exercise class.   Start with 5 to 10 minutes of exercise each day. Build up to 30 minutes of exercise 4 to 6 days a week.   Wear shoes with good support and comfortable clothes.   Stretch before and after working out.   Work out until you breathe harder and your heart beats faster.   Drink extra water when you exercise.   Do not do so much that you hurt yourself, feel dizzy, or get very short of breath.     Exercises that burn about 150 calories:   Running 1 ½ miles in 15 minutes.   Playing volleyball for 45 to 60 minutes.   Washing and waxing a car for 45 to 60 minutes.   Playing touch football for 45 minutes.   Walking 1 ¾ miles in 35 minutes.   Pushing a stroller 1 ½ miles in 30 minutes.   Playing basketball for 30 minutes.   Raking leaves for 30 minutes.   Bicycling 5 miles in 30 minutes.   Walking 2 miles in 30 minutes.   Dancing for 30 minutes.   Shoveling snow for 15 minutes.   Swimming laps for 20 minutes.   Walking up stairs for 15 minutes.   Bicycling 4 miles in 15 minutes.   Gardening for 30 to 45 minutes.   Jumping rope for 15 minutes.   Washing windows or floors for 45 to 60 minutes.

## 2024-01-16 NOTE — PROGRESS NOTES
Virtual Regular Visit    Verification of patient location:    Patient is located at Home in the following state in which I hold an active license [place]      Assessment/Plan:    Problem List Items Addressed This Visit     Bilateral nephrolithiasis - Primary    Relevant Orders    Stone risk profile    Renal function panel    Vitamin D 25 hydroxy    Essential hypertension    Relevant Orders    Stone risk profile    Renal function panel    Vitamin D 25 hydroxy    Morbid obesity (HCC)    Relevant Orders    Stone risk profile    Renal function panel    Vitamin D 25 hydroxy    Multinodular goiter    Relevant Orders    Stone risk profile    Renal function panel    Vitamin D 25 hydroxy    CKD (chronic kidney disease) stage 2, GFR 60-89 ml/min    Relevant Orders    Stone risk profile    Renal function panel    Vitamin D 25 hydroxy    Hypocitraturia    Relevant Orders    Stone risk profile    Renal function panel    Vitamin D 25 hydroxy              Reason for visit is   Chief Complaint   Patient presents with   • Follow-up   • Chronic Kidney Disease          Encounter provider Pita Bear MD    Provider located at [address] [place] [address] [facility] [address] [address]      Recent Visits  No visits were found meeting these conditions.  Showing recent visits within past 7 days and meeting all other requirements  Today's Visits  Date Type Provider Dept   01/16/24 Telephone Rosa Brar [facility]   01/16/24 Telemedicine Pita Bear MD [facility]   Showing today's visits and meeting all other requirements  Future Appointments  No visits were found meeting these conditions.  Showing future appointments within next 150 days and meeting all other requirements       The patient was identified by name and date of birth. Valentin Diaz was informed that this is a telemedicine visit and that the visit is being conducted through the [facility]  platform. He agrees to proceed..  My office door was closed. No one else was in the room.  He acknowledged consent and understanding of privacy and security of the video platform. The patient has agreed to participate and understands they can discontinue the visit at any time.    Patient is aware this is a billable service.     Subjective  Valentin Diaz is a 53 y.o. male participated in virtual care with me today secondary to bad weather was not able to drive and.      HPI     Past Medical History:   Diagnosis Date   • CPAP (continuous positive airway pressure) dependence    • GERD (gastroesophageal reflux disease)    • Herpes zoster without complication 06/04/2019   • Hypertension    • Irregular heart beat     SVT   • Kidney stone    • Multinodular goiter 12/12/2018   • Seasonal allergies    • Sleep apnea        Past Surgical History:   Procedure Laterality Date   • TN LITHOTRIPSY XTRCORP SHOCK WAVE Left 4/27/2023    Procedure: ESWL;  Surgeon: Pan Felipe MD;  Location: [facility] OR;  Service: Urology   • US GUIDED MSK PROCEDURE  4/23/2019   • US GUIDED THYROID BIOPSY  12/28/2018       Current Outpatient Medications   Medication Sig Dispense Refill   • allopurinol (ZYLOPRIM) 100 mg tablet Take 1 tablet (100 mg total) by mouth daily 90 tablet 3   • diltiazem (CARDIZEM CD) 180 mg 24 hr capsule Take 1 capsule (180 mg total) by mouth daily 90 capsule 1   • fluticasone (FLONASE) 50 mcg/act nasal spray 1 spray into each nostril daily PRN     • lansoprazole (PREVACID) 30 mg capsule Take 1 capsule (30 mg total) by mouth as needed (LPR) 90 capsule 1   • lisinopril-hydrochlorothiazide (PRINZIDE,ZESTORETIC) 20-25 MG per tablet Take 1 tablet by mouth daily 90 tablet 1   • Multiple Vitamins-Minerals (multivitamin with minerals) tablet Take 1 tablet by mouth daily     • Potassium Citrate ER 15 MEQ (1620 MG) TBCR Take 1 tablet by mouth 2 (two) times a day with meals 180 tablet 3     No current facility-administered  "medications for this visit.        No Known Allergies    Review of Systems   Constitutional:  Negative for chills and fever.   HENT:  Negative for congestion and sore throat.    Respiratory:  Negative for cough and wheezing.    Cardiovascular:  Negative for leg swelling.   Gastrointestinal:  Negative for constipation and diarrhea.   Genitourinary:  Negative for difficulty urinating, dysuria and hematuria.   Musculoskeletal:  Negative for back pain.   Neurological:  Negative for dizziness, light-headedness and headaches.   Psychiatric/Behavioral:  Negative for agitation and confusion.    All other systems reviewed and are negative.      Video Exam    Vitals:    01/16/24 0832   BP: 133/83   BP Location: Left arm   Patient Position: Sitting   Cuff Size: Large   Pulse: 78   Resp: 20   Temp: (!) 97.3 °F (36.3 °C)   SpO2: 96%   Weight: 130 kg (286 lb)   Height: 5' 10\" (1.778 m)       Physical Exam  Vitals reviewed.   Constitutional:       General: He is not in acute distress.     Appearance: Normal appearance. He is obese. He is not ill-appearing, toxic-appearing or diaphoretic.      Comments: Physical exam performed by the patient on my behalf with my guidance as I did virtual visualization since this was a virtual visit through [employer].       HENT:      Head: Normocephalic and atraumatic.   Eyes:      General: No scleral icterus.  Cardiovascular:      Pulses: Normal pulses.   Pulmonary:      Effort: Pulmonary effort is normal. No respiratory distress.      Breath sounds: No wheezing.   Abdominal:      Palpations: There is no mass.      Tenderness: There is no abdominal tenderness.   Musculoskeletal:         General: No swelling.      Cervical back: No rigidity.   Skin:     Coloration: Skin is not jaundiced.   Neurological:      General: No focal deficit present.      Mental Status: He is alert and oriented to person, place, and time. Mental status is at baseline.   Psychiatric:         Mood and Affect: Mood normal.       "   Behavior: Behavior normal.        Assessment/plan  53-year-old male with multiple comorbidities morbid obesity, GERD, YELENA on CPAP, paroyxsmal afib, multinodular goiter, hypertension (>10yrs), gout and nephrolithiasis for routine follow-up.      CKD stage 2:  - After review of records in [facility] as well as Care everywhere patient has a baseline creatinine of 0.9-1.1 mg/dL as far back as 2018. Most recent labs show a Creatinine of 1.04 mg/dL on 1/5/24. Renal function remains stable.   -Likely has underlying CKD secondary to age-related nephron loss plus obesity due to hyperfiltration plus hypertensive nephrosclerosis plus renal scarring from nephrolithiasis.  Also there is some degree of underestimation of renal function secondary to patient's muscular body habitus  -CT abdomen 2/10/2023 right kidney multiple nonobstructing intrarenal calculi measuring up to 5 mm no hydronephrosis.  Left kidney multiple nonobstructing midpole calculi measuring up to 8 mm no hydronephrosis.  - Acid base and lytes stable.  - Clinically the patient appears to be euvolemic  - Recommend to avoid use of NSAIDs, nephrotoxins. Caution advised with regards to exposure to IV contrast dye.   - Discussed with the patient in depth his renal status, including the possible etiologies for CKD.   - Advised the patient that when his GFR is close to 20mL/min then will start discussing about RRT(renal replacement therapy) options such as renal transplant, peritoneal dialysis and hemodialysis.   - Informed the patient about the various options for Renal Replacement therapy.  - Discussed with the patient how we need to work together to delay the progression of CKD with optimal BP control based on their age and co-morbidities, optimal BS control with HbA1c of <7% and trying to reduce proteinuria by the use of anti-proteinuric agents.     Hypertension:  - Patient is on Cardizem 180 mg p.o. daily, lisinopril/HCTZ 20/25 mg p.o. daily.   -No changes to  medications at this time blood pressure stable at home  - Goal BP of <  130/80 based on age and comorbidities  - Instructed to follow low sodium (2gm)diet.  - Advised to hold ACEI/ARBs if patient suffers from dehydration due to gastrointestinal losses due to risk of WAYNE secondary to failure to autoregulate.    Hemoglobin:  - Goal Hb of 10-12 g/dL  - Most recent labs suggestive of 14.7 g/dL.   -No role for IV iron at this time    CKD-MBD(Mineral Bone Disease):  - Based on patients CKD stage following is the goal of therapy.  - Maintain calcium phosphorus product of < 55.  - Continue patient on vitamin D  - Patients' most recent vitamin D level is 30.2 as of 1/5/24    Proteinuria:  - proteinuria most likely secondary to obesity related hyperfiltration   -SPEP UPEP free light chain ratio within normal limits   - most recent microalbumin creatinine ratio 21 as of 6/6/2023  - check protein creatinine ratio  - currently on therapy for proteinuria with Cardizem, lisinopril    Lipids:  - goal LDL less than 70  - Management as per PCP    Nephrolithiasis:  -Most recent stone from 5/25/2023, 100% calcium oxalate monohydrate composition.  - Discussed with the patient that the most common types of kidney stones are calcium oxalate stones.   - Advised to follow a diet with increased fluid intake so that urine output is greater than 2-2.5L/day.  - Advised patient to decrease salt, protein and oxalate intake.   - Dietary handouts given.  -I PTH within normal limits, serum uric acid 8.9  -24 urine from 7/27/2023 hypocitraturia, good urine volumes elevated urine phosphorus left  -Was started on potassium citrate 15 mEq p.o. twice daily from 8/15/2023  - Ordered for 24 hr urine testing with [facility] stone workup analysis to be ordered after the visit and if potassium citrate dosage is changed then to repeat in 6 months.   - advised patient to call 2 weeks after it is completed to review and discuss the results  -Continue follow-up  with urology    Multinodular goiter:  - Management as per primary team  -Follow-up with endocrinology last seen 12/5/2022 notes reviewed, they are recommending surgery.    Nutrition/morbid obesity:  - Encouraged patient to follow a renal diet comprising of moderate potassium, low phosphorus and protein restriction to 0.8gm/kg.  Advised of dietary habits and weight loss.  - Will check serum albumin with next blood work.     Followup:  - Patient is to follow-up in 12 months, with 24-hour urine to be performed to the visit in a a few days prior to the next visit.  Advised patient to call me in 10 days to review the results if they do not hear back from me, as I may have not received the results.    Visit Time  Total Visit Duration: 15 min    Pita Bear MD, FASN, 1/16/2024, 8:55 AM

## 2024-02-01 ENCOUNTER — APPOINTMENT (OUTPATIENT)
Dept: LAB | Facility: CLINIC | Age: 54
End: 2024-02-01
Payer: COMMERCIAL

## 2024-02-08 ENCOUNTER — NURSE TRIAGE (OUTPATIENT)
Age: 54
End: 2024-02-08

## 2024-02-08 ENCOUNTER — APPOINTMENT (OUTPATIENT)
Dept: RADIOLOGY | Age: 54
End: 2024-02-08
Payer: COMMERCIAL

## 2024-02-08 ENCOUNTER — TELEPHONE (OUTPATIENT)
Dept: NEPHROLOGY | Facility: CLINIC | Age: 54
End: 2024-02-08

## 2024-02-08 ENCOUNTER — APPOINTMENT (OUTPATIENT)
Dept: LAB | Age: 54
End: 2024-02-08
Payer: COMMERCIAL

## 2024-02-08 DIAGNOSIS — N20.0 BILATERAL NEPHROLITHIASIS: Primary | ICD-10-CM

## 2024-02-08 DIAGNOSIS — R39.9 UTI SYMPTOMS: ICD-10-CM

## 2024-02-08 DIAGNOSIS — N20.0 BILATERAL NEPHROLITHIASIS: ICD-10-CM

## 2024-02-08 LAB
AMMONIA UR-SCNC: 41 MMOL/24 HR (ref 15–60)
BACTERIA UR QL AUTO: ABNORMAL /HPF
BILIRUB UR QL STRIP: NEGATIVE
CA H2 PHOS DIHYD 24H SATFR UR: 0.79 (ref 0.5–2)
CALCIUM 24H UR-MRATE: 140 MG/24 HR
CALCIUM/CREAT UR: 65 MG/G CREAT (ref 34–196)
CALCIUM/KG BODY WEIGHT: 1.1 MG/24 HR/KG
CAOX INDEX 24H UR-RTO: 1.97 (ref 6–10)
CHLORIDE 24H UR-SRATE: 210 MMOL/24 HR (ref 70–250)
CITRATE 24H UR-MCNC: 995 MG/24 HR
CLARITY UR: CLEAR
COLOR UR: ABNORMAL
COMMENT: ABNORMAL
CREAT UR-MCNC: 2144 MG/24 HR
CREAT/BW 24H UR-RELMRAT: 16.6 MG/24 HR/KG (ref 11.9–24.4)
CYSTINE 24H UR-MCNC: ABNORMAL MG/DL
GLUCOSE UR STRIP-MCNC: NEGATIVE MG/DL
HGB UR QL STRIP.AUTO: ABNORMAL
KETONES UR STRIP-MCNC: NEGATIVE MG/DL
LEUKOCYTE ESTERASE UR QL STRIP: NEGATIVE
MAGNESIUM 24H UR-MRATE: 155 MG/24 HR (ref 30–120)
MUCOUS THREADS UR QL AUTO: ABNORMAL
NITRITE UR QL STRIP: NEGATIVE
NON-SQ EPI CELLS URNS QL MICRO: ABNORMAL /HPF
OXALATE UR-MCNC: 42 MG/24 HR (ref 20–40)
PH 24H UR: 6.49 [PH] (ref 5.8–6.2)
PH UR STRIP.AUTO: 6.5 [PH]
PHOSPHATE 24H UR-MRATE: 1770 MG/24 HR (ref 600–1200)
POTASSIUM 24H UR-SCNC: 130 MMOL/24 HR (ref 20–100)
PROT UR STRIP-MCNC: NEGATIVE MG/DL
PROTEIN CATABOLIC RATE 24H UR-MRATE: 1.1 G/KG/24 HR (ref 0.8–1.4)
RBC #/AREA URNS AUTO: ABNORMAL /HPF
SODIUM 24H UR-SRATE: 225 MMOL/24 HR (ref 50–150)
SP GR UR STRIP.AUTO: 1.01 (ref 1–1.03)
SPECIMEN VOL 24H UR: 3320 ML/24 HR (ref 500–4000)
SULFATE 24H UR-SRATE: 63 MEQ/24 HR (ref 20–80)
URATE 24H SATFR UR: 0.17
URATE 24H UR-MRATE: 762 MG/24 HR
UROBILINOGEN UR STRIP-ACNC: <2 MG/DL
UUN 24H UR-MRATE: 18.81 G/24 HR (ref 6–14)
WBC #/AREA URNS AUTO: ABNORMAL /HPF

## 2024-02-08 PROCEDURE — 87086 URINE CULTURE/COLONY COUNT: CPT

## 2024-02-08 PROCEDURE — 81001 URINALYSIS AUTO W/SCOPE: CPT

## 2024-02-08 PROCEDURE — 74018 RADEX ABDOMEN 1 VIEW: CPT

## 2024-02-08 NOTE — TELEPHONE ENCOUNTER
Pt of Mouna BEAULIEU in [place]. Last seen in 05/25/23 for bilateral nephrolithiasis. Plan: follow up and KUB in 1 year    Patient called reports having flank pain and  nausea. Reports pain is about level 3. Pt does have history of kidney stone and concerned having a flair up. Pt denies fever or chills or issues with urination at this time. Pt does have order in system for KUB. Advised I can place urine orders and he can do the KUB at this time. ER precautions reviewed. Advised can take tylenol or motrin over the counter for discomfort. Pt reports neph advised to avoid NSAID. Recommended to try tylenol if no relief contact Neph and get the ok to try motrin. Pt states understanding. KUB order updated due to expected date.   Reason for Disposition  • The patient has mild /moderate pain, a history of kidney stones, and no recent imaging    Protocols used: FLANK PAIN / KIDNEY STONES / HYDRONEPHROSIS

## 2024-02-08 NOTE — TELEPHONE ENCOUNTER
----- Message from Pita Bear MD sent at 2/8/2024  8:22 AM EST -----  Please advise patient I have reviewed his 24 urine results.  Continue to keep up the fluid intake he is doing a good job.  His citrate level is good so continue the same citrate dosage no changes.  However his urine sodium and oxalate levels are up so please advise him to follow low-sodium diet.  Also to follow low oxalate diet to avoid foods such as tomatoes not spinach dark chocolates.  Further details of oxalate rich foods are in the handouts that I have already provided him at the visit.  Please have him repeat another 24-hour urine 2 weeks before his next appointment with us

## 2024-02-08 NOTE — TELEPHONE ENCOUNTER
Left a detailed message for Bertin, Citrate level is stable continue same dosage without change.   - low sodium and lo oxalate diets mailed with [employer] form.

## 2024-02-09 LAB — BACTERIA UR CULT: NORMAL

## 2024-04-09 ENCOUNTER — OFFICE VISIT (OUTPATIENT)
Dept: UROLOGY | Facility: AMBULATORY SURGERY CENTER | Age: 54
End: 2024-04-09
Payer: COMMERCIAL

## 2024-04-09 VITALS
OXYGEN SATURATION: 95 % | DIASTOLIC BLOOD PRESSURE: 82 MMHG | HEIGHT: 70 IN | BODY MASS INDEX: 40.94 KG/M2 | WEIGHT: 286 LBS | SYSTOLIC BLOOD PRESSURE: 134 MMHG | HEART RATE: 84 BPM

## 2024-04-09 DIAGNOSIS — R39.9 UTI SYMPTOMS: Primary | ICD-10-CM

## 2024-04-09 DIAGNOSIS — N20.0 BILATERAL NEPHROLITHIASIS: ICD-10-CM

## 2024-04-09 PROCEDURE — 81002 URINALYSIS NONAUTO W/O SCOPE: CPT

## 2024-04-09 PROCEDURE — 99213 OFFICE O/P EST LOW 20 MIN: CPT

## 2024-04-09 NOTE — PROGRESS NOTES
Office Visit- Urology  Valentin Diaz 1970 MRN: [id number]      Assessment/Discussion/Plan    53 y.o. male managed by     Nephrolithiasis  -CT stone study in February 2023 demonstrated multiple nonobstructing intrarenal calculi measuring up to 5 mm  -S/p ESWL in April 2023 with no significant change in stone burden  -Most recent abdominal imaging with a KUB demonstrated bilateral calculi measuring up to 8 mm  -Patient follows with nephrology and is maintained on potassium citrate 15 mEq p.o. twice daily  -Offered elective stone clearance surgery with ureteroscopy but patient defers at this point in time  -Plan for repeat ultrasound/KUB in 1 year    2.  Prostate cancer screening  -Last PSA was 1.32 in June 2023.  Due for updated PSA June 2024-will call patient presents  -DEANNA today without palpable concern for prostate cancer    Chief Complaint:   Valentin is a 53 y.o. male presenting to the office for a follow up visit regarding nephrolithiasis s/p prostate cancer screening        Subjective    Patient is a 53-year-old male with a history of nephrolithiasis.  He had a CT scan demonstrated bilateral obstructing stones up to 5 mm on the right 8 mm on the left.  Radiolucent on KUB he underwent left ESWL in April 2023 but follow-up KUB did not demonstrate significant change in degree of stone burden.  Patient did subsequently pass some stones.  He remains asymptomatic though he did have an episode in February where he had acute flank pain.  KUB at that point in time did not demonstrate evidence of a ureteral stone.  His flank discomfort has since subsided.  No bothersome urinary tract symptoms.  He had a PSA 1.32 in June 2023.      AUA SYMPTOM SCORE      Flowsheet Row Most Recent Value   AUA SYMPTOM SCORE    How often have you had a sensation of not emptying your bladder completely after you finished urinating? 0 (P)    How often have you had to urinate again less than two hours after you finished urinating? 1 (P)     How often have you found you stopped and started again several times when you urinate? 0 (P)    How often have you found it difficult to postpone urination? 0 (P)    How often have you had a weak urinary stream? 0 (P)    How often have you had to push or strain to begin urination? 0 (P)    How many times did you most typically get up to urinate from the time you went to bed at night until the time you got up in the morning? 1 (P)    Quality of Life: If you were to spend the rest of your life with your urinary condition just the way it is now, how would you feel about that? 2 (P)    AUA SYMPTOM SCORE 2 (P)             ROS:   Review of Systems   Constitutional: Negative.  Negative for chills, fatigue and fever.   HENT: Negative.     Respiratory:  Negative for shortness of breath.    Cardiovascular:  Negative for chest pain.   Gastrointestinal: Negative.  Negative for abdominal pain.   Endocrine: Negative.    Musculoskeletal: Negative.    Skin: Negative.    Neurological: Negative.  Negative for dizziness and light-headedness.   Hematological: Negative.    Psychiatric/Behavioral: Negative.           Past Medical History  Past Medical History:   Diagnosis Date    CPAP (continuous positive airway pressure) dependence     GERD (gastroesophageal reflux disease)     Herpes zoster without complication 06/04/2019    Hypertension     Irregular heart beat     SVT    Kidney stone     Multinodular goiter 12/12/2018    Seasonal allergies     Sleep apnea        Past Surgical History  Past Surgical History:   Procedure Laterality Date    AR LITHOTRIPSY XTRCORP SHOCK WAVE Left 4/27/2023    Procedure: ESWL;  Surgeon: Pan Felipe MD;  Location:  MAIN OR;  Service: Urology    US GUIDED MSK PROCEDURE  4/23/2019    US GUIDED THYROID BIOPSY  12/28/2018       Past Family History  Family History   Problem Relation Age of Onset    Hyperlipidemia Mother     Heart block Mother     Stroke Mother     Hypertension Mother     Kidney  nephrosis Father     Nephrolithiasis Father     Nephrolithiasis Brother     Stroke Maternal Grandmother     No Known Problems Maternal Grandfather     Cancer Paternal Grandmother     Heart disease Paternal Grandfather        Past Social history  Social History     Socioeconomic History    Marital status:      Spouse name: Not on file    Number of children: Not on file    Years of education: Not on file    Highest education level: Not on file   Occupational History    Not on file   Tobacco Use    Smoking status: Never    Smokeless tobacco: Never   Vaping Use    Vaping status: Never Used   Substance and Sexual Activity    Alcohol use: Yes     Alcohol/week: 1.0 standard drink of alcohol     Types: 1 Cans of beer per week     Comment: Occasionally    Drug use: No    Sexual activity: Not Currently   Other Topics Concern    Not on file   Social History Narrative    Not on file     Social Determinants of Health     Financial Resource Strain: Not on file   Food Insecurity: Not on file   Transportation Needs: Not on file   Physical Activity: Not on file   Stress: Not on file   Social Connections: Not on file   Intimate Partner Violence: Not on file   Housing Stability: Not on file       Current Medications  Current Outpatient Medications   Medication Sig Dispense Refill    allopurinol (ZYLOPRIM) 100 mg tablet Take 1 tablet (100 mg total) by mouth daily 90 tablet 3    diltiazem (CARDIZEM CD) 180 mg 24 hr capsule Take 1 capsule (180 mg total) by mouth daily 90 capsule 1    fluticasone (FLONASE) 50 mcg/act nasal spray 1 spray into each nostril daily PRN      lansoprazole (PREVACID) 30 mg capsule Take 1 capsule (30 mg total) by mouth as needed (LPR) 90 capsule 1    lisinopril-hydrochlorothiazide (PRINZIDE,ZESTORETIC) 20-25 MG per tablet Take 1 tablet by mouth daily 90 tablet 1    Multiple Vitamins-Minerals (multivitamin with minerals) tablet Take 1 tablet by mouth daily      Potassium Citrate ER 15 MEQ (1620 MG) TBCR  "Take 1 tablet by mouth 2 (two) times a day with meals 180 tablet 3     No current facility-administered medications for this visit.       Allergies  No Known Allergies    OBJECTIVE    Vitals   Vitals:    04/09/24 0825   BP: 134/82   BP Location: Left arm   Patient Position: Sitting   Cuff Size: Standard   Pulse: 84   SpO2: 95%   Weight: 130 kg (286 lb)   Height: 5' 10\" (1.778 m)       PVR:    Physical Exam  Constitutional:       General: He is not in acute distress.     Appearance: Normal appearance. He is normal weight. He is not ill-appearing or toxic-appearing.   HENT:      Head: Normocephalic and atraumatic.   Eyes:      Conjunctiva/sclera: Conjunctivae normal.   Cardiovascular:      Rate and Rhythm: Normal rate.   Pulmonary:      Effort: Pulmonary effort is normal. No respiratory distress.   Genitourinary:     Comments: On prostate exam no asymmetry, induration, nodularity appreciated  Skin:     General: Skin is warm and dry.   Neurological:      General: No focal deficit present.      Mental Status: He is alert and oriented to person, place, and time.      Cranial Nerves: No cranial nerve deficit.   Psychiatric:         Mood and Affect: Mood normal.         Behavior: Behavior normal.         Thought Content: Thought content normal.          Labs:     Lab Results   Component Value Date    PSA 1.32 06/06/2023     Lab Results   Component Value Date    CREATININE 1.04 01/05/2024      Lab Results   Component Value Date    HGBA1C 5.7 (H) 06/06/2023     Lab Results   Component Value Date    CALCIUM 9.9 01/05/2024    K 4.3 01/05/2024    CO2 26 01/05/2024     01/05/2024    BUN 17 01/05/2024    CREATININE 1.04 01/05/2024       I have personally reviewed all pertinent lab results and reviewed with patient    Imaging       Tree Ventura PA-C  Date: 4/9/2024 Time: 8:37 AM  [facility]    This note was written using fluency dictation software. Please excuse any resulting minor grammatical " errors.

## 2024-06-14 ENCOUNTER — APPOINTMENT (OUTPATIENT)
Dept: LAB | Age: 54
End: 2024-06-14
Payer: COMMERCIAL

## 2024-06-14 DIAGNOSIS — R73.01 IMPAIRED FASTING BLOOD SUGAR: ICD-10-CM

## 2024-06-14 DIAGNOSIS — E04.2 MULTINODULAR GOITER: ICD-10-CM

## 2024-06-14 DIAGNOSIS — I10 ESSENTIAL HYPERTENSION: ICD-10-CM

## 2024-06-14 DIAGNOSIS — Z12.5 PROSTATE CANCER SCREENING: ICD-10-CM

## 2024-06-14 DIAGNOSIS — I47.10 PAROXYSMAL SVT (SUPRAVENTRICULAR TACHYCARDIA): ICD-10-CM

## 2024-06-14 DIAGNOSIS — Z12.12 ENCOUNTER FOR COLORECTAL CANCER SCREENING: ICD-10-CM

## 2024-06-14 DIAGNOSIS — Z12.11 ENCOUNTER FOR COLORECTAL CANCER SCREENING: ICD-10-CM

## 2024-06-14 LAB
ALBUMIN SERPL BCP-MCNC: 4.2 G/DL (ref 3.5–5)
ALP SERPL-CCNC: 67 U/L (ref 34–104)
ALT SERPL W P-5'-P-CCNC: 83 U/L (ref 7–52)
ANION GAP SERPL CALCULATED.3IONS-SCNC: 11 MMOL/L (ref 4–13)
AST SERPL W P-5'-P-CCNC: 36 U/L (ref 13–39)
BILIRUB SERPL-MCNC: 0.58 MG/DL (ref 0.2–1)
BUN SERPL-MCNC: 19 MG/DL (ref 5–25)
CALCIUM SERPL-MCNC: 9.4 MG/DL (ref 8.4–10.2)
CHLORIDE SERPL-SCNC: 101 MMOL/L (ref 96–108)
CHOLEST SERPL-MCNC: 161 MG/DL
CO2 SERPL-SCNC: 27 MMOL/L (ref 21–32)
CREAT SERPL-MCNC: 0.93 MG/DL (ref 0.6–1.3)
CREAT UR-MCNC: 134.6 MG/DL
ERYTHROCYTE [DISTWIDTH] IN BLOOD BY AUTOMATED COUNT: 13.2 % (ref 11.6–15.1)
EST. AVERAGE GLUCOSE BLD GHB EST-MCNC: 123 MG/DL
GFR SERPL CREATININE-BSD FRML MDRD: 92 ML/MIN/1.73SQ M
GLUCOSE P FAST SERPL-MCNC: 121 MG/DL (ref 65–99)
HBA1C MFR BLD: 5.9 %
HCT VFR BLD AUTO: 45 % (ref 36.5–49.3)
HDLC SERPL-MCNC: 42 MG/DL
HEMOCCULT STL QL IA: NEGATIVE
HGB BLD-MCNC: 15.3 G/DL (ref 12–17)
LDLC SERPL CALC-MCNC: 108 MG/DL (ref 0–100)
MCH RBC QN AUTO: 30.5 PG (ref 26.8–34.3)
MCHC RBC AUTO-ENTMCNC: 34 G/DL (ref 31.4–37.4)
MCV RBC AUTO: 90 FL (ref 82–98)
MICROALBUMIN UR-MCNC: 49.6 MG/L
MICROALBUMIN/CREAT 24H UR: 37 MG/G CREATININE (ref 0–30)
NONHDLC SERPL-MCNC: 119 MG/DL
PLATELET # BLD AUTO: 287 THOUSANDS/UL (ref 149–390)
PMV BLD AUTO: 9.4 FL (ref 8.9–12.7)
POTASSIUM SERPL-SCNC: 4 MMOL/L (ref 3.5–5.3)
PROT SERPL-MCNC: 7 G/DL (ref 6.4–8.4)
PSA SERPL-MCNC: 1.37 NG/ML (ref 0–4)
RBC # BLD AUTO: 5.02 MILLION/UL (ref 3.88–5.62)
SODIUM SERPL-SCNC: 139 MMOL/L (ref 135–147)
TRIGL SERPL-MCNC: 55 MG/DL
TSH SERPL DL<=0.05 MIU/L-ACNC: 1.48 UIU/ML (ref 0.45–4.5)
WBC # BLD AUTO: 6.63 THOUSAND/UL (ref 4.31–10.16)

## 2024-06-14 PROCEDURE — G0328 FECAL BLOOD SCRN IMMUNOASSAY: HCPCS

## 2024-06-14 PROCEDURE — 82043 UR ALBUMIN QUANTITATIVE: CPT

## 2024-06-14 PROCEDURE — 82570 ASSAY OF URINE CREATININE: CPT

## 2024-06-14 PROCEDURE — 84443 ASSAY THYROID STIM HORMONE: CPT

## 2024-06-14 PROCEDURE — G0103 PSA SCREENING: HCPCS

## 2024-06-14 PROCEDURE — 36415 COLL VENOUS BLD VENIPUNCTURE: CPT

## 2024-06-14 PROCEDURE — 83036 HEMOGLOBIN GLYCOSYLATED A1C: CPT

## 2024-06-14 PROCEDURE — 80053 COMPREHEN METABOLIC PANEL: CPT

## 2024-06-14 PROCEDURE — 85027 COMPLETE CBC AUTOMATED: CPT

## 2024-06-14 PROCEDURE — 80061 LIPID PANEL: CPT

## 2024-06-14 RX ORDER — DILTIAZEM HYDROCHLORIDE 180 MG/1
180 CAPSULE, COATED, EXTENDED RELEASE ORAL DAILY
Qty: 90 CAPSULE | Refills: 1 | Status: SHIPPED | OUTPATIENT
Start: 2024-06-14

## 2024-06-14 RX ORDER — LISINOPRIL AND HYDROCHLOROTHIAZIDE 25; 20 MG/1; MG/1
1 TABLET ORAL DAILY
Qty: 90 TABLET | Refills: 1 | Status: SHIPPED | OUTPATIENT
Start: 2024-06-14

## 2024-07-02 ENCOUNTER — OFFICE VISIT (OUTPATIENT)
Dept: INTERNAL MEDICINE CLINIC | Facility: OTHER | Age: 54
End: 2024-07-02
Payer: COMMERCIAL

## 2024-07-02 VITALS
DIASTOLIC BLOOD PRESSURE: 74 MMHG | TEMPERATURE: 97.9 F | HEIGHT: 70 IN | SYSTOLIC BLOOD PRESSURE: 134 MMHG | OXYGEN SATURATION: 96 % | BODY MASS INDEX: 41.57 KG/M2 | RESPIRATION RATE: 20 BRPM | WEIGHT: 290.4 LBS | HEART RATE: 85 BPM

## 2024-07-02 DIAGNOSIS — E04.2 MULTINODULAR GOITER: ICD-10-CM

## 2024-07-02 DIAGNOSIS — K76.0 NAFL (NONALCOHOLIC FATTY LIVER): ICD-10-CM

## 2024-07-02 DIAGNOSIS — I47.10 PAROXYSMAL SVT (SUPRAVENTRICULAR TACHYCARDIA): ICD-10-CM

## 2024-07-02 DIAGNOSIS — K21.9 LARYNGOPHARYNGEAL REFLUX (LPR): ICD-10-CM

## 2024-07-02 DIAGNOSIS — E79.0 HYPERURICEMIA: ICD-10-CM

## 2024-07-02 DIAGNOSIS — I10 ESSENTIAL HYPERTENSION: Primary | ICD-10-CM

## 2024-07-02 DIAGNOSIS — E66.01 MORBID OBESITY (HCC): ICD-10-CM

## 2024-07-02 DIAGNOSIS — N18.2 CKD (CHRONIC KIDNEY DISEASE) STAGE 2, GFR 60-89 ML/MIN: ICD-10-CM

## 2024-07-02 DIAGNOSIS — R73.01 IMPAIRED FASTING BLOOD SUGAR: ICD-10-CM

## 2024-07-02 PROCEDURE — 99214 OFFICE O/P EST MOD 30 MIN: CPT | Performed by: INTERNAL MEDICINE

## 2024-07-02 RX ORDER — DILTIAZEM HYDROCHLORIDE 180 MG/1
180 CAPSULE, COATED, EXTENDED RELEASE ORAL DAILY
Qty: 100 CAPSULE | Refills: 3 | Status: SHIPPED | OUTPATIENT
Start: 2024-07-02

## 2024-07-02 RX ORDER — LISINOPRIL AND HYDROCHLOROTHIAZIDE 25; 20 MG/1; MG/1
1 TABLET ORAL DAILY
Qty: 100 TABLET | Refills: 3 | Status: SHIPPED | OUTPATIENT
Start: 2024-07-02

## 2024-07-02 RX ORDER — ALLOPURINOL 100 MG/1
100 TABLET ORAL DAILY
Qty: 100 TABLET | Refills: 3 | Status: SHIPPED | OUTPATIENT
Start: 2024-07-02

## 2024-07-02 NOTE — ASSESSMENT & PLAN NOTE
Lab Results   Component Value Date    EGFR 92 06/14/2024    EGFR 81 01/05/2024    EGFR 78 07/27/2023    CREATININE 0.93 06/14/2024    CREATININE 1.04 01/05/2024    CREATININE 1.07 07/27/2023   Continue to trend kidney function.  Avoid nephrotoxic agents.  Continue follow-up with nephrology.

## 2024-07-02 NOTE — PROGRESS NOTES
Assessment/Plan:    Essential hypertension  Controlled.  Continue current antihypertensive regimen.  Continue to trend kidney function.    Paroxysmal SVT (supraventricular tachycardia) (HCC)  Continue carvedilol.  Stable.    NAFL (nonalcoholic fatty liver)  Discussed diet and exercise.  Continue to trend LFTs.    Laryngopharyngeal reflux (LPR)  Continue Prevacid.    Impaired fasting blood sugar  Trend A1c.    Multinodular goiter  Stable, asymptomatic.  Continue to trend TSH.  Thyroid ultrasound ordered for surveillance.    CKD (chronic kidney disease) stage 2, GFR 60-89 ml/min  Lab Results   Component Value Date    EGFR 92 06/14/2024    EGFR 81 01/05/2024    EGFR 78 07/27/2023    CREATININE 0.93 06/14/2024    CREATININE 1.04 01/05/2024    CREATININE 1.07 07/27/2023   Continue to trend kidney function.  Avoid nephrotoxic agents.  Continue follow-up with nephrology.    Morbid obesity (HCC)  Discussed diet and exercise.       Diagnoses and all orders for this visit:    Essential hypertension  -     lisinopril-hydrochlorothiazide (PRINZIDE,ZESTORETIC) 20-25 MG per tablet; Take 1 tablet by mouth daily    Paroxysmal SVT (supraventricular tachycardia)  -     diltiazem (CARDIZEM CD) 180 mg 24 hr capsule; Take 1 capsule (180 mg total) by mouth daily    Hyperuricemia  -     allopurinol (ZYLOPRIM) 100 mg tablet; Take 1 tablet (100 mg total) by mouth daily    Laryngopharyngeal reflux (LPR)    NAFL (nonalcoholic fatty liver)  -     Comprehensive metabolic panel; Future    Multinodular goiter  -     Comprehensive metabolic panel; Future  -     US thyroid; Future  -     TSH, 3rd generation with Free T4 reflex; Future    Impaired fasting blood sugar  -     Hemoglobin A1C; Future    CKD (chronic kidney disease) stage 2, GFR 60-89 ml/min    Morbid obesity (HCC)                  Subjective:      Patient ID: Valentin Diaz is a 54 y.o. male.    Chief Complaint   Patient presents with   • Follow-up     6 month - labs done 6/14/24 - no  issues    • hm     annual       Valentin Diaz is seen today for follow up of chronic conditions.   Recent laboratory studies reviewed today with the patient. A1c stable at 5.9%.   he has been compliant with his medication regimen.   Gout: he denies any recent flares.  He has been compliant with allopurinol.  he has no complaints or concerns at this time.       Heartburn  He reports no abdominal pain, no chest pain, no choking, no coughing, no nausea, no sore throat or no wheezing. This is a chronic problem. The current episode started more than 1 year ago. The problem occurs rarely. The problem has been unchanged. Pertinent negatives include no fatigue. He has tried a PPI for the symptoms. The treatment provided moderate relief.   Hypertension  This is a chronic problem. The current episode started more than 1 year ago. The problem is unchanged. The problem is controlled. Pertinent negatives include no chest pain, headaches, palpitations or shortness of breath. Past treatments include ACE inhibitors, diuretics and beta blockers. The current treatment provides moderate improvement. There are no compliance problems.  Identifiable causes of hypertension include a thyroid problem.   Thyroid Problem  Presents for follow-up visit. Patient reports no cold intolerance, constipation, diaphoresis, diarrhea, fatigue, heat intolerance or palpitations. The symptoms have been stable.       The following portions of the patient's history were reviewed and updated as appropriate: allergies, current medications, past family history, past medical history, past social history, past surgical history, and problem list.    Review of Systems   Constitutional:  Negative for activity change, appetite change, chills, diaphoresis, fatigue and fever.   HENT:  Negative for congestion, postnasal drip, rhinorrhea, sinus pressure, sinus pain, sneezing and sore throat.    Eyes:  Negative for visual disturbance.   Respiratory:  Negative for apnea,  cough, choking, chest tightness, shortness of breath and wheezing.    Cardiovascular:  Negative for chest pain, palpitations and leg swelling.   Gastrointestinal:  Negative for abdominal distention, abdominal pain, anal bleeding, blood in stool, constipation, diarrhea, nausea and vomiting.   Endocrine: Negative for cold intolerance and heat intolerance.   Genitourinary:  Negative for difficulty urinating, dysuria and hematuria.   Musculoskeletal: Negative.    Skin: Negative.    Neurological:  Negative for dizziness, weakness, light-headedness, numbness and headaches.   Hematological:  Negative for adenopathy.   Psychiatric/Behavioral:  Negative for agitation, sleep disturbance and suicidal ideas.    All other systems reviewed and are negative.        Past Medical History:   Diagnosis Date   • Bilateral nephrolithiasis 10/01/2013   • CPAP (continuous positive airway pressure) dependence    • GERD (gastroesophageal reflux disease)    • Herpes zoster without complication 06/04/2019   • Hypertension    • Irregular heart beat     SVT   • Kidney stone    • Multinodular goiter 12/12/2018   • Seasonal allergies    • Sleep apnea          Current Outpatient Medications:   •  allopurinol (ZYLOPRIM) 100 mg tablet, Take 1 tablet (100 mg total) by mouth daily, Disp: 100 tablet, Rfl: 3  •  diltiazem (CARDIZEM CD) 180 mg 24 hr capsule, Take 1 capsule (180 mg total) by mouth daily, Disp: 100 capsule, Rfl: 3  •  fluticasone (FLONASE) 50 mcg/act nasal spray, 1 spray into each nostril daily PRN, Disp: , Rfl:   •  lansoprazole (PREVACID) 30 mg capsule, Take 1 capsule (30 mg total) by mouth as needed (LPR), Disp: 90 capsule, Rfl: 1  •  lisinopril-hydrochlorothiazide (PRINZIDE,ZESTORETIC) 20-25 MG per tablet, Take 1 tablet by mouth daily, Disp: 100 tablet, Rfl: 3  •  Multiple Vitamins-Minerals (multivitamin with minerals) tablet, Take 1 tablet by mouth daily, Disp: , Rfl:   •  Potassium Citrate ER 15 MEQ (1620 MG) TBCR, Take 1 tablet by  "mouth 2 (two) times a day with meals, Disp: 180 tablet, Rfl: 3    No Known Allergies    Social History   Past Surgical History:   Procedure Laterality Date   • NE LITHOTRIPSY XTRCORP SHOCK WAVE Left 4/27/2023    Procedure: ESWL;  Surgeon: Pan Felipe MD;  Location:  MAIN OR;  Service: Urology   • US GUIDED MSK PROCEDURE  4/23/2019   • US GUIDED THYROID BIOPSY  12/28/2018     Family History   Problem Relation Age of Onset   • Hyperlipidemia Mother    • Heart block Mother    • Stroke Mother    • Hypertension Mother    • Kidney nephrosis Father    • Nephrolithiasis Father    • Nephrolithiasis Brother    • Stroke Maternal Grandmother    • No Known Problems Maternal Grandfather    • Cancer Paternal Grandmother    • Heart disease Paternal Grandfather        Objective:  /74 (BP Location: Left arm, Patient Position: Sitting, Cuff Size: Large)   Pulse 85   Temp 97.9 °F (36.6 °C) (Temporal)   Resp 20   Ht 5' 10\" (1.778 m)   Wt 132 kg (290 lb 6.4 oz)   SpO2 96%   BMI 41.67 kg/m²     Recent Results (from the past 1344 hour(s))   CBC    Collection Time: 06/14/24  7:54 AM   Result Value Ref Range    WBC 6.63 4.31 - 10.16 Thousand/uL    RBC 5.02 3.88 - 5.62 Million/uL    Hemoglobin 15.3 12.0 - 17.0 g/dL    Hematocrit 45.0 36.5 - 49.3 %    MCV 90 82 - 98 fL    MCH 30.5 26.8 - 34.3 pg    MCHC 34.0 31.4 - 37.4 g/dL    RDW 13.2 11.6 - 15.1 %    Platelets 287 149 - 390 Thousands/uL    MPV 9.4 8.9 - 12.7 fL   Comprehensive metabolic panel    Collection Time: 06/14/24  7:54 AM   Result Value Ref Range    Sodium 139 135 - 147 mmol/L    Potassium 4.0 3.5 - 5.3 mmol/L    Chloride 101 96 - 108 mmol/L    CO2 27 21 - 32 mmol/L    ANION GAP 11 4 - 13 mmol/L    BUN 19 5 - 25 mg/dL    Creatinine 0.93 0.60 - 1.30 mg/dL    Glucose, Fasting 121 (H) 65 - 99 mg/dL    Calcium 9.4 8.4 - 10.2 mg/dL    AST 36 13 - 39 U/L    ALT 83 (H) 7 - 52 U/L    Alkaline Phosphatase 67 34 - 104 U/L    Total Protein 7.0 6.4 - 8.4 g/dL    Albumin 4.2 " 3.5 - 5.0 g/dL    Total Bilirubin 0.58 0.20 - 1.00 mg/dL    eGFR 92 ml/min/1.73sq m   Hemoglobin A1C    Collection Time: 06/14/24  7:54 AM   Result Value Ref Range    Hemoglobin A1C 5.9 (H) Normal 4.0-5.6%; PreDiabetic 5.7-6.4%; Diabetic >=6.5%; Glycemic control for adults with diabetes <7.0% %     mg/dl   Lipid panel    Collection Time: 06/14/24  7:54 AM   Result Value Ref Range    Cholesterol 161 See Comment mg/dL    Triglycerides 55 See Comment mg/dL    HDL, Direct 42 >=40 mg/dL    LDL Calculated 108 (H) 0 - 100 mg/dL    Non-HDL-Chol (CHOL-HDL) 119 mg/dl   Albumin / creatinine urine ratio    Collection Time: 06/14/24  7:54 AM   Result Value Ref Range    Creatinine, Ur 134.6 Reference range not established. mg/dL    Albumin,U,Random 49.6 (H) <20.0 mg/L    Albumin Creat Ratio 37 (H) 0 - 30 mg/g creatinine   PSA, Total Screen    Collection Time: 06/14/24  7:54 AM   Result Value Ref Range    PSA 1.366 0.000 - 4.000 ng/mL   Occult Blood, Fecal Immunochemical    Collection Time: 06/14/24  7:54 AM   Result Value Ref Range    OCCULT BLD, FECAL IMMUNOLOGICAL Negative Negative   TSH, 3rd generation with Free T4 reflex    Collection Time: 06/14/24  7:54 AM   Result Value Ref Range    TSH 3RD GENERATON 1.483 0.450 - 4.500 uIU/mL            Physical Exam  Vitals and nursing note reviewed.   Constitutional:       General: He is not in acute distress.     Appearance: He is well-developed. He is not diaphoretic.   HENT:      Head: Normocephalic and atraumatic.   Eyes:      General: No scleral icterus.        Right eye: No discharge.         Left eye: No discharge.      Conjunctiva/sclera: Conjunctivae normal.      Pupils: Pupils are equal, round, and reactive to light.   Neck:      Thyroid: No thyromegaly.      Vascular: No JVD.   Cardiovascular:      Rate and Rhythm: Normal rate and regular rhythm.      Heart sounds: Normal heart sounds. No murmur heard.     No friction rub. No gallop.   Pulmonary:      Effort: Pulmonary  effort is normal. No respiratory distress.      Breath sounds: Normal breath sounds. No wheezing or rales.   Chest:      Chest wall: No tenderness.   Abdominal:      General: Bowel sounds are normal. There is no distension.      Palpations: Abdomen is soft. There is no mass.      Tenderness: There is no abdominal tenderness. There is no guarding or rebound.   Musculoskeletal:         General: No tenderness or deformity. Normal range of motion.      Cervical back: Normal range of motion and neck supple.   Lymphadenopathy:      Cervical: No cervical adenopathy.   Skin:     General: Skin is warm and dry.      Coloration: Skin is not pale.      Findings: No erythema or rash.   Neurological:      Mental Status: He is alert and oriented to person, place, and time.      Cranial Nerves: No cranial nerve deficit.      Coordination: Coordination normal.      Deep Tendon Reflexes: Reflexes are normal and symmetric.   Psychiatric:         Behavior: Behavior normal.         Thought Content: Thought content normal.         Judgment: Judgment normal.

## 2024-07-03 DIAGNOSIS — E79.0 HYPERURICEMIA: ICD-10-CM

## 2024-07-03 DIAGNOSIS — R82.991 HYPOCITRATURIA: ICD-10-CM

## 2024-07-03 RX ORDER — ALLOPURINOL 100 MG/1
100 TABLET ORAL DAILY
Qty: 100 TABLET | Refills: 3 | OUTPATIENT
Start: 2024-07-03

## 2024-07-03 RX ORDER — POTASSIUM CITRATE 15 MEQ/1
1 TABLET, EXTENDED RELEASE ORAL 2 TIMES DAILY WITH MEALS
Qty: 180 TABLET | Refills: 1 | Status: SHIPPED | OUTPATIENT
Start: 2024-07-03

## 2024-07-03 NOTE — TELEPHONE ENCOUNTER
Medication:potassium citrate ER     Dose/Frequency: 1620MG take 1 capsule  by mouth 2 times da day with meals    Quantity: 180      Medication:Allopurinol    Dose/Frequency: 100mg- take one tab by mouth daily    Quantity: 100    Pharm[facility]: [facility] [place] [facility]           Office:   [] PCP/Provider -   [x] Speciality/Provider - Pita Bear     Does the patient have enough for 3 days?   [x] Yes   [] No - Send as HP to POD

## 2024-12-09 ENCOUNTER — TELEPHONE (OUTPATIENT)
Age: 54
End: 2024-12-09

## 2024-12-09 NOTE — TELEPHONE ENCOUNTER
Patient called in to advise that [employer] does not cover the litholink test. Patient is asking if we can change it to 24 hour urine and he will do through [employer] labs.

## 2024-12-11 ENCOUNTER — HOSPITAL ENCOUNTER (OUTPATIENT)
Dept: RADIOLOGY | Age: 54
Discharge: HOME | End: 2024-12-11
Payer: COMMERCIAL

## 2024-12-11 DIAGNOSIS — E04.2 MULTINODULAR GOITER: ICD-10-CM

## 2024-12-11 PROCEDURE — 76536 US EXAM OF HEAD AND NECK: CPT

## 2024-12-17 ENCOUNTER — RESULTS FOLLOW-UP (OUTPATIENT)
Dept: INTERNAL MEDICINE CLINIC | Facility: OTHER | Age: 54
End: 2024-12-17

## 2024-12-17 DIAGNOSIS — E04.2 MULTINODULAR GOITER: Primary | ICD-10-CM

## 2024-12-17 DIAGNOSIS — E04.1 THYROID NODULE: ICD-10-CM

## 2024-12-19 DIAGNOSIS — N20.0 BILATERAL NEPHROLITHIASIS: Primary | ICD-10-CM

## 2024-12-19 NOTE — TELEPHONE ENCOUNTER
Patient states he needs the lab listed for 24 hr urine test in his MyChart so that he will be able to get it done through [facility].    I am not able to see an active lab for this.    Please advise.

## 2024-12-23 NOTE — NURSING NOTE
Unable to reach patient, sent instructions and directions in e-mail that is linked to this account and via epic my chart. See message below.  Upcoming Radiology appointment at [facility]  Good afternoon Mr. Diaz,                     You have an upcoming radiology appointment at [facility]. We are located at [address] [place] [address]. Your appointment is scheduled for 1/2/25  @ 11 am you are scheduled to have a thyroid biopsy. You will need to enter Building [address], come up to the 1st floor and locate the Radiology department. Registration is in the Radiology department, please arrive 15 minutes prior so you may go through the registration process.                      For this procedure you may eat, drink, and take all your usual medications, including if you take aspirin.  If you are not taking any anti-anxiety medications for this procedure you may drive yourself to the appointment, but if you are taking anti- anxiety medication (Xanax, Ativan, etc.) for the procedure you will need a  for your safety to and from the appointment.                     We will be scanning your neck to locate the nodule with ultrasound and then cleaning your skin with a aseptic cleanser. You will be receiving an injection of Lidocaine to the area (local anesthetic) to numb the area, we will then proceed to take samples with the tip of our needle (so no incisions) of the nodule and send it to the lab for testing. If you have multiple nodules that are to be biopsied this process will be repeated for each nodule. Post procedure you will receive a Band-Aid and post procedure instructions. Your result will take 3 - 5 business days to show up to your ordering MD and also to [employer] humberto if you have it set up.      If you have any questions or concerns regarding the above information,  please feel free to send me a response via a call, email or [place].      If we have not  spoken yet and understand the above information and have no further questions or concerns can you please send me a response via a call, email or [facility] my chart that you have received and understood the information.     May you have a good day,   Na Mallory Eulogio RN  [facility] RN  [address] [place] [address]  [phone] (Office)  [phone] (Fax)  [email or web address]

## 2024-12-30 ENCOUNTER — APPOINTMENT (OUTPATIENT)
Dept: LAB | Age: 54
End: 2024-12-30
Payer: COMMERCIAL

## 2024-12-30 DIAGNOSIS — R82.991 HYPOCITRATURIA: ICD-10-CM

## 2024-12-30 DIAGNOSIS — N18.2 CKD (CHRONIC KIDNEY DISEASE) STAGE 2, GFR 60-89 ML/MIN: ICD-10-CM

## 2024-12-30 DIAGNOSIS — K76.0 NAFL (NONALCOHOLIC FATTY LIVER): ICD-10-CM

## 2024-12-30 DIAGNOSIS — E66.01 MORBID OBESITY (HCC): ICD-10-CM

## 2024-12-30 DIAGNOSIS — E04.2 MULTINODULAR GOITER: ICD-10-CM

## 2024-12-30 DIAGNOSIS — I10 ESSENTIAL HYPERTENSION: ICD-10-CM

## 2024-12-30 DIAGNOSIS — N20.0 BILATERAL NEPHROLITHIASIS: ICD-10-CM

## 2024-12-30 DIAGNOSIS — R73.01 IMPAIRED FASTING BLOOD SUGAR: ICD-10-CM

## 2024-12-30 DIAGNOSIS — R82.991 HYPOCITRATURIA: Primary | ICD-10-CM

## 2024-12-30 LAB
25(OH)D3 SERPL-MCNC: 28.6 NG/ML (ref 30–100)
ALBUMIN SERPL BCG-MCNC: 4.2 G/DL (ref 3.5–5)
ALP SERPL-CCNC: 70 U/L (ref 34–104)
ALT SERPL W P-5'-P-CCNC: 72 U/L (ref 7–52)
ANION GAP SERPL CALCULATED.3IONS-SCNC: 9 MMOL/L (ref 4–13)
AST SERPL W P-5'-P-CCNC: 36 U/L (ref 13–39)
BILIRUB SERPL-MCNC: 0.85 MG/DL (ref 0.2–1)
BUN SERPL-MCNC: 20 MG/DL (ref 5–25)
CALCIUM SERPL-MCNC: 9.8 MG/DL (ref 8.4–10.2)
CHLORIDE SERPL-SCNC: 100 MMOL/L (ref 96–108)
CO2 SERPL-SCNC: 30 MMOL/L (ref 21–32)
CREAT SERPL-MCNC: 0.92 MG/DL (ref 0.6–1.3)
EST. AVERAGE GLUCOSE BLD GHB EST-MCNC: 134 MG/DL
GFR SERPL CREATININE-BSD FRML MDRD: 93 ML/MIN/1.73SQ M
GLUCOSE P FAST SERPL-MCNC: 122 MG/DL (ref 65–99)
HBA1C MFR BLD: 6.3 %
PHOSPHATE SERPL-MCNC: 3 MG/DL (ref 2.7–4.5)
POTASSIUM SERPL-SCNC: 4.3 MMOL/L (ref 3.5–5.3)
PROT SERPL-MCNC: 6.8 G/DL (ref 6.4–8.4)
SODIUM SERPL-SCNC: 139 MMOL/L (ref 135–147)
TSH SERPL DL<=0.05 MIU/L-ACNC: 1.61 UIU/ML (ref 0.45–4.5)

## 2024-12-30 PROCEDURE — 84100 ASSAY OF PHOSPHORUS: CPT

## 2024-12-30 PROCEDURE — 84443 ASSAY THYROID STIM HORMONE: CPT

## 2024-12-30 PROCEDURE — 82306 VITAMIN D 25 HYDROXY: CPT

## 2024-12-30 PROCEDURE — 83036 HEMOGLOBIN GLYCOSYLATED A1C: CPT

## 2024-12-30 PROCEDURE — 80053 COMPREHEN METABOLIC PANEL: CPT

## 2024-12-30 PROCEDURE — 36415 COLL VENOUS BLD VENIPUNCTURE: CPT

## 2024-12-31 ENCOUNTER — RESULTS FOLLOW-UP (OUTPATIENT)
Dept: OTHER | Facility: HOSPITAL | Age: 54
End: 2024-12-31

## 2025-01-02 ENCOUNTER — HOSPITAL ENCOUNTER (OUTPATIENT)
Dept: RADIOLOGY | Facility: HOSPITAL | Age: 55
Discharge: HOME | End: 2025-01-02
Attending: INTERNAL MEDICINE
Payer: COMMERCIAL

## 2025-01-02 DIAGNOSIS — E04.1 THYROID NODULE: ICD-10-CM

## 2025-01-02 PROCEDURE — 88173 CYTOPATH EVAL FNA REPORT: CPT | Performed by: PATHOLOGY

## 2025-01-02 PROCEDURE — 10005 FNA BX W/US GDN 1ST LES: CPT

## 2025-01-02 RX ORDER — LIDOCAINE HYDROCHLORIDE 10 MG/ML
3 INJECTION, SOLUTION EPIDURAL; INFILTRATION; INTRACAUDAL; PERINEURAL ONCE
Status: COMPLETED | OUTPATIENT
Start: 2025-01-02 | End: 2025-01-02

## 2025-01-02 RX ADMIN — LIDOCAINE HYDROCHLORIDE 3 ML: 10 INJECTION, SOLUTION EPIDURAL; INFILTRATION; INTRACAUDAL; PERINEURAL at 11:22

## 2025-01-06 PROCEDURE — 88173 CYTOPATH EVAL FNA REPORT: CPT | Performed by: PATHOLOGY

## 2025-01-07 ENCOUNTER — OFFICE VISIT (OUTPATIENT)
Dept: INTERNAL MEDICINE CLINIC | Facility: OTHER | Age: 55
End: 2025-01-07
Payer: COMMERCIAL

## 2025-01-07 ENCOUNTER — APPOINTMENT (OUTPATIENT)
Dept: LAB | Facility: CLINIC | Age: 55
End: 2025-01-07
Payer: COMMERCIAL

## 2025-01-07 VITALS
HEART RATE: 81 BPM | SYSTOLIC BLOOD PRESSURE: 124 MMHG | HEIGHT: 70 IN | DIASTOLIC BLOOD PRESSURE: 68 MMHG | WEIGHT: 294.6 LBS | TEMPERATURE: 98.3 F | BODY MASS INDEX: 42.18 KG/M2 | OXYGEN SATURATION: 98 %

## 2025-01-07 DIAGNOSIS — N20.0 BILATERAL NEPHROLITHIASIS: ICD-10-CM

## 2025-01-07 DIAGNOSIS — Z12.5 PROSTATE CANCER SCREENING: ICD-10-CM

## 2025-01-07 DIAGNOSIS — B35.1 ONYCHOMYCOSIS: ICD-10-CM

## 2025-01-07 DIAGNOSIS — I10 ESSENTIAL HYPERTENSION: Primary | ICD-10-CM

## 2025-01-07 DIAGNOSIS — K21.9 LARYNGOPHARYNGEAL REFLUX (LPR): ICD-10-CM

## 2025-01-07 DIAGNOSIS — I47.10 PAROXYSMAL SVT (SUPRAVENTRICULAR TACHYCARDIA) (HCC): ICD-10-CM

## 2025-01-07 DIAGNOSIS — G47.33 OBSTRUCTIVE SLEEP APNEA: ICD-10-CM

## 2025-01-07 DIAGNOSIS — E79.0 HYPERURICEMIA: ICD-10-CM

## 2025-01-07 DIAGNOSIS — N18.2 CKD (CHRONIC KIDNEY DISEASE) STAGE 2, GFR 60-89 ML/MIN: ICD-10-CM

## 2025-01-07 DIAGNOSIS — Z00.00 ANNUAL PHYSICAL EXAM: ICD-10-CM

## 2025-01-07 DIAGNOSIS — R82.991 HYPOCITRATURIA: ICD-10-CM

## 2025-01-07 DIAGNOSIS — K76.0 NAFL (NONALCOHOLIC FATTY LIVER): ICD-10-CM

## 2025-01-07 PROCEDURE — 84133 ASSAY OF URINE POTASSIUM: CPT

## 2025-01-07 PROCEDURE — 83735 ASSAY OF MAGNESIUM: CPT

## 2025-01-07 PROCEDURE — 99396 PREV VISIT EST AGE 40-64: CPT | Performed by: INTERNAL MEDICINE

## 2025-01-07 PROCEDURE — 84392 ASSAY OF URINE SULFATE: CPT

## 2025-01-07 PROCEDURE — 99214 OFFICE O/P EST MOD 30 MIN: CPT | Performed by: INTERNAL MEDICINE

## 2025-01-07 PROCEDURE — 82131 AMINO ACIDS SINGLE QUANT: CPT

## 2025-01-07 PROCEDURE — 83945 ASSAY OF OXALATE: CPT

## 2025-01-07 PROCEDURE — 82507 ASSAY OF CITRATE: CPT

## 2025-01-07 PROCEDURE — 82436 ASSAY OF URINE CHLORIDE: CPT

## 2025-01-07 PROCEDURE — 84300 ASSAY OF URINE SODIUM: CPT

## 2025-01-07 PROCEDURE — 82140 ASSAY OF AMMONIA: CPT

## 2025-01-07 PROCEDURE — 83935 ASSAY OF URINE OSMOLALITY: CPT

## 2025-01-07 PROCEDURE — 84105 ASSAY OF URINE PHOSPHORUS: CPT

## 2025-01-07 PROCEDURE — 81003 URINALYSIS AUTO W/O SCOPE: CPT

## 2025-01-07 PROCEDURE — 82570 ASSAY OF URINE CREATININE: CPT

## 2025-01-07 PROCEDURE — 82340 ASSAY OF CALCIUM IN URINE: CPT

## 2025-01-07 PROCEDURE — 84560 ASSAY OF URINE/URIC ACID: CPT

## 2025-01-07 RX ORDER — DILTIAZEM HYDROCHLORIDE 180 MG/1
180 CAPSULE, COATED, EXTENDED RELEASE ORAL DAILY
Qty: 100 CAPSULE | Refills: 3 | Status: SHIPPED | OUTPATIENT
Start: 2025-01-07 | End: 2025-01-07

## 2025-01-07 RX ORDER — LANSOPRAZOLE 30 MG/1
30 CAPSULE, DELAYED RELEASE ORAL AS NEEDED
Qty: 90 CAPSULE | Refills: 3 | Status: SHIPPED | OUTPATIENT
Start: 2025-01-07

## 2025-01-07 RX ORDER — TERBINAFINE HYDROCHLORIDE 250 MG/1
250 TABLET ORAL DAILY
Qty: 42 TABLET | Refills: 1 | Status: SHIPPED | OUTPATIENT
Start: 2025-01-07 | End: 2025-01-07

## 2025-01-07 RX ORDER — ALLOPURINOL 100 MG/1
100 TABLET ORAL DAILY
Qty: 100 TABLET | Refills: 3 | Status: SHIPPED | OUTPATIENT
Start: 2025-01-07

## 2025-01-07 RX ORDER — LISINOPRIL AND HYDROCHLOROTHIAZIDE 20; 25 MG/1; MG/1
1 TABLET ORAL DAILY
Qty: 100 TABLET | Refills: 3 | Status: SHIPPED | OUTPATIENT
Start: 2025-01-07 | End: 2025-01-07

## 2025-01-07 RX ORDER — LANSOPRAZOLE 30 MG/1
30 CAPSULE, DELAYED RELEASE ORAL AS NEEDED
Qty: 90 CAPSULE | Refills: 3 | Status: SHIPPED | OUTPATIENT
Start: 2025-01-07 | End: 2025-01-07

## 2025-01-07 RX ORDER — DILTIAZEM HYDROCHLORIDE 180 MG/1
180 CAPSULE, COATED, EXTENDED RELEASE ORAL DAILY
Qty: 100 CAPSULE | Refills: 3 | Status: SHIPPED | OUTPATIENT
Start: 2025-01-07

## 2025-01-07 RX ORDER — LISINOPRIL AND HYDROCHLOROTHIAZIDE 20; 25 MG/1; MG/1
1 TABLET ORAL DAILY
Qty: 100 TABLET | Refills: 3 | Status: SHIPPED | OUTPATIENT
Start: 2025-01-07

## 2025-01-07 RX ORDER — TERBINAFINE HYDROCHLORIDE 250 MG/1
250 TABLET ORAL DAILY
Qty: 42 TABLET | Refills: 1 | Status: SHIPPED | OUTPATIENT
Start: 2025-01-07 | End: 2025-04-01

## 2025-01-07 RX ORDER — ALLOPURINOL 100 MG/1
100 TABLET ORAL DAILY
Qty: 100 TABLET | Refills: 3 | Status: SHIPPED | OUTPATIENT
Start: 2025-01-07 | End: 2025-01-07

## 2025-01-07 NOTE — ASSESSMENT & PLAN NOTE
Stable, continue Cardizem .    Orders:  •  diltiazem (CARDIZEM CD) 180 mg 24 hr capsule; Take 1 capsule (180 mg total) by mouth daily  •  Comprehensive metabolic panel; Future  •  CBC; Future  •  Hemoglobin A1C; Future  •  TSH, 3rd generation with Free T4 reflex; Future

## 2025-01-07 NOTE — ASSESSMENT & PLAN NOTE
Lab Results   Component Value Date    EGFR 93 12/30/2024    EGFR 92 06/14/2024    EGFR 81 01/05/2024    CREATININE 0.92 12/30/2024    CREATININE 0.93 06/14/2024    CREATININE 1.04 01/05/2024   Continue follow-up with nephrology.  Continue to trend kidney function.

## 2025-01-07 NOTE — PROGRESS NOTES
Adult Annual Physical  Name: Valentin Diaz      : 1970      MRN: [id number]  Encounter Provider: Edagr Pandya MD  Encounter Date: 2025   Encounter department: [facility]    Assessment & Plan  Essential hypertension    Orders:  •  lisinopril-hydrochlorothiazide (PRINZIDE,ZESTORETIC) 20-25 MG per tablet; Take 1 tablet by mouth daily  •  Comprehensive metabolic panel; Future  •  CBC; Future  •  Hemoglobin A1C; Future  •  Lipid panel; Future  •  TSH, 3rd generation with Free T4 reflex; Future    Paroxysmal SVT (supraventricular tachycardia) (HCC)    Orders:  •  diltiazem (CARDIZEM CD) 180 mg 24 hr capsule; Take 1 capsule (180 mg total) by mouth daily  •  Comprehensive metabolic panel; Future  •  CBC; Future  •  Hemoglobin A1C; Future  •  TSH, 3rd generation with Free T4 reflex; Future    Obstructive sleep apnea         Laryngopharyngeal reflux (LPR)    Orders:  •  lansoprazole (PREVACID) 30 mg capsule; Take 1 capsule (30 mg total) by mouth as needed (LPR)    NAFL (nonalcoholic fatty liver)         CKD (chronic kidney disease) stage 2, GFR 60-89 ml/min  Lab Results   Component Value Date    EGFR 93 2024    EGFR 92 2024    EGFR 81 2024    CREATININE 0.92 2024    CREATININE 0.93 2024    CREATININE 1.04 2024          Annual physical exam         Onychomycosis  Will start terbinafine for 12 weeks.  Will check LFTs in 6 weeks.  Orders:  •  Hepatic function panel; Future  •  terbinafine (LamISIL) 250 mg tablet; Take 1 tablet (250 mg total) by mouth daily  •  Comprehensive metabolic panel; Future    Hyperuricemia    Orders:  •  allopurinol (ZYLOPRIM) 100 mg tablet; Take 1 tablet (100 mg total) by mouth daily    Prostate cancer screening    Orders:  •  PSA, Total Screen; Future    Immunizations and preventive care screenings were discussed with patient today. Appropriate education was printed on patient's after visit  summary.    Discussed risks and benefits of prostate cancer screening. We discussed the controversial history of PSA screening for prostate cancer in the [place] as well as the risk of over detection and over treatment of prostate cancer by way of PSA screening.  The patient understands that PSA blood testing is an imperfect way to screen for prostate cancer and that elevated PSA levels in the blood may also be caused by infection, inflammation, prostatic trauma or manipulation, urological procedures, or by benign prostatic enlargement.    The role of the digital rectal examination in prostate cancer screening was also discussed and I discussed with him that there is large interobserver variability in the findings of digital rectal examination.    Counseling:  Alcohol/drug use: discussed moderation in alcohol intake, the recommendations for healthy alcohol use, and avoidance of illicit drug use.  Dental Health: discussed importance of regular tooth brushing, flossing, and dental visits.  Injury prevention: discussed safety/seat belts, safety helmets, smoke detectors, carbon monoxide detectors, and smoking near bedding or upholstery.  Sexual health: discussed sexually transmitted diseases, partner selection, use of condoms, avoidance of unintended pregnancy, and contraceptive alternatives.  Exercise: the importance of regular exercise/physical activity was discussed. Recommend exercise 3-5 times per week for at least 30 minutes.          History of Present Illness     Adult Annual Physical:  Patient presents for annual physical.     Diet and Physical Activity:  - Diet/Nutrition: well balanced diet.  - Exercise: 3-4 times a week on average.    General Health:  - Sleep: 7-8 hours of sleep on average and sleeps well.  - Hearing: normal hearing bilateral ears.  - Vision: no vision problems, goes for regular eye exams and wears glasses.  - Dental: regular dental visits and brushes teeth twice daily.     Health:  -  "History of STDs: no.   - Urinary symptoms: none.     Review of Systems   Constitutional:  Negative for activity change, appetite change, chills, diaphoresis, fatigue and fever.   HENT:  Negative for congestion, postnasal drip, rhinorrhea, sinus pressure, sinus pain, sneezing and sore throat.    Eyes:  Negative for visual disturbance.   Respiratory:  Negative for apnea, cough, choking, chest tightness, shortness of breath and wheezing.    Cardiovascular:  Negative for chest pain, palpitations and leg swelling.   Gastrointestinal:  Negative for abdominal distention, abdominal pain, anal bleeding, blood in stool, constipation, diarrhea, nausea and vomiting.   Endocrine: Negative for cold intolerance and heat intolerance.   Genitourinary:  Negative for difficulty urinating, dysuria and hematuria.   Musculoskeletal: Negative.    Skin: Negative.    Neurological:  Negative for dizziness, weakness, light-headedness, numbness and headaches.   Hematological:  Negative for adenopathy.   Psychiatric/Behavioral:  Negative for agitation, sleep disturbance and suicidal ideas.    All other systems reviewed and are negative.        Objective   /68 (BP Location: Left arm, Patient Position: Sitting, Cuff Size: Large)   Pulse 81   Temp 98.3 °F (36.8 °C)   Ht 5' 10\" (1.778 m)   Wt 134 kg (294 lb 9.6 oz)   SpO2 98%   BMI 42.27 kg/m²     Physical Exam  Vitals and nursing note reviewed.   Constitutional:       General: He is not in acute distress.     Appearance: He is well-developed. He is not diaphoretic.   HENT:      Head: Normocephalic and atraumatic.   Eyes:      General: No scleral icterus.        Right eye: No discharge.         Left eye: No discharge.      Conjunctiva/sclera: Conjunctivae normal.      Pupils: Pupils are equal, round, and reactive to light.   Neck:      Thyroid: No thyromegaly.      Vascular: No JVD.   Cardiovascular:      Rate and Rhythm: Normal rate and regular rhythm.      Heart sounds: Normal heart " sounds. No murmur heard.     No friction rub. No gallop.   Pulmonary:      Effort: Pulmonary effort is normal. No respiratory distress.      Breath sounds: Normal breath sounds. No wheezing or rales.   Chest:      Chest wall: No tenderness.   Abdominal:      General: Bowel sounds are normal. There is no distension.      Palpations: Abdomen is soft. There is no mass.      Tenderness: There is no abdominal tenderness. There is no guarding or rebound.   Musculoskeletal:         General: No tenderness or deformity. Normal range of motion.      Cervical back: Normal range of motion and neck supple.   Lymphadenopathy:      Cervical: No cervical adenopathy.   Skin:     General: Skin is warm and dry.      Coloration: Skin is not pale.      Findings: No erythema or rash.   Neurological:      Mental Status: He is alert and oriented to person, place, and time.      Cranial Nerves: No cranial nerve deficit.      Coordination: Coordination normal.      Deep Tendon Reflexes: Reflexes are normal and symmetric.   Psychiatric:         Behavior: Behavior normal.         Thought Content: Thought content normal.         Judgment: Judgment normal.

## 2025-01-07 NOTE — ASSESSMENT & PLAN NOTE
Continue Prevacid.  Orders:  •  lansoprazole (PREVACID) 30 mg capsule; Take 1 capsule (30 mg total) by mouth as needed (LPR)

## 2025-01-07 NOTE — ASSESSMENT & PLAN NOTE
Orders:  •  lisinopril-hydrochlorothiazide (PRINZIDE,ZESTORETIC) 20-25 MG per tablet; Take 1 tablet by mouth daily  •  Comprehensive metabolic panel; Future  •  CBC; Future  •  Hemoglobin A1C; Future  •  Lipid panel; Future  •  TSH, 3rd generation with Free T4 reflex; Future

## 2025-01-07 NOTE — ASSESSMENT & PLAN NOTE
Discussed diet and exercise per  He is up-to-date on immunizations.  He is up-to-date on colorectal cancer screening.  He is up-to-date on prostate cancer screening.

## 2025-01-07 NOTE — ASSESSMENT & PLAN NOTE
Lab Results   Component Value Date    EGFR 93 12/30/2024    EGFR 92 06/14/2024    EGFR 81 01/05/2024    CREATININE 0.92 12/30/2024    CREATININE 0.93 06/14/2024    CREATININE 1.04 01/05/2024

## 2025-01-07 NOTE — ASSESSMENT & PLAN NOTE
Orders:  •  lansoprazole (PREVACID) 30 mg capsule; Take 1 capsule (30 mg total) by mouth as needed (LPR)

## 2025-01-07 NOTE — PROGRESS NOTES
Name: Valentin Diaz      : 1970      MRN: [id number]  Encounter Provider: Edgar Pandya MD  Encounter Date: 2025   Encounter department: [facility] PRIMARY [facility]  :  Assessment & Plan  Essential hypertension  Controlled.  Continue current antihypertensive regimen.  Continue to trend kidney function.  Orders:  •  lisinopril-hydrochlorothiazide (PRINZIDE,ZESTORETIC) 20-25 MG per tablet; Take 1 tablet by mouth daily  •  Comprehensive metabolic panel; Future  •  CBC; Future  •  Hemoglobin A1C; Future  •  Lipid panel; Future  •  TSH, 3rd generation with Free T4 reflex; Future    Paroxysmal SVT (supraventricular tachycardia) (HCC)  Stable, continue Cardizem .    Orders:  •  diltiazem (CARDIZEM CD) 180 mg 24 hr capsule; Take 1 capsule (180 mg total) by mouth daily  •  Comprehensive metabolic panel; Future  •  CBC; Future  •  Hemoglobin A1C; Future  •  TSH, 3rd generation with Free T4 reflex; Future    Obstructive sleep apnea  Continue with nightly CPAP, currently at 10-15 cm H2O pressure.        Laryngopharyngeal reflux (LPR)  Continue Prevacid.  Orders:  •  lansoprazole (PREVACID) 30 mg capsule; Take 1 capsule (30 mg total) by mouth as needed (LPR)    NAFL (nonalcoholic fatty liver)  Discussed diet and exercise.  Continue to trend LFTs.       CKD (chronic kidney disease) stage 2, GFR 60-89 ml/min  Lab Results   Component Value Date    EGFR 93 2024    EGFR 92 2024    EGFR 81 2024    CREATININE 0.92 2024    CREATININE 0.93 2024    CREATININE 1.04 2024   Continue follow-up with nephrology.  Continue to trend kidney function.       Annual physical exam  Discussed diet and exercise per  He is up-to-date on immunizations.  He is up-to-date on colorectal cancer screening.  He is up-to-date on prostate cancer screening.       Onychomycosis  Will start terbinafine for 12 weeks.  Will check LFTs in 6 weeks.  Orders:  •  Hepatic function panel; Future  •   terbinafine (LamISIL) 250 mg tablet; Take 1 tablet (250 mg total) by mouth daily  •  Comprehensive metabolic panel; Future    Hyperuricemia    Orders:  •  allopurinol (ZYLOPRIM) 100 mg tablet; Take 1 tablet (100 mg total) by mouth daily    Prostate cancer screening    Orders:  •  PSA, Total Screen; Future           History of Present Illness     Valentin Diaz is seen today for follow up of chronic conditions.   Recent laboratory studies reviewed today with the patient.   he has been compliant with his medication regimen.   He has onychomycosis of the right great toe.  He denies any recent injury.  He underwent ultrasound-guided thyroid biopsy, findings consistent with benign follicular cells.  He has no complaints or concerns at this time.       Hypertension  This is a chronic problem. The current episode started more than 1 year ago. The problem is unchanged. The problem is controlled. Pertinent negatives include no blurred vision, chest pain, malaise/fatigue, orthopnea, palpitations, PND or shortness of breath. Past treatments include ACE inhibitors, diuretics and calcium channel blockers. The current treatment provides moderate improvement. There are no compliance problems.      Review of Systems   Constitutional: Negative.  Negative for chills, fatigue, fever and malaise/fatigue.   HENT:  Negative for congestion, ear pain, postnasal drip, rhinorrhea and sore throat.    Eyes: Negative.  Negative for blurred vision.   Respiratory:  Negative for cough, chest tightness, shortness of breath and wheezing.    Cardiovascular:  Negative for chest pain, palpitations, orthopnea and PND.   Gastrointestinal:  Negative for abdominal distention, abdominal pain, blood in stool, constipation, diarrhea and nausea.   Endocrine: Negative.    Genitourinary:  Negative for difficulty urinating, dysuria and hematuria.   Musculoskeletal: Negative.    Skin: Negative.    Allergic/Immunologic: Negative for environmental allergies and  "food allergies.   Neurological: Negative.    Hematological:  Negative for adenopathy.   Psychiatric/Behavioral:  Negative for agitation, behavioral problems, confusion and sleep disturbance.    All other systems reviewed and are negative.      Objective   /68 (BP Location: Left arm, Patient Position: Sitting, Cuff Size: Large)   Pulse 81   Temp 98.3 °F (36.8 °C)   Ht 5' 10\" (1.778 m)   Wt 134 kg (294 lb 9.6 oz)   SpO2 98%   BMI 42.27 kg/m²      Physical Exam  Vitals and nursing note reviewed.   Constitutional:       General: He is not in acute distress.     Appearance: He is well-developed. He is not diaphoretic.   HENT:      Head: Normocephalic and atraumatic.   Eyes:      General: No scleral icterus.        Right eye: No discharge.         Left eye: No discharge.      Conjunctiva/sclera: Conjunctivae normal.      Pupils: Pupils are equal, round, and reactive to light.   Neck:      Thyroid: No thyromegaly.      Vascular: No JVD.   Cardiovascular:      Rate and Rhythm: Normal rate and regular rhythm.      Heart sounds: Normal heart sounds. No murmur heard.     No friction rub. No gallop.   Pulmonary:      Effort: Pulmonary effort is normal. No respiratory distress.      Breath sounds: Normal breath sounds. No wheezing or rales.   Chest:      Chest wall: No tenderness.   Abdominal:      General: Bowel sounds are normal. There is no distension.      Palpations: Abdomen is soft. There is no mass.      Tenderness: There is no abdominal tenderness. There is no guarding or rebound.   Musculoskeletal:         General: No tenderness or deformity. Normal range of motion.      Cervical back: Normal range of motion and neck supple.        Feet:    Lymphadenopathy:      Cervical: No cervical adenopathy.   Skin:     General: Skin is warm and dry.      Coloration: Skin is not pale.      Findings: No erythema or rash.   Neurological:      Mental Status: He is alert and oriented to person, place, and time.      Cranial " Nerves: No cranial nerve deficit.      Coordination: Coordination normal.      Deep Tendon Reflexes: Reflexes are normal and symmetric.   Psychiatric:         Behavior: Behavior normal.         Thought Content: Thought content normal.         Judgment: Judgment normal.

## 2025-01-07 NOTE — ASSESSMENT & PLAN NOTE
Controlled.  Continue current antihypertensive regimen.  Continue to trend kidney function.  Orders:  •  lisinopril-hydrochlorothiazide (PRINZIDE,ZESTORETIC) 20-25 MG per tablet; Take 1 tablet by mouth daily  •  Comprehensive metabolic panel; Future  •  CBC; Future  •  Hemoglobin A1C; Future  •  Lipid panel; Future  •  TSH, 3rd generation with Free T4 reflex; Future

## 2025-01-07 NOTE — ASSESSMENT & PLAN NOTE
Will start terbinafine for 12 weeks.  Will check LFTs in 6 weeks.  Orders:  •  Hepatic function panel; Future  •  terbinafine (LamISIL) 250 mg tablet; Take 1 tablet (250 mg total) by mouth daily  •  Comprehensive metabolic panel; Future

## 2025-01-09 LAB — COMMENT: NORMAL

## 2025-01-14 ENCOUNTER — RESULTS FOLLOW-UP (OUTPATIENT)
Dept: NEPHROLOGY | Facility: CLINIC | Age: 55
End: 2025-01-14

## 2025-01-14 DIAGNOSIS — R82.991 HYPOCITRATURIA: Primary | ICD-10-CM

## 2025-01-16 ENCOUNTER — APPOINTMENT (OUTPATIENT)
Dept: LAB | Facility: CLINIC | Age: 55
End: 2025-01-16
Payer: COMMERCIAL

## 2025-01-16 DIAGNOSIS — B35.1 ONYCHOMYCOSIS: ICD-10-CM

## 2025-01-16 DIAGNOSIS — I47.10 PAROXYSMAL SVT (SUPRAVENTRICULAR TACHYCARDIA) (HCC): ICD-10-CM

## 2025-01-16 DIAGNOSIS — I10 ESSENTIAL HYPERTENSION: ICD-10-CM

## 2025-01-16 DIAGNOSIS — R82.991 HYPOCITRATURIA: ICD-10-CM

## 2025-01-16 PROCEDURE — 84300 ASSAY OF URINE SODIUM: CPT

## 2025-01-16 PROCEDURE — 82570 ASSAY OF URINE CREATININE: CPT

## 2025-01-16 PROCEDURE — 82507 ASSAY OF CITRATE: CPT

## 2025-01-16 PROCEDURE — 82131 AMINO ACIDS SINGLE QUANT: CPT

## 2025-01-16 PROCEDURE — 84560 ASSAY OF URINE/URIC ACID: CPT

## 2025-01-16 PROCEDURE — 83945 ASSAY OF OXALATE: CPT

## 2025-01-16 PROCEDURE — 84392 ASSAY OF URINE SULFATE: CPT

## 2025-01-16 PROCEDURE — 82340 ASSAY OF CALCIUM IN URINE: CPT

## 2025-01-16 PROCEDURE — 83735 ASSAY OF MAGNESIUM: CPT

## 2025-01-16 PROCEDURE — 82436 ASSAY OF URINE CHLORIDE: CPT

## 2025-01-16 PROCEDURE — 84133 ASSAY OF URINE POTASSIUM: CPT

## 2025-01-16 PROCEDURE — 81003 URINALYSIS AUTO W/O SCOPE: CPT

## 2025-01-16 PROCEDURE — 83935 ASSAY OF URINE OSMOLALITY: CPT

## 2025-01-16 PROCEDURE — 82140 ASSAY OF AMMONIA: CPT

## 2025-01-16 PROCEDURE — 84105 ASSAY OF URINE PHOSPHORUS: CPT

## 2025-01-17 DIAGNOSIS — Z12.11 SCREENING FOR COLON CANCER: Primary | ICD-10-CM

## 2025-01-22 ENCOUNTER — RESULTS FOLLOW-UP (OUTPATIENT)
Dept: OTHER | Facility: HOSPITAL | Age: 55
End: 2025-01-22

## 2025-01-22 LAB
AMMONIA UR-SCNC: 42 MMOL/24 HR (ref 15–60)
CA H2 PHOS DIHYD 24H SATFR UR: 0.57 (ref 0.5–2)
CALCIUM 24H UR-MRATE: 161 MG/24 HR
CALCIUM/CREAT UR: 70 MG/G CREAT (ref 34–196)
CALCIUM/KG BODY WEIGHT: 1.2 MG/24 HR/KG
CAOX INDEX 24H UR-RTO: 1.99 (ref 6–10)
CHLORIDE 24H UR-SRATE: 257 MMOL/24 HR (ref 70–250)
CITRATE 24H UR-MCNC: 1118 MG/24 HR
COMMENT: ABNORMAL
CREAT UR-MCNC: 2302 MG/24 HR
CREAT/BW 24H UR-RELMRAT: 17.5 MG/24 HR/KG (ref 11.9–24.4)
CYSTINE 24H UR-MCNC: ABNORMAL MG/DL
MAGNESIUM 24H UR-MRATE: 135 MG/24 HR (ref 30–120)
OXALATE UR-MCNC: 48 MG/24 HR (ref 20–40)
PH 24H UR: 6.32 [PH] (ref 5.8–6.2)
PHOSPHATE 24H UR-MRATE: 1952 MG/24 HR (ref 600–1200)
POTASSIUM 24H UR-SCNC: 135 MMOL/24 HR (ref 20–100)
PROTEIN CATABOLIC RATE 24H UR-MRATE: 1 G/KG/24 HR (ref 0.8–1.4)
SODIUM 24H UR-SRATE: 258 MMOL/24 HR (ref 50–150)
SPECIMEN VOL 24H UR: 4240 ML/24 HR (ref 500–4000)
SULFATE 24H UR-SRATE: 56 MEQ/24 HR (ref 20–80)
URATE 24H SATFR UR: 0.22
URATE 24H UR-MRATE: 872 MG/24 HR
UUN 24H UR-MRATE: 16.74 G/24 HR (ref 6–14)

## 2025-02-06 ENCOUNTER — OFFICE VISIT (OUTPATIENT)
Dept: NEPHROLOGY | Facility: CLINIC | Age: 55
End: 2025-02-06
Payer: COMMERCIAL

## 2025-02-06 VITALS
DIASTOLIC BLOOD PRESSURE: 78 MMHG | SYSTOLIC BLOOD PRESSURE: 134 MMHG | WEIGHT: 299 LBS | BODY MASS INDEX: 42.8 KG/M2 | HEIGHT: 70 IN

## 2025-02-06 DIAGNOSIS — N20.0 NEPHROLITHIASIS: Primary | ICD-10-CM

## 2025-02-06 DIAGNOSIS — R82.991 HYPOCITRATURIA: ICD-10-CM

## 2025-02-06 DIAGNOSIS — E79.0 HYPERURICEMIA: ICD-10-CM

## 2025-02-06 DIAGNOSIS — N18.2 CKD (CHRONIC KIDNEY DISEASE) STAGE 2, GFR 60-89 ML/MIN: ICD-10-CM

## 2025-02-06 DIAGNOSIS — E55.9 VITAMIN D DEFICIENCY: ICD-10-CM

## 2025-02-06 DIAGNOSIS — I10 ESSENTIAL HYPERTENSION: ICD-10-CM

## 2025-02-06 DIAGNOSIS — E66.01 MORBID OBESITY (HCC): ICD-10-CM

## 2025-02-06 PROCEDURE — 99213 OFFICE O/P EST LOW 20 MIN: CPT | Performed by: INTERNAL MEDICINE

## 2025-02-06 RX ORDER — POTASSIUM CITRATE 15 MEQ/1
1 TABLET, EXTENDED RELEASE ORAL 2 TIMES DAILY WITH MEALS
Qty: 180 TABLET | Refills: 3 | Status: SHIPPED | OUTPATIENT
Start: 2025-02-06

## 2025-02-06 RX ORDER — ALLOPURINOL 100 MG/1
150 TABLET ORAL DAILY
Qty: 145 TABLET | Refills: 4 | Status: SHIPPED | OUTPATIENT
Start: 2025-02-06

## 2025-02-06 NOTE — PATIENT INSTRUCTIONS
"Increase allopurinol to 150mg daily  Get 24 hr urine prior to visit  Get blood work in 6months    Please call me in 10 days after having your blood work done to review the results if you do not hear back from me or my office, as I may have not received the results.  please remember to perform blood work prior to the next visit.  Please call if the blood pressure top number is greater than 140 or less than 110 consistently.  Please call if you are gaining more than 2lbs in 2 days for adjustment of water pills.  ~ Please AVOID the following pain medications.  LIST OF NSAIDS (NONSTEROIDAL ANTI-INFLAMMATORY DRUGS) AND CARVALHO-2 INHIBITORS    DIFLUNISAL (DOLOBID)  IBUPROFEN (MOTRIN, ADVIL)  FLURBIPROFEN (ANSAID)  KETOPROFEN (ORUDIS, ORUVAIL)  FENOPROFEN (NALFON)  NABUMETONE (RELAFEN)  PIROXICAM (FELDENE)  NAPROXEN (ALEVE, NAPROSYN, NAPRELAN, ANAPROX)  DICLOFENAC (VOLTAREN, CATAFLAM)  INDOMETHACIN (INDOCIN)  SULINDAC (CLINORIL)  TOLMETIN (TOLETIN)  ETODOLAC (LODINE)  MELOXICAM (MOBIC)  KETOROLAC (TORADOL)  OXAPROZIN (DAYPRO)  CELECOXIB (CELEBREX)Phosphorus diet  Follow a low phosphorus diet.    Avoid these higher phosphorus foods: Choose these lower phosphorus foods:   Milk, pudding or yogurt (from animals and from many soy varieties) Rice milk (unfortified), nondairy creamer (if it doesn't have terms in the ingredients list that contain the letters \"phos\")   Hard cheeses, ricotta or cottage cheese, fat-free cream cheese Regular and low-fat cream cheese   Ice cream or frozen yogurt Sherbet or frozen fruit pops   Soups made with higher phosphorus ingredients (milk, dried peas, beans, lentils) Soups made with lower phosphorus ingredients (broth- or water-based with other lower phosphorus ingredients)   Whole grains, including whole-grain breads, crackers, cereal, rice and pasta Refined grains, including white bread, crackers, cereals, rice and pasta   Quick breads, biscuits, cornbread, muffins, pancakes or waffles Homemade " "refined (white) dinner rolls, bagels or English muffins   Dried peas (split, black-eyed), beans (black, garbanzo, lima, kidney, navy, moore) or lentils Green peas (canned, frozen), green beans or wax beans   Organ meats, walleye, pollock or sardines Lean beef, pork, lamb, poultry or other fish   Nuts and seeds Popcorn   Peanut butter and other nut butters Jam, jelly or honey   Chocolate, including chocolate drinks Carob (chocolate-flavored) candy, hard candy or gumdrops   Miguel Ángel and pepper-type sodas, flavored woods, bottled teas (if a term in the ingredients list contains the letters \"phos\") Lemon-lime soda, ginger ale or root beer, plain water   Kidney Stone Prevention    Fluid Intake    A high fluid intake is one of the most important cornerstones of kidney stone dietary prevention. A sufficiently dilute urine will prevent the individual chemical components of stones from becoming concentrated enough to precipitate out of solution, keeping them instead in their dissolved state. A high urine output also may reduce stone from forming through \"flushing\" out of stone components and prevention of urine stagnation. In addition to stone benefits, increased water intake has been shown to have a multitude of other benefits, including improved alertness, better skin appearance, enhanced physical performance, reduced constipation and enhanced weight loss.    The average daily urine output for normal healthy adults is 1.2 liters a day, ranging from 1 to 2 liters in most individuals and varying with body weight and gender. In stone formers, however, a higher daily urine output is required for stone prevention and achieving a daily volume of at least 2.0 to 2.5 liters a day can significantly reduce the recurrence of future stones. In a randomized study of stone formers who were given specific instructions to increase their fluid intake compared to stone formers told to not change their diet, those given specific fluid " instructions achieved a high urine output of 2.6 liters a day versus 1.0 liters a day in those not given dietary instructions. Over a period of five years, the high fluid intake group was half as likely to form new stones as compared to the normal fluid intake group (Dandy et al, J Urol 1996).    We recommend that most stone formers increase their daily fluid intake by one liter (an additional two 16 oz water bottles or two tall glasses a day) in order to achieve a urine output of 2.5 liters a day. (One liter = 4.2 8-oz glasses or 34 oz). Alternatively, a 24 hour urine collection can be performed to guide fluid intake to achieve 2.5 liters of urine output in a specific patient.    Type of Fluid Intake    The type of fluid intake is generally less important than the total intake. While drinking water is our preferred recommendation because it is inexpensive and contains no calories, for stone patients who do not enjoy drinking water, any beverage will be beneficial in reducing stone risk.    Contrary to popular belief, studies have found that an increased intake of tea, coffee, and alcoholic beverage actually reduces the risk of stones, possibly because of an associated increase in urine output (oDnta et al, Am J of Epidemiology, 1996). While tea contains high levels of oxalate, this does not appear to result in increased stone formation for the reasons discussed below in discussion on oxalate.    Soda intake (including theodora) and milk intake also do not appear to increase the risk of stones.    Citrus Fruit Juices    Citrus juices, including lemon juice and orange juice, contain citrate, which acts as a stone inhibitor for calcium based stones. Citrate seems to do this by binding calcium, making it unavailable to combine with oxalate or phosphate: a necessary first step in the formation of stones. Citrate also seems to make it more difficult for stones to grow once they've formed.    Drinking citrus juice in the  form of concentrated lemon juice mixed with water has been shown to effectively increase urinary citrate levels and reduce urinary calcium levels, both of which will reduce stone risk. Orange juice will similarly increase urinary citrate levels. However, orange juice appears to also increase urinary oxalate levels ( a stone promoter). Other sources of citrate, including grapefruit juice, have had less research completed confirming their beneficial effects on urinary citrate levels. Therefore, lemon juice is typically favored over the other citrus juices as a natural method to increase urinary citrate levels. Many patients find drinking citrus juices to be an attractive alternative to pharmaceutical treatment with potassium citrate.    We recommend that stone formers consider supplementing their daily fluid intake with a mixture of 60 ml of concentrated lemon juice in one liter of water to increase their urinary citrate levels.    Salt    A high sodium intake increases the risk of stone formation by increasing calcium levels and decreasing citrate (a stone inhibitor) levels in urine. Additionally, high sodium intake will impair the ability of medications such as hydrochlorothiazide to effectively reduce calcium levels in urine. A study of stone formers who were kept on a strict diet with a maximum daily sodium intake of 50 mmol (1200 mg) in addition to a reduced protein diet demonstrated that the low sodium diet was effective in reducing stone recurrence by 50% as compared to the low calcium diet.    We recommend that stone formers aim to follow the FDA's guideline of limiting salt intake to 2300 mg of sodium a day in the general population and 1500 mg of sodium a day in those with hypertension,  Americans, or middle aged and older adults. 2300 mg is equivalent to about 1 teaspoon of table salt.    The best way to determine the salt content of your food is to read the nutrition label. Processed foods tend to  contain higher amounts of salt. Choose low sodium options whenever possible.    1 cup of canned chicken noodle soup contains 870 mg of sodium.  A fried chicken drumstick contains 310 mg of sodium.  A serving of shrimp contains 240 mg of sodium.  2 slices of white bread contains 200 mg of sodium.  15 potato chips contain 180 mg of sodium.  1 container of strawberry yogurt contains 85 mg of sodium.  1 tomato contains 20 mg of sodium.  1 apple contains 0 mg of sodium.    In addition to lowering the risk of stones, a low sodium intake helps to control or prevent high blood pressure, which can lead to heart disease, stroke, heart failure, and kidney disease.    Animal Protein    Animal protein in meat products increases the risk of stone by increasing calcium, oxalate, and uric acid levels in urine. All three of these changes increase the risk of stones. In studies comparing high meat eaters versus low meat eaters, high meat eaters were found to be at increased risk of forming stones. A randomized study of stone formers restricted to a low meat intake of 52 grams a day (equivalent to 8 oz of beef) in combination with sodium restriction found that the combination reduced stone recurrence by 50% compared to calcium restriction alone (Dandy et al, [facility] 2002).    We recommend that most stone formers try to reduce their meat intake to 6 oz a day. This includes all types of meat: beef, pork, poultry, and seafood.    The USDA has recommended a daily allowance of 5-6 oz of protein intake among adults. They also recommend choosing non-meat protein foods such as nuts and beans instead of meat sources. Protein from non-meat sources does not appear to increase the risk of stones.    A small steak contains about 3-4 oz of protein.  A quarter pound hamburger with cheese contains 4 oz of protein.  A chicken breast contains about 5 oz of protein, a chicken thigh about 2.5 oz, a chicken drumstick about 1.5 oz.  One 5 oz can of tuna  contains 5 oz of protein.  1 medium egg contains 1 oz of protein.    Lowering your animal protein intake and eating more fruits and vegetables also benefits your overall health by limiting the amount of saturated fats and cholesterol in your diet. This helps to reduce your risk of cardiovascular disease.    Oxalate    While oxalate plays an important role in the development of calcium oxalate stones, dietary restriction does not appear to be effective in reducing the risk of stones in the majority of patients. About 40% of urinary oxalate comes from dietary sources while the remainder is naturally made within the liver. Therefore, reducing oxalate dietary intake does not always have a significant impact on total urinary oxalate levels.    Oxalate is found in many vegetable and fruits, including many healthy dietary choices often making it difficult to achieve a low oxalate diet. Because of these issues, oxalate avoidance is beneficial primarily in those individuals with specific abnormalities that lead to high oxalate urinary levels.    We recommend that most stone formers should maintain a normal oxalate intake without the need for oxalate restriction. High oxalate intake should be avoided in individuals found to have high urinary oxalate levels on metabolic evaluation. Oxalate restriction may be beneficial in certain individuals with high urinary oxalate levels.    Oxalate Rich Foods    Tea (black)  Spinach  Mustard Greens  Chard  Beets  Rhubarb  Okra  Berries  Chocolate  Nuts  Sweet Potatoes        Calcium    Kidney Stone formers often question whether to stop or reduce their calcium intake. Despite the fact that calcium is a major component of 75% of stones, excessive calcium intake is vary rarely the cause of stone formation. In fact, several studies have shown that restricting calcium intake in most stone formers actually increases the number of stones they develop. This appears to happen because when less  calcium is ingested, it becomes easier for oxalate (which normally binds with calcium in the gut) to be absorbed. Higher levels of oxalate in the urine then lead to an increase in stone risk. Calcium obtained from dietary sources appears to be better than calcium from supplements in regards to lowering stone risk because supplements can actually increase your risk of stones slightly (by 17%) while dietary calcium intake is instead associated with lower stone risk. If you need to take supplements, taking them during meals appear to be better in terms of stone risk.    We recommend that stone formers maintain a normal calcium intake, preferably from dietary sources. Female stone formers taking calcium supplementation to prevent osteoporosis experience a slightly increased risk of stones (17%) which needs to be balanced against their risk of osteoporosis.Things to do to reduce your blood pressure include working with all your physician to do the following:  ~ stop smoking if you smoke.  ~ increase cardiovascular exercise like walking and swimming.   ~ modify your diet to decrease fat and salt intake.  ~ reduce your weight if you are overweight or obese.  ~ increase the consumption of fruits, vegetables and whole grains.  ~ decrease alcohol consumption if you consume alcohol.   ~ try to minimize stress in your life with lifestyle modifications.   ~ be compliant with your anti-hypertensive medications.   ~ adjust your medications to help improve your vascular stiffness and decrease risks for heart attacks and strokes. Exercise to Lose Weight     Exercise and a healthy diet may help you lose weight. Your doctor may suggest specific exercises.     EXERCISE IDEAS AND TIPS     Choose low-cost things you enjoy doing, such as walking, bicycling, or exercising to workout videos.   Take stairs instead of the elevator.   Walk during your lunch break.   Park your car further away from work or school.   Go to a gym or an exercise  class.   Start with 5 to 10 minutes of exercise each day. Build up to 30 minutes of exercise 4 to 6 days a week.   Wear shoes with good support and comfortable clothes.   Stretch before and after working out.   Work out until you breathe harder and your heart beats faster.   Drink extra water when you exercise.   Do not do so much that you hurt yourself, feel dizzy, or get very short of breath.     Exercises that burn about 150 calories:   Running 1 ½ miles in 15 minutes.   Playing volleyball for 45 to 60 minutes.   Washing and waxing a car for 45 to 60 minutes.   Playing touch football for 45 minutes.   Walking 1 ¾ miles in 35 minutes.   Pushing a stroller 1 ½ miles in 30 minutes.   Playing basketball for 30 minutes.   Raking leaves for 30 minutes.   Bicycling 5 miles in 30 minutes.   Walking 2 miles in 30 minutes.   Dancing for 30 minutes.   Shoveling snow for 15 minutes.   Swimming laps for 20 minutes.   Walking up stairs for 15 minutes.   Bicycling 4 miles in 15 minutes.   Gardening for 30 to 45 minutes.   Jumping rope for 15 minutes.   Washing windows or floors for 45 to 60 minutes.

## 2025-02-06 NOTE — ASSESSMENT & PLAN NOTE
Recent vitamin D level 28.6 from December 2024  Currently taking over-the-counter supplementation with close to 4000 of vitamin D advised patient to be regular with that  Check vitamin D prior to next visit and in 6 months to see if dosage needs adjustments  Orders:  •  Renal function panel; Future  •  Vitamin D 25 hydroxy; Future  •  Renal function panel; Future  •  Vitamin D 25 hydroxy; Future  •  Phosphorus; Future  •  Albumin / creatinine urine ratio; Future  •  Stone risk profile; Future

## 2025-02-06 NOTE — ASSESSMENT & PLAN NOTE
BP Readings from Last 3 Encounters:   02/06/25 134/78   01/07/25 124/68   07/02/24 134/74     Current medications: Cardizem 180 mg p.o. daily, lisinopril HCTZ 20/25 mg p.o. daily  Changes made today: None blood pressures volume status stable  Goal BP of < 130/80 based on age and comorbidities  Instructed to follow low sodium (2gm)diet.  Advised to hold ACEI/ARBs if patient suffers from dehydration due to gastrointestinal losses due to risk of WAYNE secondary to failure to autoregulate.  Orders:  •  Renal function panel; Future  •  Vitamin D 25 hydroxy; Future  •  Renal function panel; Future  •  Vitamin D 25 hydroxy; Future  •  Phosphorus; Future  •  Albumin / creatinine urine ratio; Future  •  Stone risk profile; Future

## 2025-02-06 NOTE — ASSESSMENT & PLAN NOTE
Lab Results   Component Value Date    EGFR 93 12/30/2024    EGFR 92 06/14/2024    EGFR 81 01/05/2024    CREATININE 0.92 12/30/2024    CREATININE 0.93 06/14/2024    CREATININE 1.04 01/05/2024     after review of records In [facility] as well as Care everywhere patient has a baseline creatinine of 0.8-1.1 mg/dL dating as far back as 2018.   Most recent labs show a Creatinine of 0.92 mg/dL on 12/30/2024. Renal function remains stable.   Likely has underlying CKD due to obesity due to hyperfiltration plus hypertensive nephrosclerosis plus renal scarring from nephrolithiasis  Imaging:   CT abdomen 2/10/2023 right kidney multiple nonobstructing intrarenal calculi measuring up to 5 mm no hydronephrosis. Left kidney multiple nonobstructing midpole calculi measuring up to 8 mm no hydronephrosis.   Recommend to avoid use of NSAIDs, nephrotoxins. Caution advised with regards to exposure to IV contrast dye.   Discussed with the patient in depth his renal status, including the possible etiologies for CKD.   Advised the patient that when his GFR is close to 20mL/min then will start discussing about RRT(renal replacement therapy) options such as renal transplant, peritoneal dialysis and hemodialysis.   Informed the patient about the various options for Renal Replacement therapy.  Discussed with the patient how we need to work together to delay the progression of CKD with optimal BP control based on their age and co-morbidities, optimal BS control with HbA1c of <7% and trying to reduce proteinuria by the use of anti-proteinuric agents.   CKD education/Kidney smart: Not applicable at this time  Follow-up: Patient is to follow-up in 12 months, with lab work to be performed in 6 months and then again in a few days prior to the next visit. Advised patient to call me in 10 days to review the results if they do not hear back from me, as I may have not received the results.  Orders:  •  Renal function panel; Future  •  Vitamin D 25 hydroxy;  Future  •  Renal function panel; Future  •  Vitamin D 25 hydroxy; Future  •  Phosphorus; Future  •  Albumin / creatinine urine ratio; Future  •  Stone risk profile; Future

## 2025-02-06 NOTE — ASSESSMENT & PLAN NOTE
On calcium citrate 50 mg p.o. twice daily with meals  Check 25 urine prior to next visit  Orders:  •  Potassium Citrate ER 15 MEQ (1620 MG) TBCR; Take 1 tablet by mouth 2 (two) times a day with meals  •  Renal function panel; Future  •  Vitamin D 25 hydroxy; Future  •  Renal function panel; Future  •  Vitamin D 25 hydroxy; Future  •  Phosphorus; Future  •  Albumin / creatinine urine ratio; Future  •  Stone risk profile; Future

## 2025-02-06 NOTE — PROGRESS NOTES
Name: Valentin Diaz      : 1970      MRN: [id number]  Encounter Provider: Pita Bear MD  Encounter Date: 2025   Encounter department: [facility] [place]  :53-year-old male with multiple comorbidities morbid obesity, GERD, YELENA on CPAP, paroyxsmal afib, multinodular goiter, hypertension (>10yrs), gout and nephrolithiasis for routine follow-up.       Assessment & Plan  Nephrolithiasis  Discussed with the patient that the most common types of kidney stones are calcium oxalate stones.   Advised to follow a diet with increased fluid intake so that urine output is greater than 2-2.5L/day.  Advised patient to decrease salt, protein and oxalate intake.   Dietary handouts given.  Stone from 2300% calcium oxalate monohydrate composition  24-hour urine from 2023 hypocitraturia good urine volumes elevated urine phosphorus levels subsequently was started on potassium citrate 15 mill equivalents p.o. twice daily from 2023  24 urine from 2024 still with elevated urinary phosphorus levels also with elevated urine sodium, good urine volumes and hypocitraturia  Most recent 24 urine from 2025 good urine volumes elevated urine sodium levels as well as urinary phosphorus levels and uric acid levels.  Improved urinary citrate levels  Continue potassium citrate at 10 mill equivalents p.o. twice daily, HCTZ 25 mg p.o. daily  Encourage patient to follow low phosphorus diet and will increase allopurinol to 150 mg p.o. daily  Recheck 24-hour urine prior to next visit  Courage patient with regards to dietary habits  Check blood work for renal function panel and vitamin D in 6 months    Orders:  •  Renal function panel; Future  •  Vitamin D 25 hydroxy; Future  •  Renal function panel; Future  •  Vitamin D 25 hydroxy; Future  •  Phosphorus; Future  •  Albumin / creatinine urine ratio; Future  •  Stone risk profile; Future    CKD (chronic kidney disease) stage 2, GFR  60-89 ml/min  Lab Results   Component Value Date    EGFR 93 12/30/2024    EGFR 92 06/14/2024    EGFR 81 01/05/2024    CREATININE 0.92 12/30/2024    CREATININE 0.93 06/14/2024    CREATININE 1.04 01/05/2024     after review of records In [facility] as well as Care everywhere patient has a baseline creatinine of 0.8-1.1 mg/dL dating as far back as 2018.   Most recent labs show a Creatinine of 0.92 mg/dL on 12/30/2024. Renal function remains stable.   Likely has underlying CKD due to obesity due to hyperfiltration plus hypertensive nephrosclerosis plus renal scarring from nephrolithiasis  Imaging:   CT abdomen 2/10/2023 right kidney multiple nonobstructing intrarenal calculi measuring up to 5 mm no hydronephrosis. Left kidney multiple nonobstructing midpole calculi measuring up to 8 mm no hydronephrosis.   Recommend to avoid use of NSAIDs, nephrotoxins. Caution advised with regards to exposure to IV contrast dye.   Discussed with the patient in depth his renal status, including the possible etiologies for CKD.   Advised the patient that when his GFR is close to 20mL/min then will start discussing about RRT(renal replacement therapy) options such as renal transplant, peritoneal dialysis and hemodialysis.   Informed the patient about the various options for Renal Replacement therapy.  Discussed with the patient how we need to work together to delay the progression of CKD with optimal BP control based on their age and co-morbidities, optimal BS control with HbA1c of <7% and trying to reduce proteinuria by the use of anti-proteinuric agents.   CKD education/Kidney smart: Not applicable at this time  Follow-up: Patient is to follow-up in 12 months, with lab work to be performed in 6 months and then again in a few days prior to the next visit. Advised patient to call me in 10 days to review the results if they do not hear back from me, as I may have not received the results.  Orders:  •  Renal function panel; Future  •  Vitamin D  25 hydroxy; Future  •  Renal function panel; Future  •  Vitamin D 25 hydroxy; Future  •  Phosphorus; Future  •  Albumin / creatinine urine ratio; Future  •  Stone risk profile; Future    Essential hypertension  BP Readings from Last 3 Encounters:   02/06/25 134/78   01/07/25 124/68   07/02/24 134/74     Current medications: Cardizem 180 mg p.o. daily, lisinopril HCTZ 20/25 mg p.o. daily  Changes made today: None blood pressures volume status stable  Goal BP of < 130/80 based on age and comorbidities  Instructed to follow low sodium (2gm)diet.  Advised to hold ACEI/ARBs if patient suffers from dehydration due to gastrointestinal losses due to risk of WAYNE secondary to failure to autoregulate.  Orders:  •  Renal function panel; Future  •  Vitamin D 25 hydroxy; Future  •  Renal function panel; Future  •  Vitamin D 25 hydroxy; Future  •  Phosphorus; Future  •  Albumin / creatinine urine ratio; Future  •  Stone risk profile; Future    Hypocitraturia  On calcium citrate 50 mg p.o. twice daily with meals  Check 25 urine prior to next visit  Orders:  •  Potassium Citrate ER 15 MEQ (1620 MG) TBCR; Take 1 tablet by mouth 2 (two) times a day with meals  •  Renal function panel; Future  •  Vitamin D 25 hydroxy; Future  •  Renal function panel; Future  •  Vitamin D 25 hydroxy; Future  •  Phosphorus; Future  •  Albumin / creatinine urine ratio; Future  •  Stone risk profile; Future    Morbid obesity (HCC)  Encouraged patient to follow a renal diet comprising of moderate potassium, low phosphorus and protein restriction to 0.8gm/kg.  Dietary handouts provided  Encouraged weight loss.  Will check serum albumin with next blood work.   Orders:  •  Renal function panel; Future  •  Vitamin D 25 hydroxy; Future  •  Renal function panel; Future  •  Vitamin D 25 hydroxy; Future  •  Phosphorus; Future  •  Albumin / creatinine urine ratio; Future  •  Stone risk profile; Future    Vitamin D deficiency  Recent vitamin D level 28.6 from  "December 2024  Currently taking over-the-counter supplementation with close to 4000 of vitamin D advised patient to be regular with that  Check vitamin D prior to next visit and in 6 months to see if dosage needs adjustments  Orders:  •  Renal function panel; Future  •  Vitamin D 25 hydroxy; Future  •  Renal function panel; Future  •  Vitamin D 25 hydroxy; Future  •  Phosphorus; Future  •  Albumin / creatinine urine ratio; Future  •  Stone risk profile; Future    Hyperuricemia  On allopurinol 100 mg p.o. daily increased to 150 mg p.o. daily  Recheck 24-hour urine prior to next visit  Orders:  •  allopurinol (ZYLOPRIM) 100 mg tablet; Take 1.5 tablets (150 mg total) by mouth daily  •  Renal function panel; Future  •  Vitamin D 25 hydroxy; Future  •  Renal function panel; Future  •  Vitamin D 25 hydroxy; Future  •  Phosphorus; Future  •  Albumin / creatinine urine ratio; Future  •  Stone risk profile; Future        History of Present Illness   NUSRAT Diaz is a 54 y.o. male who presents to the office for routine follow-up feels well has no complaints no recent hospitalization no issues with edema thankful for the care information that is gone today no recent stone episodes agrees with current plan.      Review of Systems   Constitutional:  Negative for chills and fever.   HENT:  Negative for congestion.    Respiratory:  Negative for cough and shortness of breath.    Cardiovascular:  Negative for leg swelling.   Gastrointestinal:  Negative for constipation.   Genitourinary:  Negative for dysuria and hematuria.   Musculoskeletal:  Negative for back pain.   Neurological:  Negative for dizziness and headaches.   Psychiatric/Behavioral:  Negative for agitation and confusion.    All other systems reviewed and are negative.         Objective   /78 (BP Location: Left arm, Patient Position: Sitting, Cuff Size: Large)   Ht 5' 10\" (1.778 m)   Wt 136 kg (299 lb)   BMI 42.90 kg/m²      Physical Exam  Vitals " reviewed.   Constitutional:       General: He is not in acute distress.     Appearance: Normal appearance. He is obese. He is not ill-appearing, toxic-appearing or diaphoretic.   HENT:      Head: Normocephalic and atraumatic.      Mouth/Throat:      Mouth: Mucous membranes are moist.      Pharynx: No oropharyngeal exudate.   Eyes:      General: No scleral icterus.  Cardiovascular:      Rate and Rhythm: Normal rate.   Pulmonary:      Effort: No respiratory distress.      Breath sounds: Normal breath sounds. No stridor. No wheezing.   Abdominal:      General: There is no distension.      Palpations: There is no mass.      Tenderness: There is no right CVA tenderness or left CVA tenderness.   Musculoskeletal:         General: No swelling.      Cervical back: Normal range of motion. No rigidity.   Skin:     Coloration: Skin is not jaundiced.   Neurological:      General: No focal deficit present.      Mental Status: He is alert and oriented to person, place, and time.   Psychiatric:         Mood and Affect: Mood normal.         Behavior: Behavior normal.

## 2025-02-06 NOTE — ASSESSMENT & PLAN NOTE
Encouraged patient to follow a renal diet comprising of moderate potassium, low phosphorus and protein restriction to 0.8gm/kg.  Dietary handouts provided  Encouraged weight loss.  Will check serum albumin with next blood work.   Orders:  •  Renal function panel; Future  •  Vitamin D 25 hydroxy; Future  •  Renal function panel; Future  •  Vitamin D 25 hydroxy; Future  •  Phosphorus; Future  •  Albumin / creatinine urine ratio; Future  •  Stone risk profile; Future

## 2025-02-23 ENCOUNTER — APPOINTMENT (OUTPATIENT)
Dept: LAB | Age: 55
End: 2025-02-23
Payer: COMMERCIAL

## 2025-02-23 LAB
ALBUMIN SERPL BCG-MCNC: 4.1 G/DL (ref 3.5–5)
ALP SERPL-CCNC: 80 U/L (ref 34–104)
ALT SERPL W P-5'-P-CCNC: 65 U/L (ref 7–52)
AST SERPL W P-5'-P-CCNC: 33 U/L (ref 13–39)
BILIRUB DIRECT SERPL-MCNC: 0.13 MG/DL (ref 0–0.2)
BILIRUB SERPL-MCNC: 0.48 MG/DL (ref 0.2–1)
PROT SERPL-MCNC: 7 G/DL (ref 6.4–8.4)

## 2025-02-23 PROCEDURE — 80076 HEPATIC FUNCTION PANEL: CPT

## 2025-04-08 ENCOUNTER — APPOINTMENT (OUTPATIENT)
Dept: RADIOLOGY | Age: 55
End: 2025-04-08
Payer: COMMERCIAL

## 2025-04-08 ENCOUNTER — HOSPITAL ENCOUNTER (OUTPATIENT)
Dept: RADIOLOGY | Age: 55
Discharge: HOME | End: 2025-04-08
Payer: COMMERCIAL

## 2025-04-08 DIAGNOSIS — N20.0 BILATERAL NEPHROLITHIASIS: ICD-10-CM

## 2025-04-08 PROCEDURE — 76775 US EXAM ABDO BACK WALL LIM: CPT

## 2025-04-08 PROCEDURE — 74018 RADEX ABDOMEN 1 VIEW: CPT

## 2025-04-15 ENCOUNTER — OFFICE VISIT (OUTPATIENT)
Dept: UROLOGY | Facility: AMBULATORY SURGERY CENTER | Age: 55
End: 2025-04-15
Payer: COMMERCIAL

## 2025-04-15 VITALS
DIASTOLIC BLOOD PRESSURE: 60 MMHG | SYSTOLIC BLOOD PRESSURE: 134 MMHG | HEART RATE: 90 BPM | OXYGEN SATURATION: 93 % | BODY MASS INDEX: 42.23 KG/M2 | WEIGHT: 295 LBS | HEIGHT: 70 IN

## 2025-04-15 DIAGNOSIS — N20.0 BILATERAL NEPHROLITHIASIS: ICD-10-CM

## 2025-04-15 DIAGNOSIS — R39.9 UTI SYMPTOMS: Primary | ICD-10-CM

## 2025-04-15 LAB
SL AMB  POCT GLUCOSE, UA: NORMAL
SL AMB LEUKOCYTE ESTERASE,UA: NORMAL
SL AMB POCT BILIRUBIN,UA: NORMAL
SL AMB POCT BLOOD,UA: NORMAL
SL AMB POCT CLARITY,UA: CLEAR
SL AMB POCT COLOR,UA: NORMAL
SL AMB POCT KETONES,UA: NORMAL
SL AMB POCT NITRITE,UA: NORMAL
SL AMB POCT PH,UA: 5
SL AMB POCT SPECIFIC GRAVITY,UA: 1.02
SL AMB POCT URINE PROTEIN: NORMAL
SL AMB POCT UROBILINOGEN: 0.2

## 2025-04-15 PROCEDURE — 99213 OFFICE O/P EST LOW 20 MIN: CPT

## 2025-04-15 PROCEDURE — 81002 URINALYSIS NONAUTO W/O SCOPE: CPT

## 2025-04-15 NOTE — PROGRESS NOTES
4/15/2025      Assessment and Plan    54 y.o. male managed by our office    Bilateral nephrolithiasis  Patient continues to follow with nephrology for kidney stone prevention.  Patient currently controlled on potassium citrate 15 mEq for stone prevention.  Ultrasound of the kidney and bladder performed 4/8/2025 noted bilateral nonobstructing renal calculi measuring 9 mm on each side.  We reviewed the patient's most recent ultrasound in the office today and discussed options for management of the nonobstructing stones.  We discussed continued surveillance versus ESWL or uteroscopy laser lithotripsy.  After discussion, the patient would be willing to continue to monitor the nonobstructing stones  Patient will undergo an ultrasound of the kidney and bladder and a KUB in 1 year to continue to monitor for worsening stone burden.        History of Present Illness  Valentin Diaz is a 54 y.o. male here for evaluation of history of nephrolithiasis and prostate cancer screening.  Patient was last seen in our office on 4/9/24.  CT renal stone study performed 2/10/2023 noted bilateral nonobstructing nephrolithiasis with the largest on the right measuring 5 mm and the largest on the left measuring 8 mm.  Patient did undergo ESWL in April 2023, but follow-up KUB did not demonstrate significant change in degree of stone burden.  Ultrasound of the kidney and bladder performed 4/8/2025 noted bilateral nonobstructing renal calculi measuring 9 mm.  Patient underwent stone risk profile on 1/16/2025 which noted high oxalate and high sodium.  Patient's last PSA is from 6/14/2024 and found to be stable at a value of 1.366.  Patient is controlled on potassium citrate 15 mEq for kidney stone prevention.    Today, the patient reports that he does intermittently experience right sided flank pain.  However, he notes that he believes this is more musculoskeletal in nature than from one of his nonobstructing kidney stones.  Otherwise, the  patient notes that he does urinate frequently when drinking coffee in the morning, but otherwise offers no other lower urinary tract complaints.  Patient is currently asymptomatic at today's office visit and denies dysuria and hematuria.      Review of Systems   Constitutional:  Negative for chills and fever.   HENT:  Negative for ear pain and sore throat.    Eyes:  Negative for pain and visual disturbance.   Respiratory:  Negative for cough and shortness of breath.    Cardiovascular:  Negative for chest pain and palpitations.   Gastrointestinal:  Negative for abdominal pain and vomiting.   Genitourinary:  Positive for flank pain. Negative for decreased urine volume, difficulty urinating, dysuria, frequency, hematuria and urgency.   Musculoskeletal:  Negative for arthralgias and back pain.   Skin:  Negative for color change and rash.   Neurological:  Negative for seizures and syncope.   All other systems reviewed and are negative.          AUA SYMPTOM SCORE      Flowsheet Row Most Recent Value   AUA SYMPTOM SCORE    How often have you had a sensation of not emptying your bladder completely after you finished urinating? 0 (P)     How often have you had to urinate again less than two hours after you finished urinating? 3 (P)     How often have you found you stopped and started again several times when you urinate? 0 (P)     How often have you found it difficult to postpone urination? 0 (P)     How often have you had a weak urinary stream? 0 (P)     How often have you had to push or strain to begin urination? 0 (P)     How many times did you most typically get up to urinate from the time you went to bed at night until the time you got up in the morning? 5 (P)     Quality of Life: If you were to spend the rest of your life with your urinary condition just the way it is now, how would you feel about that? 2 (P)     AUA SYMPTOM SCORE 8 (P)               Vitals  Vitals:    04/15/25 0736   BP: 134/60   BP Location: Left arm  "  Patient Position: Sitting   Cuff Size: Standard   Pulse: 90   SpO2: 93%   Weight: 134 kg (295 lb)   Height: 5' 10\" (1.778 m)       Physical Exam  Vitals reviewed.   Constitutional:       General: He is not in acute distress.     Appearance: Normal appearance. He is not ill-appearing.   HENT:      Head: Normocephalic and atraumatic.      Nose: Nose normal.   Eyes:      General: No scleral icterus.  Pulmonary:      Effort: No respiratory distress.   Abdominal:      General: Abdomen is flat. There is no distension.      Palpations: Abdomen is soft.      Tenderness: There is no abdominal tenderness.   Musculoskeletal:         General: Normal range of motion.      Cervical back: Normal range of motion.   Skin:     General: Skin is warm.      Coloration: Skin is not jaundiced.   Neurological:      Mental Status: He is alert and oriented to person, place, and time.      Gait: Gait normal.   Psychiatric:         Mood and Affect: Mood normal.         Behavior: Behavior normal.           Past History  Past Medical History:   Diagnosis Date    Bilateral nephrolithiasis 10/01/2013    CPAP (continuous positive airway pressure) dependence     GERD (gastroesophageal reflux disease)     Herpes zoster without complication 06/04/2019    Hypertension     Irregular heart beat     SVT    Kidney stone     Multinodular goiter 12/12/2018    Seasonal allergies     Sleep apnea      Social History     Socioeconomic History    Marital status:      Spouse name: None    Number of children: None    Years of education: None    Highest education level: None   Occupational History    None   Tobacco Use    Smoking status: Never    Smokeless tobacco: Never   Vaping Use    Vaping status: Never Used   Substance and Sexual Activity    Alcohol use: Yes     Alcohol/week: 1.0 standard drink of alcohol     Types: 1 Cans of beer per week     Comment: Occasionally    Drug use: No    Sexual activity: Not Currently   Other Topics Concern    None "   Social History Narrative    None     Social Drivers of Health     Financial Resource Strain: Not on file   Food Insecurity: Not on file   Transportation Needs: Not on file   Physical Activity: Not on file   Stress: Not on file   Social Connections: Not on file   Intimate Partner Violence: Not on file   Housing Stability: Not on file     Social History     Tobacco Use   Smoking Status Never   Smokeless Tobacco Never     Family History   Problem Relation Age of Onset    Hyperlipidemia Mother     Heart block Mother     Stroke Mother     Hypertension Mother     Kidney nephrosis Father     Nephrolithiasis Father     Nephrolithiasis Brother     Stroke Maternal Grandmother     No Known Problems Maternal Grandfather     Cancer Paternal Grandmother     Heart disease Paternal Grandfather        The following portions of the patient's history were reviewed and updated as appropriate: allergies, current medications, past medical history, past social history, past surgical history and problem list.    Results  Recent Results (from the past hour)   POCT urine dip    Collection Time: 04/15/25  7:43 AM   Result Value Ref Range    LEUKOCYTE ESTERASE,UA -     NITRITE,UA -     SL AMB POCT UROBILINOGEN 0.2     POCT URINE PROTEIN -      PH,UA 5.0     BLOOD,UA -     SPECIFIC GRAVITY,UA 1.025     KETONES,UA -     BILIRUBIN,UA -     GLUCOSE, UA -      COLOR,UA dark yellow     CLARITY,UA clear    ]  Lab Results   Component Value Date    PSA 1.366 06/14/2024    PSA 1.32 06/06/2023     Lab Results   Component Value Date    CALCIUM 9.8 12/30/2024    K 4.3 12/30/2024    CO2 30 12/30/2024     12/30/2024    BUN 20 12/30/2024    CREATININE 0.92 12/30/2024     Lab Results   Component Value Date    WBC 6.63 06/14/2024    HGB 15.3 06/14/2024    HCT 45.0 06/14/2024    MCV 90 06/14/2024     06/14/2024

## 2025-04-15 NOTE — ASSESSMENT & PLAN NOTE
Patient continues to follow with nephrology for kidney stone prevention.  Patient currently controlled on potassium citrate 15 mEq for stone prevention.  Ultrasound of the kidney and bladder performed 4/8/2025 noted bilateral nonobstructing renal calculi measuring 9 mm on each side.  We reviewed the patient's most recent ultrasound in the office today and discussed options for management of the nonobstructing stones.  We discussed continued surveillance versus ESWL or uteroscopy laser lithotripsy.  After discussion, the patient would be willing to continue to monitor the nonobstructing stones  Patient will undergo an ultrasound of the kidney and bladder and a KUB in 1 year to continue to monitor for worsening stone burden.

## 2025-04-15 NOTE — PATIENT INSTRUCTIONS
Patient Education     Bladder Irritants   About this topic   Some foods and drinks may irritate your bladder. These may make your interstitial cystitis or incontinence worse. They may also make other bladder problems worse.  There are a lot of things on this list. However, it does not mean that you may never have these foods or drinks. Limiting them to small amounts may help make your bladder problems better. Start by taking away 1 or 2 things on the list and see if it helps. If needed, limit or take away other things to help you feel better.  General   These things may irritate your bladder:  Beer, wine, and mixed drinks (alcohol)  Chocolate  Food additives like MSG  Fruits, like:  Cranberries  [place]  Oranges  Grapefruit  Pineapple  Strawberries  Fruit juices, like:  Cranberry  Grapefruit  Lemon  Pineapple  Orange  Drinks with or without caffeine, like:  Coffee  Teas, including herbal teas  Dark sodas  Carbonated drinks  Legumes, like soybeans and other soy products, such as soy sauce and tofu  Mayonnaise  Meats that are processed, like:  Hot dogs  Bologna  Ham  Salami  Milk products, like yogurt  Spicy foods, such as Cajun, Mexican, [place], [place]uan, and Southwest foods  Sweeteners and artificial sweeteners, including:  Sugar  Honey  Fructose  Lactose  Sucrose  [place] sweeteners  Aspartame  NutraSweet  Tomatoes, tomato products and sauces, such as pasta or pizza sauces  Tobacco  Vinegar  Salad dressings made with lemon and vinegar  Condiments made with lemon and vinegar, such as mustard and ketchup  Over-the-counter drugs that have caffeine or vitamin C, such as multivitamins  These things probably do not irritate your bladder:  Fruit, like:  Apricots  Bananas  Blueberries  Dates  Melon  Prunes  Pears  Raisins  Vegetables, like:  Avocados  Asparagus  Beets  [place] sprouts  Cabbage  Carrots  Cauliflower  Celery  Cucumbers  Eggplant  Mushrooms  Peas  Potatoes  Radishes  Spinach  Squash  Turnips  Zucchini  Milk and  cheese  Beef, fish, pork, poultry, lamb  Peanut butter  Nuts  Eggs  Helpful tips   Drink 6 to 8 glasses of water each day.  Keep a food diary to track your food and symptoms. That way you can see which food makes your bladder problems worse.  Last Reviewed Date   2021-03-18  Consumer Information Use and Disclaimer   This generalized information is a limited summary of diagnosis, treatment, and/or medication information. It is not meant to be comprehensive and should be used as a tool to help the user understand and/or assess potential diagnostic and treatment options. It does NOT include all information about conditions, treatments, medications, side effects, or risks that may apply to a specific patient. It is not intended to be medical advice or a substitute for the medical advice, diagnosis, or treatment of a health care provider based on the health care provider's examination and assessment of a patient’s specific and unique circumstances. Patients must speak with a health care provider for complete information about their health, medical questions, and treatment options, including any risks or benefits regarding use of medications. This information does not endorse any treatments or medications as safe, effective, or approved for treating a specific patient. [employer] and its affiliates disclaim any warranty or liability relating to this information or the use thereof. The use of this information is governed by the Terms of Use, available at https://www.woltersBillogramuwer.com/en/know/clinical-effectiveness-terms   Copyright   Copyright © 2024 [employer] and its affiliates and/or licensors. All rights reserved.

## 2025-07-07 ENCOUNTER — APPOINTMENT (OUTPATIENT)
Dept: LAB | Age: 55
End: 2025-07-07
Attending: PREVENTIVE MEDICINE
Payer: COMMERCIAL

## 2025-07-07 ENCOUNTER — APPOINTMENT (OUTPATIENT)
Dept: LAB | Age: 55
End: 2025-07-07
Attending: INTERNAL MEDICINE
Payer: COMMERCIAL

## 2025-07-07 ENCOUNTER — RESULTS FOLLOW-UP (OUTPATIENT)
Dept: OTHER | Facility: HOSPITAL | Age: 55
End: 2025-07-07

## 2025-07-07 DIAGNOSIS — N18.2 CKD (CHRONIC KIDNEY DISEASE) STAGE 2, GFR 60-89 ML/MIN: ICD-10-CM

## 2025-07-07 DIAGNOSIS — E66.01 MORBID OBESITY (HCC): ICD-10-CM

## 2025-07-07 DIAGNOSIS — I47.10 PAROXYSMAL SVT (SUPRAVENTRICULAR TACHYCARDIA) (HCC): ICD-10-CM

## 2025-07-07 DIAGNOSIS — Z12.5 PROSTATE CANCER SCREENING: ICD-10-CM

## 2025-07-07 DIAGNOSIS — N20.0 NEPHROLITHIASIS: ICD-10-CM

## 2025-07-07 DIAGNOSIS — B35.1 ONYCHOMYCOSIS: ICD-10-CM

## 2025-07-07 DIAGNOSIS — R82.991 HYPOCITRATURIA: ICD-10-CM

## 2025-07-07 DIAGNOSIS — Z00.8 HEALTH EXAMINATION IN POPULATION SURVEY: ICD-10-CM

## 2025-07-07 DIAGNOSIS — E79.0 HYPERURICEMIA: ICD-10-CM

## 2025-07-07 DIAGNOSIS — Z12.11 SCREENING FOR COLON CANCER: ICD-10-CM

## 2025-07-07 DIAGNOSIS — E55.9 VITAMIN D DEFICIENCY: ICD-10-CM

## 2025-07-07 DIAGNOSIS — I10 ESSENTIAL HYPERTENSION: ICD-10-CM

## 2025-07-07 LAB
25(OH)D3 SERPL-MCNC: 35.6 NG/ML (ref 30–100)
ALBUMIN SERPL BCG-MCNC: 4.1 G/DL (ref 3.5–5)
ALP SERPL-CCNC: 81 U/L (ref 34–104)
ALT SERPL W P-5'-P-CCNC: 83 U/L (ref 7–52)
ANION GAP SERPL CALCULATED.3IONS-SCNC: 10 MMOL/L (ref 4–13)
AST SERPL W P-5'-P-CCNC: 42 U/L (ref 13–39)
BILIRUB SERPL-MCNC: 0.72 MG/DL (ref 0.2–1)
BUN SERPL-MCNC: 17 MG/DL (ref 5–25)
CALCIUM SERPL-MCNC: 9.2 MG/DL (ref 8.4–10.2)
CHLORIDE SERPL-SCNC: 101 MMOL/L (ref 96–108)
CHOLEST SERPL-MCNC: 148 MG/DL (ref ?–200)
CO2 SERPL-SCNC: 28 MMOL/L (ref 21–32)
CREAT SERPL-MCNC: 0.86 MG/DL (ref 0.6–1.3)
ERYTHROCYTE [DISTWIDTH] IN BLOOD BY AUTOMATED COUNT: 13.4 % (ref 11.6–15.1)
EST. AVERAGE GLUCOSE BLD GHB EST-MCNC: 126 MG/DL
GFR SERPL CREATININE-BSD FRML MDRD: 96 ML/MIN/1.73SQ M
GLUCOSE P FAST SERPL-MCNC: 122 MG/DL (ref 65–99)
HBA1C MFR BLD: 6 %
HCT VFR BLD AUTO: 43.3 % (ref 36.5–49.3)
HDLC SERPL-MCNC: 41 MG/DL
HEMOCCULT STL QL IA: NEGATIVE
HGB BLD-MCNC: 15 G/DL (ref 12–17)
LDLC SERPL CALC-MCNC: 93 MG/DL (ref 0–100)
MCH RBC QN AUTO: 30.2 PG (ref 26.8–34.3)
MCHC RBC AUTO-ENTMCNC: 34.6 G/DL (ref 31.4–37.4)
MCV RBC AUTO: 87 FL (ref 82–98)
NONHDLC SERPL-MCNC: 107 MG/DL
PHOSPHATE SERPL-MCNC: 3.2 MG/DL (ref 2.7–4.5)
PLATELET # BLD AUTO: 271 THOUSANDS/UL (ref 149–390)
PMV BLD AUTO: 9.8 FL (ref 8.9–12.7)
POTASSIUM SERPL-SCNC: 3.7 MMOL/L (ref 3.5–5.3)
PROT SERPL-MCNC: 6.6 G/DL (ref 6.4–8.4)
PSA SERPL-MCNC: 1.16 NG/ML (ref 0–4)
RBC # BLD AUTO: 4.97 MILLION/UL (ref 3.88–5.62)
SODIUM SERPL-SCNC: 139 MMOL/L (ref 135–147)
TRIGL SERPL-MCNC: 68 MG/DL (ref ?–150)
TSH SERPL DL<=0.05 MIU/L-ACNC: 1.61 UIU/ML (ref 0.45–4.5)
WBC # BLD AUTO: 7.81 THOUSAND/UL (ref 4.31–10.16)

## 2025-07-07 PROCEDURE — 80061 LIPID PANEL: CPT

## 2025-07-07 PROCEDURE — 36415 COLL VENOUS BLD VENIPUNCTURE: CPT

## 2025-07-07 PROCEDURE — G0328 FECAL BLOOD SCRN IMMUNOASSAY: HCPCS

## 2025-07-07 PROCEDURE — 82306 VITAMIN D 25 HYDROXY: CPT

## 2025-07-07 PROCEDURE — 83036 HEMOGLOBIN GLYCOSYLATED A1C: CPT

## 2025-07-07 PROCEDURE — 84100 ASSAY OF PHOSPHORUS: CPT

## 2025-07-07 PROCEDURE — 80053 COMPREHEN METABOLIC PANEL: CPT

## 2025-07-07 PROCEDURE — G0103 PSA SCREENING: HCPCS

## 2025-07-07 PROCEDURE — 84443 ASSAY THYROID STIM HORMONE: CPT

## 2025-08-20 ENCOUNTER — OFFICE VISIT (OUTPATIENT)
Dept: INTERNAL MEDICINE CLINIC | Facility: OTHER | Age: 55
End: 2025-08-20
Payer: COMMERCIAL

## 2025-08-20 VITALS
WEIGHT: 300.6 LBS | TEMPERATURE: 98.5 F | SYSTOLIC BLOOD PRESSURE: 130 MMHG | HEIGHT: 70 IN | OXYGEN SATURATION: 95 % | DIASTOLIC BLOOD PRESSURE: 72 MMHG | RESPIRATION RATE: 18 BRPM | HEART RATE: 76 BPM | BODY MASS INDEX: 43.03 KG/M2

## 2025-08-20 DIAGNOSIS — J30.1 SEASONAL ALLERGIC RHINITIS DUE TO POLLEN: ICD-10-CM

## 2025-08-20 DIAGNOSIS — R73.01 IMPAIRED FASTING BLOOD SUGAR: ICD-10-CM

## 2025-08-20 DIAGNOSIS — I47.10 PAROXYSMAL SVT (SUPRAVENTRICULAR TACHYCARDIA) (HCC): ICD-10-CM

## 2025-08-20 DIAGNOSIS — N18.2 CKD (CHRONIC KIDNEY DISEASE) STAGE 2, GFR 60-89 ML/MIN: ICD-10-CM

## 2025-08-20 DIAGNOSIS — K76.0 NAFL (NONALCOHOLIC FATTY LIVER): ICD-10-CM

## 2025-08-20 DIAGNOSIS — I10 ESSENTIAL HYPERTENSION: Primary | ICD-10-CM

## 2025-08-20 DIAGNOSIS — K21.9 LARYNGOPHARYNGEAL REFLUX (LPR): ICD-10-CM

## 2025-08-20 DIAGNOSIS — E04.2 MULTINODULAR GOITER: ICD-10-CM

## 2025-08-20 DIAGNOSIS — G47.33 OBSTRUCTIVE SLEEP APNEA: ICD-10-CM

## 2025-08-20 PROCEDURE — 99214 OFFICE O/P EST MOD 30 MIN: CPT | Performed by: INTERNAL MEDICINE

## 2025-08-20 RX ORDER — AZELASTINE 1 MG/ML
1 SPRAY, METERED NASAL 2 TIMES DAILY PRN
Qty: 30 ML | Refills: 3 | Status: SHIPPED | OUTPATIENT
Start: 2025-08-20

## (undated) DEVICE — SCD SEQUENTIAL COMPRESSION COMFORT SLEEVE MEDIUM KNEE LENGTH: Brand: KENDALL SCD